# Patient Record
Sex: FEMALE | Race: BLACK OR AFRICAN AMERICAN | Employment: OTHER | URBAN - METROPOLITAN AREA
[De-identification: names, ages, dates, MRNs, and addresses within clinical notes are randomized per-mention and may not be internally consistent; named-entity substitution may affect disease eponyms.]

---

## 2017-04-17 RX ORDER — VERAPAMIL HYDROCHLORIDE 240 MG/1
TABLET, FILM COATED, EXTENDED RELEASE ORAL
Qty: 90 TAB | Refills: 2 | Status: SHIPPED | OUTPATIENT
Start: 2017-04-17 | End: 2017-11-29 | Stop reason: SDUPTHER

## 2017-04-17 RX ORDER — PRAVASTATIN SODIUM 40 MG/1
TABLET ORAL
Qty: 90 TAB | Refills: 2 | Status: SHIPPED | OUTPATIENT
Start: 2017-04-17 | End: 2017-11-29 | Stop reason: SDUPTHER

## 2017-06-08 ENCOUNTER — OFFICE VISIT (OUTPATIENT)
Dept: INTERNAL MEDICINE CLINIC | Age: 82
End: 2017-06-08

## 2017-06-08 VITALS
OXYGEN SATURATION: 97 % | BODY MASS INDEX: 32.47 KG/M2 | DIASTOLIC BLOOD PRESSURE: 80 MMHG | HEIGHT: 66 IN | HEART RATE: 85 BPM | WEIGHT: 202 LBS | SYSTOLIC BLOOD PRESSURE: 164 MMHG | TEMPERATURE: 98 F | RESPIRATION RATE: 16 BRPM

## 2017-06-08 DIAGNOSIS — E11.9 CONTROLLED TYPE 2 DIABETES MELLITUS WITHOUT COMPLICATION, WITHOUT LONG-TERM CURRENT USE OF INSULIN (HCC): Chronic | ICD-10-CM

## 2017-06-08 DIAGNOSIS — I10 ESSENTIAL HYPERTENSION, BENIGN: Chronic | ICD-10-CM

## 2017-06-08 DIAGNOSIS — R39.81 FUNCTIONAL URINARY INCONTINENCE: ICD-10-CM

## 2017-06-08 DIAGNOSIS — F03.90 DEMENTIA WITHOUT BEHAVIORAL DISTURBANCE, UNSPECIFIED DEMENTIA TYPE: Primary | ICD-10-CM

## 2017-06-08 DIAGNOSIS — E78.2 MIXED HYPERLIPIDEMIA: Chronic | ICD-10-CM

## 2017-06-08 LAB
BILIRUB UR QL STRIP: NEGATIVE
GLUCOSE UR-MCNC: NEGATIVE MG/DL
KETONES P FAST UR STRIP-MCNC: NEGATIVE MG/DL
PH UR STRIP: 6 [PH] (ref 4.6–8)
PROT UR QL STRIP: NEGATIVE MG/DL
SP GR UR STRIP: 1.02 (ref 1–1.03)
UA UROBILINOGEN AMB POC: NORMAL (ref 0.2–1)
URINALYSIS CLARITY POC: CLEAR
URINALYSIS COLOR POC: NORMAL
URINE BLOOD POC: NORMAL
URINE LEUKOCYTES POC: NEGATIVE
URINE NITRITES POC: NEGATIVE

## 2017-06-08 RX ORDER — OXYBUTYNIN CHLORIDE 5 MG/1
TABLET, EXTENDED RELEASE ORAL
Qty: 30 TAB | Refills: 5 | Status: SHIPPED | OUTPATIENT
Start: 2017-06-08 | End: 2017-09-08

## 2017-06-08 RX ORDER — MEMANTINE HYDROCHLORIDE 28 MG/1
28 CAPSULE, EXTENDED RELEASE ORAL DAILY
Qty: 30 CAP | Refills: 5 | Status: SHIPPED | OUTPATIENT
Start: 2017-06-08 | End: 2017-09-08 | Stop reason: SINTOL

## 2017-06-08 NOTE — PATIENT INSTRUCTIONS
Low Sodium Diet (2,000 Milligram): Care Instructions  Your Care Instructions  Too much sodium causes your body to hold on to extra water. This can raise your blood pressure and force your heart and kidneys to work harder. In very serious cases, this could cause you to be put in the hospital. It might even be life-threatening. By limiting sodium, you will feel better and lower your risk of serious problems. The most common source of sodium is salt. People get most of the salt in their diet from canned, prepared, and packaged foods. Fast food and restaurant meals also are very high in sodium. Your doctor will probably limit your sodium to less than 2,000 milligrams (mg) a day. This limit counts all the sodium in prepared and packaged foods and any salt you add to your food. Follow-up care is a key part of your treatment and safety. Be sure to make and go to all appointments, and call your doctor if you are having problems. It's also a good idea to know your test results and keep a list of the medicines you take. How can you care for yourself at home? Read food labels  · Read labels on cans and food packages. The labels tell you how much sodium is in each serving. Make sure that you look at the serving size. If you eat more than the serving size, you have eaten more sodium. · Food labels also tell you the Percent Daily Value for sodium. Choose products with low Percent Daily Values for sodium. · Be aware that sodium can come in forms other than salt, including monosodium glutamate (MSG), sodium citrate, and sodium bicarbonate (baking soda). MSG is often added to Asian food. When you eat out, you can sometimes ask for food without MSG or added salt. Buy low-sodium foods  · Buy foods that are labeled \"unsalted\" (no salt added), \"sodium-free\" (less than 5 mg of sodium per serving), or \"low-sodium\" (less than 140 mg of sodium per serving).  Foods labeled \"reduced-sodium\" and \"light sodium\" may still have too much sodium. Be sure to read the label to see how much sodium you are getting. · Buy fresh vegetables, or frozen vegetables without added sauces. Buy low-sodium versions of canned vegetables, soups, and other canned goods. Prepare low-sodium meals  · Cut back on the amount of salt you use in cooking. This will help you adjust to the taste. Do not add salt after cooking. One teaspoon of salt has about 2,300 mg of sodium. · Take the salt shaker off the table. · Flavor your food with garlic, lemon juice, onion, vinegar, herbs, and spices. Do not use soy sauce, lite soy sauce, steak sauce, onion salt, garlic salt, celery salt, mustard, or ketchup on your food. · Use low-sodium salad dressings, sauces, and ketchup. Or make your own salad dressings and sauces without adding salt. · Use less salt (or none) when recipes call for it. You can often use half the salt a recipe calls for without losing flavor. Other foods such as rice, pasta, and grains do not need added salt. · Rinse canned vegetables, and cook them in fresh water. This removes some--but not all--of the salt. · Avoid water that is naturally high in sodium or that has been treated with water softeners, which add sodium. Call your local water company to find out the sodium content of your water supply. If you buy bottled water, read the label and choose a sodium-free brand. Avoid high-sodium foods  · Avoid eating:  ¨ Smoked, cured, salted, and canned meat, fish, and poultry. ¨ Ham, escamilla, hot dogs, and luncheon meats. ¨ Regular, hard, and processed cheese and regular peanut butter. ¨ Crackers with salted tops, and other salted snack foods such as pretzels, chips, and salted popcorn. ¨ Frozen prepared meals, unless labeled low-sodium. ¨ Canned and dried soups, broths, and bouillon, unless labeled sodium-free or low-sodium. ¨ Canned vegetables, unless labeled sodium-free or low-sodium. ¨ Western Ingrid fries, pizza, tacos, and other fast foods.   Vernel Real, olives, ketchup, and other condiments, especially soy sauce, unless labeled sodium-free or low-sodium. Where can you learn more? Go to http://annelise-mirna.info/. Enter A239 in the search box to learn more about \"Low Sodium Diet (2,000 Milligram): Care Instructions. \"  Current as of: July 26, 2016  Content Version: 11.2  © 2160-1986 [employer]. Care instructions adapted under license by [employer] (which disclaims liability or warranty for this information). If you have questions about a medical condition or this instruction, always ask your healthcare professional. [place] [address] any warranty or liability for your use of this information. Leg and Ankle Edema: Care Instructions  Your Care Instructions  Swelling in the legs, ankles, and feet is called edema. It is common after you sit or stand for a while. Long plane flights or car rides often cause swelling in the legs and feet. You may also have swelling if you have to stand for long periods of time at your job. Problems with the veins in the legs (varicose veins) and changes in hormones can also cause swelling. Sometimes the swelling in the ankles and feet is caused by a more serious problem, such as heart failure, infection, blood clots, or liver or kidney disease. Follow-up care is a key part of your treatment and safety. Be sure to make and go to all appointments, and call your doctor if you are having problems. Its also a good idea to know your test results and keep a list of the medicines you take. How can you care for yourself at home? · If your doctor gave you medicine, take it as prescribed. Call your doctor if you think you are having a problem with your medicine. · Whenever you are resting, raise your legs up. Try to keep the swollen area higher than the level of your heart. · Take breaks from standing or sitting in one position.   ¨ Walk around to increase the blood flow in your lower legs. ¨ Move your feet and ankles often while you stand, or tighten and relax your leg muscles. · Wear support stockings. Put them on in the morning, before swelling gets worse. · Eat a balanced diet. Lose weight if you need to. · Limit the amount of salt (sodium) in your diet. Salt holds fluid in the body and may increase swelling. When should you call for help? Call 911 anytime you think you may need emergency care. For example, call if:  · You have symptoms of a blood clot in your lung (called a pulmonary embolism). These may include:  ¨ Sudden chest pain. ¨ Trouble breathing. ¨ Coughing up blood. Call your doctor now or seek immediate medical care if:  · You have signs of a blood clot, such as:  ¨ Pain in your calf, back of the knee, thigh, or groin. ¨ Redness and swelling in your leg or groin. · You have symptoms of infection, such as:  ¨ Increased pain, swelling, warmth, or redness. ¨ Red streaks or pus. ¨ A fever. Watch closely for changes in your health, and be sure to contact your doctor if:  · Your swelling is getting worse. · You have new or worsening pain in your legs. · You do not get better as expected. Where can you learn more? Go to http://annelise-mirna.info/. Enter I228 in the search box to learn more about \"Leg and Ankle Edema: Care Instructions. \"  Current as of: May 27, 2016  Content Version: 11.2  © 3819-2067 [employer]. Care instructions adapted under license by [employer] (which disclaims liability or warranty for this information). If you have questions about a medical condition or this instruction, always ask your healthcare professional. [place] [address] any warranty or liability for your use of this information.

## 2017-06-08 NOTE — MR AVS SNAPSHOT
Visit Information Date & Time Provider Department Dept. Phone Encounter #  
 6/8/2017  9:15 AM Trenton Gilman, [address] [id number] Follow-up Instructions Return in about 3 months (around 9/8/2017) for follow up on medications. Gloria Cassidy Upcoming Health Maintenance Date Due DTaP/Tdap/Td series (1 - Tdap) 1/16/1953 ZOSTER VACCINE AGE 60> 1/16/1992 Pneumococcal 65+ Low/Medium Risk (2 of 2 - PCV13) 7/8/2014 FOOT EXAM Q1 6/10/2016 EYE EXAM RETINAL OR DILATED Q1 6/10/2016 MICROALBUMIN Q1 11/11/2016 HEMOGLOBIN A1C Q6M 6/8/2017 OSTEOPOROSIS SCREENING (DEXA) 6/13/2020* INFLUENZA AGE 9 TO ADULT 8/1/2017 LIPID PANEL Q1 9/8/2017 MEDICARE YEARLY EXAM 12/9/2017 *Topic was postponed. The date shown is not the original due date. Allergies as of 6/8/2017  Review Complete On: 6/8/2017 By: Trenton Gilman MD  
  
 Severity Noted Reaction Type Reactions Ace Inhibitors  06/11/2010   Side Effect Cough Codeine  03/02/2010   Intolerance Nausea and Vomiting Current Immunizations  Reviewed on 11/19/2014 Name Date Influenza High Dose Vaccine PF 12/8/2016 Influenza Vaccine 11/19/2014, 11/18/2013 Influenza Vaccine (Quad) PF 11/11/2015 Influenza Vaccine Split 11/19/2012  2:48 PM, 9/27/2011, 10/27/2010 Influenza Vaccine Whole 10/5/2009 Pneumococcal Polysaccharide (PPSV-23) 7/8/2013 Not reviewed this visit You Were Diagnosed With   
  
 Codes Comments Dementia without behavioral disturbance, unspecified dementia type    -  Primary ICD-10-CM: F03.90 ICD-9-CM: 294.20 Functional urinary incontinence     ICD-10-CM: R39.81 ICD-9-CM: 788.91 Essential hypertension, benign     ICD-10-CM: I10 
ICD-9-CM: 401.1 Controlled type 2 diabetes mellitus without complication, without long-term current use of insulin ([facility]     ICD-10-CM: E11.9 ICD-9-CM: 250.00 Mixed hyperlipidemia     ICD-10-CM: E78.2 ICD-9-CM: 272.2 Vitals BP Pulse Temp Resp Height(growth percentile) Weight(growth percentile) 164/80 (BP 1 Location: Left arm, BP Patient Position: Sitting) 85 98 °F (36.7 °C) (Oral) 16 5' 6\" (1.676 m) 202 lb (91.6 kg) SpO2 BMI OB Status Smoking Status 97% 32.6 kg/m2 Hysterectomy Never Smoker Vitals History BMI and BSA Data Body Mass Index Body Surface Area  
 32.6 kg/m 2 2.07 m 2 Preferred Pharmacy Pharmacy Name Phone [id number] [phone] Your Updated Medication List  
  
   
This list is accurate as of: 6/8/17 10:42 AM.  Always use your most recent med list.  
  
  
  
  
 aspirin delayed-release 81 mg tablet Take 81 mg by mouth daily. Indications: MYOCARDIAL INFARCTION PREVENTION  
  
 * memantine 7-14-21-28 mg C24k Commonly known as:  NAMENDA XR Take 1 Tab by mouth daily. Take as per titration pack. * memantine ER 28 mg capsule Commonly known as:  NAMENDA XR Take 1 Cap by mouth daily. Starting month #2  
  
 metFORMIN 1,000 mg tablet Commonly known as:  GLUCOPHAGE  
take 1 tablet by mouth twice a day with meals  
  
 oxybutynin chloride XL 5 mg CR tablet Commonly known as:  DITROPAN XL Take one tablet by mouth once a day  
  
 pravastatin 40 mg tablet Commonly known as:  PRAVACHOL  
take 1 tablet by mouth NIGHTLY  
  
 TYLENOL 325 mg tablet Generic drug:  acetaminophen Take 325-650 mg by mouth every six (6) hours as needed for Pain. Indications: HEADACHE DISORDER  
  
 verapamil  mg CR tablet Commonly known as:  CALAN-SR  
take 1 tablet by mouth once daily * Notice: This list has 2 medication(s) that are the same as other medications prescribed for you. Read the directions carefully, and ask your doctor or other care provider to review them with you. Prescriptions Printed Refills memantine ER (NAMENDA XR) 28 mg capsule 5 Sig: Take 1 Cap by mouth daily. Starting month #2 Class: Print Route: Oral  
  
Prescriptions Sent to Pharmacy Refills  
 memantine (NAMENDA XR) 7-14-21-28 mg C24k 0 Sig: Take 1 Tab by mouth daily. Take as per titration pack. Class: Normal  
 Pharmacy: [id number] [address] [place] [address] Ph #: [phone] Route: Oral  
 oxybutynin chloride XL (DITROPAN XL) 5 mg CR tablet 5 Sig: Take one tablet by mouth once a day Class: Normal  
 Pharmacy: [id number] [address] [place] [address] Ph #: [phone] We Performed the Following AMB POC URINALYSIS DIP STICK AUTO W/O MICRO [56972 CPT(R)] Follow-up Instructions Return in about 3 months (around 9/8/2017) for follow up on medications. .  
  
  
Patient Instructions Low Sodium Diet (2,000 Milligram): Care Instructions Your Care Instructions Too much sodium causes your body to hold on to extra water. This can raise your blood pressure and force your heart and kidneys to work harder. In very serious cases, this could cause you to be put in the hospital. It might even be life-threatening. By limiting sodium, you will feel better and lower your risk of serious problems. The most common source of sodium is salt. People get most of the salt in their diet from canned, prepared, and packaged foods. Fast food and restaurant meals also are very high in sodium. Your doctor will probably limit your sodium to less than 2,000 milligrams (mg) a day. This limit counts all the sodium in prepared and packaged foods and any salt you add to your food. Follow-up care is a key part of your treatment and safety. Be sure to make and go to all appointments, and call your doctor if you are having problems. It's also a good idea to know your test results and keep a list of the medicines you take. How can you care for yourself at home? Read food labels · Read labels on cans and food packages. The labels tell you how much sodium is in each serving. Make sure that you look at the serving size. If you eat more than the serving size, you have eaten more sodium. · Food labels also tell you the Percent Daily Value for sodium. Choose products with low Percent Daily Values for sodium. · Be aware that sodium can come in forms other than salt, including monosodium glutamate (MSG), sodium citrate, and sodium bicarbonate (baking soda). MSG is often added to Asian food. When you eat out, you can sometimes ask for food without MSG or added salt. Buy low-sodium foods · Buy foods that are labeled \"unsalted\" (no salt added), \"sodium-free\" (less than 5 mg of sodium per serving), or \"low-sodium\" (less than 140 mg of sodium per serving). Foods labeled \"reduced-sodium\" and \"light sodium\" may still have too much sodium. Be sure to read the label to see how much sodium you are getting. · Buy fresh vegetables, or frozen vegetables without added sauces. Buy low-sodium versions of canned vegetables, soups, and other canned goods. Prepare low-sodium meals · Cut back on the amount of salt you use in cooking. This will help you adjust to the taste. Do not add salt after cooking. One teaspoon of salt has about 2,300 mg of sodium. · Take the salt shaker off the table. · Flavor your food with garlic, lemon juice, onion, vinegar, herbs, and spices. Do not use soy sauce, lite soy sauce, steak sauce, onion salt, garlic salt, celery salt, mustard, or ketchup on your food. · Use low-sodium salad dressings, sauces, and ketchup. Or make your own salad dressings and sauces without adding salt. · Use less salt (or none) when recipes call for it. You can often use half the salt a recipe calls for without losing flavor. Other foods such as rice, pasta, and grains do not need added salt. · Rinse canned vegetables, and cook them in fresh water. This removes somebut not allof the salt. · Avoid water that is naturally high in sodium or that has been treated with water softeners, which add sodium. Call your local water company to find out the sodium content of your water supply. If you buy bottled water, read the label and choose a sodium-free brand. Avoid high-sodium foods · Avoid eating: ¨ Smoked, cured, salted, and canned meat, fish, and poultry. ¨ Ham, escamilla, hot dogs, and luncheon meats. ¨ Regular, hard, and processed cheese and regular peanut butter. ¨ Crackers with salted tops, and other salted snack foods such as pretzels, chips, and salted popcorn. ¨ Frozen prepared meals, unless labeled low-sodium. ¨ Canned and dried soups, broths, and bouillon, unless labeled sodium-free or low-sodium. ¨ Canned vegetables, unless labeled sodium-free or low-sodium. ¨ Western Ingrid fries, pizza, tacos, and other fast foods. ¨ Pickles, olives, ketchup, and other condiments, especially soy sauce, unless labeled sodium-free or low-sodium. Where can you learn more? Go to http://annelise-mirna.info/. Enter G276 in the search box to learn more about \"Low Sodium Diet (2,000 Milligram): Care Instructions. \" Current as of: July 26, 2016 Content Version: 11.2 © 1113-9584 [employer]. Care instructions adapted under license by [employer] (which disclaims liability or warranty for this information). If you have questions about a medical condition or this instruction, always ask your healthcare professional. [place] [address] any warranty or liability for your use of this information. Leg and Ankle Edema: Care Instructions Your Care Instructions Swelling in the legs, ankles, and feet is called edema. It is common after you sit or stand for a while.  Long plane flights or car rides often cause swelling in the legs and feet. You may also have swelling if you have to stand for long periods of time at your job. Problems with the veins in the legs (varicose veins) and changes in hormones can also cause swelling. Sometimes the swelling in the ankles and feet is caused by a more serious problem, such as heart failure, infection, blood clots, or liver or kidney disease. Follow-up care is a key part of your treatment and safety. Be sure to make and go to all appointments, and call your doctor if you are having problems. Its also a good idea to know your test results and keep a list of the medicines you take. How can you care for yourself at home? · If your doctor gave you medicine, take it as prescribed. Call your doctor if you think you are having a problem with your medicine. · Whenever you are resting, raise your legs up. Try to keep the swollen area higher than the level of your heart. · Take breaks from standing or sitting in one position. ¨ Walk around to increase the blood flow in your lower legs. ¨ Move your feet and ankles often while you stand, or tighten and relax your leg muscles. · Wear support stockings. Put them on in the morning, before swelling gets worse. · Eat a balanced diet. Lose weight if you need to. · Limit the amount of salt (sodium) in your diet. Salt holds fluid in the body and may increase swelling. When should you call for help? Call 911 anytime you think you may need emergency care. For example, call if: 
· You have symptoms of a blood clot in your lung (called a pulmonary embolism). These may include: 
¨ Sudden chest pain. ¨ Trouble breathing. ¨ Coughing up blood. Call your doctor now or seek immediate medical care if: 
· You have signs of a blood clot, such as: 
¨ Pain in your calf, back of the knee, thigh, or groin. ¨ Redness and swelling in your leg or groin. · You have symptoms of infection, such as: 
¨ Increased pain, swelling, warmth, or redness. ¨ Red streaks or pus. ¨ A fever. Watch closely for changes in your health, and be sure to contact your doctor if: 
· Your swelling is getting worse. · You have new or worsening pain in your legs. · You do not get better as expected. Where can you learn more? Go to http://annelise-mirna.info/. Enter H199 in the search box to learn more about \"Leg and Ankle Edema: Care Instructions. \" Current as of: May 27, 2016 Content Version: 11.2 © 0684-9203 [employer]. Care instructions adapted under license by [employer] (which disclaims liability or warranty for this information). If you have questions about a medical condition or this instruction, always ask your healthcare professional. [address] any warranty or liability for your use of this information. Introducing [facility]! Elvia Wray introduces Clickslide patient portal. Now you can access parts of your medical record, email your doctor's office, and request medication refills online. 1. In your internet browser, go to [email or web address] 2. Click on the First Time User? Click Here link in the Sign In box. You will see the New Member Sign Up page. 3. Enter your Clickslide Access Code exactly as it appears below. You will not need to use this code after youve completed the sign-up process. If you do not sign up before the expiration date, you must request a new code. · Clickslide Access Code: [email or web address] Expires: 9/6/2017 10:42 AM 
 
4. Enter the last four digits of your Social Security Number (xxxx) and Date of Birth (mm/dd/yyyy) as indicated and click Submit. You will be taken to the next sign-up page. 5. Create a NGRAINt ID. This will be your Clickslide login ID and cannot be changed, so think of one that is secure and easy to remember. 6. Create a NGRAINt password. You can change your password at any time. 7. Enter your Password Reset Question and Answer. This can be used at a later time if you forget your password. 8. Enter your e-mail address. You will receive e-mail notification when new information is available in [address] 9. Click Sign Up. You can now view and download portions of your medical record. 10. Click the Download Summary menu link to download a portable copy of your medical information. If you have questions, please visit the Frequently Asked Questions section of the [employer] website. Remember, [employer] is NOT to be used for urgent needs. For medical emergencies, dial 911. Now available from your iPhone and Android! Please provide this summary of care documentation to your next provider. Your primary care clinician is listed as Kathy Joe. If you have any questions after today's visit, please call [phone].

## 2017-06-08 NOTE — PROGRESS NOTES
Chief Complaint   Patient presents with    Leg Swelling     left, \"every evening\"    Anxiety     evening \"feeling that I'm sort of anxious\"

## 2017-06-08 NOTE — PROGRESS NOTES
Honey Pena is a 80 y.o. female who presents with concerns of left leg swelling, onset last week. In with daughter. She reports that the left leg swelled last week, better today. She does not restrict salt in diet- escamilla, chips. Does not elevate legs. No leg pain. No symptoms in right leg. Daughter was concerned about appetite. Weight was 205#, today 203#. No nausea. No abdominal pain. Normal daily BM. Normal urination. Sleeping well at night. Napping during the day per daughter. Daughter reports that she is afraid when she is alone in the house, especially in the evening. She is not left alone in the house, but if she is in the other room, it can cause an issue. She has urinated on the floor at night. The bathroom is close. No mobility problems, no assist device used, no falls. The patient does not recall the episodes. During the day she may waits too long to urinate. Tried depends and poise pads, but will not wear them. Denies depression. Has been diagnosed with dementia. She refused to take a memory medication. Per daughter, she has very poor short term recall. Past Medical History:   Diagnosis Date    DM Renal Manif Type II 2/11/2010    GERD (gastroesophageal reflux disease) 2/11/2010    Glaucoma, open angle 10/29/2010    Headache     HTN (hypertension) 2/11/2010    Hypercholesterolemia     Mixed hyperlipidemia 2/11/2010    Type II or unspecified type diabetes mellitus with renal manifestations, not stated as uncontrolled 2/11/2010       Family History   Problem Relation Age of Onset    Cancer Mother      lung    Hypertension Mother     Cancer Brother      stomach    Hypertension Brother     Other Father      accidental death when pt was a little girl    Diabetes Brother        Social History     Social History    Marital status:      Spouse name: N/A    Number of children: N/A    Years of education: N/A     Occupational History    Not on file. Social History Main Topics    Smoking status: Never Smoker    Smokeless tobacco: Never Used    Alcohol use No    Drug use: No    Sexual activity: Not on file     Other Topics Concern    Not on file     Social History Narrative       Current Outpatient Prescriptions on File Prior to Visit   Medication Sig Dispense Refill    pravastatin (PRAVACHOL) 40 mg tablet take 1 tablet by mouth NIGHTLY 90 Tab 2    verapamil ER (CALAN-SR) 240 mg CR tablet take 1 tablet by mouth once daily 90 Tab 2    aspirin delayed-release 81 mg tablet Take 81 mg by mouth daily. Indications: MYOCARDIAL INFARCTION PREVENTION      acetaminophen (TYLENOL) 325 mg tablet Take 325-650 mg by mouth every six (6) hours as needed for Pain. Indications: HEADACHE DISORDER       No current facility-administered medications on file prior to visit. Review of Systems  Pertinent items are noted in HPI. Objective:     Visit Vitals    /80 (BP 1 Location: Left arm, BP Patient Position: Sitting)    Pulse 85    Temp 98 °F (36.7 °C) (Oral)    Resp 16    Ht 5' 6\" (1.676 m)    Wt 202 lb (91.6 kg)    SpO2 97%    BMI 32.6 kg/m2     Gen: well appearing female  HEENT:   PERRL,normal conjunctiva. External ear and canals normal, TMs no opacification or erythema,  OP no erythema, no exudates, MMM  Neck:  Supple. Thyroid normal size, nontender, without nodules. No masses or LAD  Resp:  No wheezing, no rhonchi, no rales. CV:  RRR, normal S1S2, no murmur. GI: soft, nontender, without masses. No hepatosplenomegaly. Extrem:  +2 pulses, +1 bilateral edema, warm distally  Neuro: alert, cooperative,  Cognitive deficits. Assessment/Plan:       ICD-10-CM ICD-9-CM    1. Dementia without behavioral disturbance, unspecified dementia type F03.90 294.20 memantine (NAMENDA XR) 7-14-21-28 mg C24k      memantine ER (NAMENDA XR) 28 mg capsule   2.  Functional urinary incontinence R39.81 788.91 oxybutynin chloride XL (DITROPAN XL) 5 mg CR tablet AMB POC URINALYSIS DIP STICK AUTO W/O MICRO   3. Essential hypertension, benign I10 401.1    4. Controlled type 2 diabetes mellitus without complication, without long-term current use of insulin (HCC) E11.9 250.00    5. Mixed hyperlipidemia E78.2 272.2        Follow-up Disposition:  Return in about 3 months (around 9/8/2017) for follow up on medications.   Woody Elliott MD

## 2017-06-11 RX ORDER — METFORMIN HYDROCHLORIDE 1000 MG/1
TABLET ORAL
Qty: 180 TAB | Refills: 1 | Status: SHIPPED | OUTPATIENT
Start: 2017-06-11 | End: 2017-11-29 | Stop reason: SDUPTHER

## 2017-06-27 ENCOUNTER — TELEPHONE (OUTPATIENT)
Dept: INTERNAL MEDICINE CLINIC | Age: 82
End: 2017-06-27

## 2017-06-27 NOTE — TELEPHONE ENCOUNTER
Pt's daughter, Leora Montgomery is asking for an appt with Dr. Zunilda Roth. She would prefer for her to see mom. Pt is having leg swelling and the dementia medication doesn't seem to be working.  Please call

## 2017-06-28 NOTE — TELEPHONE ENCOUNTER
Spoke with [place]. Patient is having swelling in legs bilaterally. Denies pain. Also states they have not seen any improvement since starting memantine on 6/8/17. She requested an appointment for patient. Scheduled on 6/29/17 at 1000.

## 2017-06-29 ENCOUNTER — HOSPITAL ENCOUNTER (OUTPATIENT)
Dept: LAB | Age: 82
Discharge: HOME | End: 2017-06-29
Payer: MEDICARE

## 2017-06-29 ENCOUNTER — OFFICE VISIT (OUTPATIENT)
Dept: INTERNAL MEDICINE CLINIC | Age: 82
End: 2017-06-29

## 2017-06-29 VITALS
DIASTOLIC BLOOD PRESSURE: 78 MMHG | HEIGHT: 66 IN | TEMPERATURE: 97.9 F | WEIGHT: 204 LBS | BODY MASS INDEX: 32.78 KG/M2 | OXYGEN SATURATION: 98 % | SYSTOLIC BLOOD PRESSURE: 188 MMHG | RESPIRATION RATE: 16 BRPM | HEART RATE: 85 BPM

## 2017-06-29 DIAGNOSIS — I10 ESSENTIAL HYPERTENSION, BENIGN: Chronic | ICD-10-CM

## 2017-06-29 DIAGNOSIS — R60.9 EDEMA, UNSPECIFIED TYPE: Primary | ICD-10-CM

## 2017-06-29 PROCEDURE — 36415 COLL VENOUS BLD VENIPUNCTURE: CPT

## 2017-06-29 PROCEDURE — 80048 BASIC METABOLIC PNL TOTAL CA: CPT

## 2017-06-29 PROCEDURE — 84443 ASSAY THYROID STIM HORMONE: CPT

## 2017-06-29 RX ORDER — TRIAMTERENE AND HYDROCHLOROTHIAZIDE 37.5; 25 MG/1; MG/1
1 CAPSULE ORAL DAILY
Qty: 30 CAP | Refills: 2 | Status: SHIPPED | OUTPATIENT
Start: 2017-06-29 | End: 2017-11-27 | Stop reason: SDUPTHER

## 2017-06-29 NOTE — PATIENT INSTRUCTIONS
ELEVATE LEGS DURING THE DAY WHEN SEATED. AVOID SODIUM IN DIET. TAKE THE FLUID PILL (TRIAMTERENE/HCTZ) DAILY FOR 5 DAYS, THEN REDUCE TO AS NEEDED ONLY 3 DAYS A WEEK.

## 2017-06-29 NOTE — MR AVS SNAPSHOT
Visit Information Date & Time Provider Department Dept. Phone Encounter #  
 6/29/2017 10:00 AM Jase Lowery, [address] [facility] [phone] Follow-up Instructions Return for [facility] . Your Appointments 9/8/2017  9:15 AM  
ROUTINE CARE with Jase Lowery MD  
[facility] [address]) Appt Note: 3 month f/u  
 [address] [id number]  
[address] [facility] [address] [place] [address] Upcoming Health Maintenance Date Due DTaP/Tdap/Td series (1 - Tdap) 1/16/1953 ZOSTER VACCINE AGE 60> 1/16/1992 Pneumococcal 65+ Low/Medium Risk (2 of 2 - PCV13) 7/8/2014 FOOT EXAM Q1 6/10/2016 EYE EXAM RETINAL OR DILATED Q1 6/10/2016 MICROALBUMIN Q1 11/11/2016 HEMOGLOBIN A1C Q6M 6/8/2017 OSTEOPOROSIS SCREENING (DEXA) 6/13/2020* INFLUENZA AGE 9 TO ADULT 8/1/2017 LIPID PANEL Q1 9/8/2017 MEDICARE YEARLY EXAM 12/9/2017 *Topic was postponed. The date shown is not the original due date. Allergies as of 6/29/2017  Review Complete On: 6/29/2017 By: Jase Lowery MD  
  
 Severity Noted Reaction Type Reactions Ace Inhibitors  06/11/2010   Side Effect Cough Codeine  03/02/2010   Intolerance Nausea and Vomiting Current Immunizations  Reviewed on 11/19/2014 Name Date Influenza High Dose Vaccine PF 12/8/2016 Influenza Vaccine 11/19/2014, 11/18/2013 Influenza Vaccine (Quad) PF 11/11/2015 Influenza Vaccine Split 11/19/2012  2:48 PM, 9/27/2011, 10/27/2010 Influenza Vaccine Whole 10/5/2009 Pneumococcal Polysaccharide (PPSV-23) 7/8/2013 Not reviewed this visit You Were Diagnosed With   
  
 Codes Comments Edema, unspecified type    -  Primary ICD-10-CM: R60.9 ICD-9-CM: 782.3 Essential hypertension, benign     ICD-10-CM: I10 
ICD-9-CM: 401.1 Vitals BP Pulse Temp Resp Height(growth percentile) Weight(growth percentile) 188/78 (BP 1 Location: Left arm, BP Patient Position: Sitting) 85 97.9 °F (36.6 °C) (Oral) 16 5' 6\" (1.676 m) 204 lb (92.5 kg) SpO2 BMI OB Status Smoking Status 98% 32.93 kg/m2 Hysterectomy Never Smoker BMI and BSA Data Body Mass Index Body Surface Area  
 32.93 kg/m 2 2.08 m 2 Preferred Pharmacy Pharmacy Name Phone [id number] [phone] Your Updated Medication List  
  
   
This list is accurate as of: 6/29/17 10:47 AM.  Always use your most recent med list.  
  
  
  
  
 aspirin delayed-release 81 mg tablet Take 81 mg by mouth daily. Indications: MYOCARDIAL INFARCTION PREVENTION  
  
 * memantine 7-14-21-28 mg C24k Commonly known as:  NAMENDA XR Take 1 Tab by mouth daily. Take as per titration pack. * memantine ER 28 mg capsule Commonly known as:  NAMENDA XR Take 1 Cap by mouth daily. Starting month #2  
  
 metFORMIN 1,000 mg tablet Commonly known as:  GLUCOPHAGE  
take 1 tablet by mouth twice a day with meals  
  
 oxybutynin chloride XL 5 mg CR tablet Commonly known as:  DITROPAN XL Take one tablet by mouth once a day  
  
 pravastatin 40 mg tablet Commonly known as:  PRAVACHOL  
take 1 tablet by mouth NIGHTLY  
  
 triamterene-hydroCHLOROthiazide 37.5-25 mg per capsule Commonly known as:  Allan Nyhan Take 1 Cap by mouth daily. As needed for swelling. TYLENOL 325 mg tablet Generic drug:  acetaminophen Take 325-650 mg by mouth every six (6) hours as needed for Pain. Indications: HEADACHE DISORDER  
  
 verapamil  mg CR tablet Commonly known as:  CALAN-SR  
take 1 tablet by mouth once daily * Notice: This list has 2 medication(s) that are the same as other medications prescribed for you.  Read the directions carefully, and ask your doctor or other care provider to review them with you. Prescriptions Sent to Pharmacy Refills  
 triamterene-hydroCHLOROthiazide (DYAZIDE) 37.5-25 mg per capsule 2 Sig: Take 1 Cap by mouth daily. As needed for swelling. Class: Normal  
 Pharmacy: [id number] #: [phone] Route: Oral  
  
We Performed the Following METABOLIC PANEL, BASIC [23697 CPT(R)] TSH 3RD GENERATION [58643 CPT(R)] Follow-up Instructions Return for [facility] . Patient Instructions ELEVATE LEGS DURING THE DAY WHEN SEATED. AVOID SODIUM IN DIET. TAKE THE FLUID PILL (TRIAMTERENE/HCTZ) DAILY FOR 5 DAYS, THEN REDUCE TO AS NEEDED ONLY 3 DAYS A WEEK. Introducing [facility]! Saul Blair introduces [employer] patient portal. Now you can access parts of your medical record, email your doctor's office, and request medication refills online. 1. In your internet browser, go to [email or web address] 2. Click on the First Time User? Click Here link in the Sign In box. You will see the New Member Sign Up page. 3. Enter your [employer] Access Code exactly as it appears below. You will not need to use this code after youve completed the sign-up process. If you do not sign up before the expiration date, you must request a new code. · [employer] Access Code: [email or web address] Expires: 9/6/2017 10:42 AM 
 
4. Enter the last four digits of your Social Security Number (xxxx) and Date of Birth (mm/dd/yyyy) as indicated and click Submit. You will be taken to the next sign-up page. 5. Create a [employer] ID. This will be your [employer] login ID and cannot be changed, so think of one that is secure and easy to remember. 6. Create a [employer] password. You can change your password at any time. 7. Enter your Password Reset Question and Answer.  This can be used at a later time if you forget your password. 8. Enter your e-mail address. You will receive e-mail notification when new information is available in [address] 9. Click Sign Up. You can now view and download portions of your medical record. 10. Click the Download Summary menu link to download a portable copy of your medical information. If you have questions, please visit the Frequently Asked Questions section of the [employer] website. Remember, [employer] is NOT to be used for urgent needs. For medical emergencies, dial 911. Now available from your iPhone and Android! Please provide this summary of care documentation to your next provider. Your primary care clinician is listed as Haris Ha. If you have any questions after today's visit, please call [phone].

## 2017-06-29 NOTE — PROGRESS NOTES
Rich Stokes is a 80 y.o. female who presents with complaints of left leg swelling for 3 days. Last seen on 6/8. In with family member. She reports that the left leg swelled last week, better today. She does not restrict salt in diet, we discussed reducing salt in diet. She is still eating chips. Does not elevate legs, she is not doing that. No leg pain. No symptoms in right leg. Creatinine 1.2 in September 2016. Not taking NSAIDS     Weight stable 203#. No nausea. No abdominal pain. Normal daily BM. Normal urination. Sleeping well at night. Napping during the day per daughter.       Started on Namenda at last visit on 6/3. Family member asking why they are not seeing improvement in memory yet. Past Medical History:   Diagnosis Date    DM Renal Manif Type II 2/11/2010    GERD (gastroesophageal reflux disease) 2/11/2010    Glaucoma, open angle 10/29/2010    Headache     HTN (hypertension) 2/11/2010    Hypercholesterolemia     Mixed hyperlipidemia 2/11/2010    Type II or unspecified type diabetes mellitus with renal manifestations, not stated as uncontrolled 2/11/2010       Family History   Problem Relation Age of Onset    Cancer Mother      lung    Hypertension Mother     Cancer Brother      stomach    Hypertension Brother     Other Father      accidental death when pt was a little girl    Diabetes Brother        Social History     Social History    Marital status:      Spouse name: N/A    Number of children: N/A    Years of education: N/A     Occupational History    Not on file.      Social History Main Topics    Smoking status: Never Smoker    Smokeless tobacco: Never Used    Alcohol use No    Drug use: No    Sexual activity: Not on file     Other Topics Concern    Not on file     Social History Narrative       Current Outpatient Prescriptions on File Prior to Visit   Medication Sig Dispense Refill    metFORMIN (GLUCOPHAGE) 1,000 mg tablet take 1 tablet by mouth twice a day with meals 180 Tab 1    memantine (NAMENDA XR) 7-14-21-28 mg C24k Take 1 Tab by mouth daily. Take as per titration pack. 1 Package 0    oxybutynin chloride XL (DITROPAN XL) 5 mg CR tablet Take one tablet by mouth once a day 30 Tab 5    memantine ER (NAMENDA XR) 28 mg capsule Take 1 Cap by mouth daily. Starting month #2 30 Cap 5    pravastatin (PRAVACHOL) 40 mg tablet take 1 tablet by mouth NIGHTLY 90 Tab 2    verapamil ER (CALAN-SR) 240 mg CR tablet take 1 tablet by mouth once daily 90 Tab 2    aspirin delayed-release 81 mg tablet Take 81 mg by mouth daily. Indications: MYOCARDIAL INFARCTION PREVENTION      acetaminophen (TYLENOL) 325 mg tablet Take 325-650 mg by mouth every six (6) hours as needed for Pain. Indications: HEADACHE DISORDER       No current facility-administered medications on file prior to visit. Review of Systems  Pertinent items are noted in HPI. Objective:     Visit Vitals    /78 (BP 1 Location: Left arm, BP Patient Position: Sitting)    Pulse 85    Temp 97.9 °F (36.6 °C) (Oral)    Resp 16    Ht 5' 6\" (1.676 m)    Wt 204 lb (92.5 kg)    SpO2 98%    BMI 32.93 kg/m2     Gen: well appearing female, comfortable supine  Resp:  No wheezing, no rhonchi, no rales. CV:  RRR, normal S1S2, no murmur. GI: soft, nontender, without masses. Extrem:  +2 pulses, +1 edema, warm distally      Assessment/Plan:     1. Edema, unspecified type- Recommend low salt diet and elevate legs. Use diuretic as directed. - TSH 3RD GENERATION  - METABOLIC PANEL, BASIC  - triamterene-hydroCHLOROthiazide (DYAZIDE) 37.5-25 mg per capsule; Take 1 Cap by mouth daily. As needed for swelling. Dispense: 30 Cap; Refill: 2    2. Essential hypertension, benign- Recommend following a lower sodium diet and regular cardiovascular exercise. Blood pressure goal is less than 130/85 on average. Advised compliance with blood pressure medication and regular follow up.     - triamterene-hydroCHLOROthiazide (DYAZIDE) 37.5-25 mg per capsule; Take 1 Cap by mouth daily. As needed for swelling. Dispense: 30 Cap; Refill: 2    3. Dementia- continue Namenda. Follow up in September    [facility]:  [facility] .     Sydni May MD

## 2017-06-29 NOTE — PROGRESS NOTES
Chief Complaint   Patient presents with    Leg Swelling     left leg x 3 days    Medication Evaluation     Namenda and Ditropan     Reviewed record In preparation for visit and have obtained necessary documentation    1. Have you been to the ER, urgent care clinic since your last visit? Hospitalized since your last visit? NO    2. Have you seen or consulted any other health care providers outside of the [facility] since your last visit? Include any pap smears or colon screening. NO    Patient does not have advance directive on file and has received paperwork.

## 2017-06-30 LAB
BUN SERPL-MCNC: 7 MG/DL (ref 8–27)
BUN/CREAT SERPL: 7 (ref 12–28)
CALCIUM SERPL-MCNC: 8.2 MG/DL (ref 8.7–10.3)
CHLORIDE SERPL-SCNC: 102 MMOL/L (ref 96–106)
CO2 SERPL-SCNC: 28 MMOL/L (ref 18–29)
CREAT SERPL-MCNC: 1.04 MG/DL (ref 0.57–1)
GLUCOSE SERPL-MCNC: 104 MG/DL (ref 65–99)
POTASSIUM SERPL-SCNC: 3.3 MMOL/L (ref 3.5–5.2)
SODIUM SERPL-SCNC: 145 MMOL/L (ref 134–144)
TSH SERPL DL<=0.005 MIU/L-ACNC: 4.05 UIU/ML (ref 0.45–4.5)

## 2017-06-30 NOTE — PROGRESS NOTES
Notify patient or family. Thyroid is at goal.  Kidney function is mildly impaired but stable. Continue with diuretic as we discussed and let me know if not improving. followup in 3 months.

## 2017-07-03 NOTE — PROGRESS NOTES
Verified two patient identifiers, name and . Yamil Maier provided with lab results.  No questions at this time

## 2017-09-08 ENCOUNTER — OFFICE VISIT (OUTPATIENT)
Dept: INTERNAL MEDICINE CLINIC | Age: 82
End: 2017-09-08

## 2017-09-08 ENCOUNTER — HOSPITAL ENCOUNTER (OUTPATIENT)
Dept: LAB | Age: 82
Discharge: HOME | End: 2017-09-08
Payer: MEDICARE

## 2017-09-08 VITALS
BODY MASS INDEX: 32.47 KG/M2 | HEART RATE: 87 BPM | OXYGEN SATURATION: 99 % | DIASTOLIC BLOOD PRESSURE: 76 MMHG | TEMPERATURE: 98 F | WEIGHT: 202 LBS | RESPIRATION RATE: 16 BRPM | HEIGHT: 66 IN | SYSTOLIC BLOOD PRESSURE: 162 MMHG

## 2017-09-08 DIAGNOSIS — H40.10X0 OPEN-ANGLE GLAUCOMA, UNSPECIFIED GLAUCOMA STAGE, UNSPECIFIED LATERALITY, UNSPECIFIED OPEN-ANGLE GLAUCOMA TYPE: ICD-10-CM

## 2017-09-08 DIAGNOSIS — E11.9 CONTROLLED TYPE 2 DIABETES MELLITUS WITHOUT COMPLICATION, WITHOUT LONG-TERM CURRENT USE OF INSULIN (HCC): Chronic | ICD-10-CM

## 2017-09-08 DIAGNOSIS — F03.90 DEMENTIA WITHOUT BEHAVIORAL DISTURBANCE, UNSPECIFIED DEMENTIA TYPE: ICD-10-CM

## 2017-09-08 DIAGNOSIS — E55.9 VITAMIN D DEFICIENCY: ICD-10-CM

## 2017-09-08 DIAGNOSIS — I10 ESSENTIAL HYPERTENSION, BENIGN: Primary | Chronic | ICD-10-CM

## 2017-09-08 DIAGNOSIS — E78.2 MIXED HYPERLIPIDEMIA: Chronic | ICD-10-CM

## 2017-09-08 PROCEDURE — 80061 LIPID PANEL: CPT

## 2017-09-08 PROCEDURE — 36415 COLL VENOUS BLD VENIPUNCTURE: CPT

## 2017-09-08 PROCEDURE — 83036 HEMOGLOBIN GLYCOSYLATED A1C: CPT

## 2017-09-08 PROCEDURE — 82306 VITAMIN D 25 HYDROXY: CPT

## 2017-09-08 PROCEDURE — 80053 COMPREHEN METABOLIC PANEL: CPT

## 2017-09-08 NOTE — MR AVS SNAPSHOT
Visit Information Date & Time Provider Department Dept. Phone Encounter #  
 9/8/2017  9:15 AM Dariel Hopkins, [address] [place] [address],[address] [phone] [id number] Follow-up Instructions Return in about 3 months (around 12/8/2017) for follow up on medications. Anaya Ram Upcoming Health Maintenance Date Due DTaP/Tdap/Td series (1 - Tdap) 1/16/1953 ZOSTER VACCINE AGE 60> 11/16/1991 Pneumococcal 65+ Low/Medium Risk (2 of 2 - PCV13) 7/8/2014 FOOT EXAM Q1 6/10/2016 EYE EXAM RETINAL OR DILATED Q1 6/10/2016 MICROALBUMIN Q1 11/11/2016 HEMOGLOBIN A1C Q6M 6/8/2017 INFLUENZA AGE 9 TO ADULT 8/1/2017 LIPID PANEL Q1 9/8/2017 OSTEOPOROSIS SCREENING (DEXA) 6/13/2020* MEDICARE YEARLY EXAM 12/9/2017 *Topic was postponed. The date shown is not the original due date. Allergies as of 9/8/2017  Review Complete On: 9/8/2017 By: Dariel Hopkins MD  
  
 Severity Noted Reaction Type Reactions Ace Inhibitors  06/11/2010   Side Effect Cough Codeine  03/02/2010   Intolerance Nausea and Vomiting Current Immunizations  Reviewed on 11/19/2014 Name Date Influenza High Dose Vaccine PF 12/8/2016 Influenza Vaccine 11/19/2014, 11/18/2013 Influenza Vaccine (Quad) PF 11/11/2015 Influenza Vaccine Split 11/19/2012  2:48 PM, 9/27/2011, 10/27/2010 Influenza Vaccine Whole 10/5/2009 Pneumococcal Polysaccharide (PPSV-23) 7/8/2013 Not reviewed this visit You Were Diagnosed With   
  
 Codes Comments Essential hypertension, benign    -  Primary ICD-10-CM: I10 
ICD-9-CM: 401.1 Mixed hyperlipidemia     ICD-10-CM: E78.2 ICD-9-CM: 272.2 Controlled type 2 diabetes mellitus without complication, without long-term current use of insulin ([facility]     ICD-10-CM: E11.9 ICD-9-CM: 250.00 Dementia without behavioral disturbance, unspecified dementia type     ICD-10-CM: F03.90 ICD-9-CM: 294.20 Vitamin D deficiency     ICD-10-CM: E55.9 ICD-9-CM: 268.9 Open-angle glaucoma, unspecified glaucoma stage, unspecified laterality, unspecified open-angle glaucoma type     ICD-10-CM: H40.10X0 ICD-9-CM: 365.10, 365.70 Vitals BP Pulse Temp Resp Height(growth percentile) Weight(growth percentile) 162/76 87 98 °F (36.7 °C) (Oral) 16 5' 6\" (1.676 m) 202 lb (91.6 kg) SpO2 BMI OB Status Smoking Status 99% 32.6 kg/m2 Hysterectomy Never Smoker Vitals History BMI and BSA Data Body Mass Index Body Surface Area  
 32.6 kg/m 2 2.07 m 2 Preferred Pharmacy Pharmacy Name Phone [id number] [phone] Your Updated Medication List  
  
   
This list is accurate as of: 9/8/17  9:32 AM.  Always use your most recent med list.  
  
  
  
  
 aspirin delayed-release 81 mg tablet Take 81 mg by mouth daily. Taking every other day  Indications: myocardial infarction prevention  
  
 metFORMIN 1,000 mg tablet Commonly known as:  GLUCOPHAGE  
take 1 tablet by mouth twice a day with meals  
  
 pravastatin 40 mg tablet Commonly known as:  PRAVACHOL  
take 1 tablet by mouth NIGHTLY  
  
 triamterene-hydroCHLOROthiazide 37.5-25 mg per capsule Commonly known as:  Cheril Mccreedy Take 1 Cap by mouth daily. As needed for swelling. TYLENOL 325 mg tablet Generic drug:  acetaminophen Take 325-650 mg by mouth every six (6) hours as needed for Pain. Indications: HEADACHE DISORDER  
  
 verapamil  mg CR tablet Commonly known as:  CALAN-SR  
take 1 tablet by mouth once daily We Performed the Following HEMOGLOBIN A1C W/O EAG [51452 CPT(R)] LIPID PANEL [14180 CPT(R)] METABOLIC PANEL, COMPREHENSIVE [39265 CPT(R)] VITAMIN D, 25 HYDROXY [id number] CPT(R)] Follow-up Instructions Return in about 3 months (around 12/8/2017) for follow up on medications. .  
  
  
Patient Instructions Take the Triamterene/HCTZ (dyazide) once a day regularly for swelling and blood pressure  and continue the verapamil for blood pressure. Blood pressure goal is less than 150 upper number all of the time. Recommend vitamin D 2,000 iu daily. Introducing [facility]! Abbi Pepper introduces [employer] patient portal. Now you can access parts of your medical record, email your doctor's office, and request medication refills online. 1. In your internet browser, go to [email or web address] 2. Click on the First Time User? Click Here link in the Sign In box. You will see the New Member Sign Up page. 3. Enter your [employer] Access Code exactly as it appears below. You will not need to use this code after youve completed the sign-up process. If you do not sign up before the expiration date, you must request a new code. · [employer] Access Code: [email or web address] Expires: 12/7/2017  9:32 AM 
 
4. Enter the last four digits of your Social Security Number (xxxx) and Date of Birth (mm/dd/yyyy) as indicated and click Submit. You will be taken to the next sign-up page. 5. Create a [employer] ID. This will be your [employer] login ID and cannot be changed, so think of one that is secure and easy to remember. 6. Create a [employer] password. You can change your password at any time. 7. Enter your Password Reset Question and Answer. This can be used at a later time if you forget your password. 8. Enter your e-mail address. You will receive e-mail notification when new information is available in [address] 9. Click Sign Up. You can now view and download portions of your medical record. 10. Click the Download Summary menu link to download a portable copy of your medical information. If you have questions, please visit the Frequently Asked Questions section of the [employer] website. Remember, [employer] is NOT to be used for urgent needs. For medical emergencies, dial 911. Now available from your iPhone and Android! Please provide this summary of care documentation to your next provider. Your primary care clinician is listed as Gareth Baez. If you have any questions after today's visit, please call [phone].

## 2017-09-08 NOTE — PROGRESS NOTES
Cee Melo is a 80 y.o. female who presents for follow up on medications. Last seen June 29. In with daughter. The family stopped the Namenda as they thought it changed her personality. Family does not want additional memory medication. She is not eating well per family. Not much of an appetite. Family is preparing meals. Using supplement drinks prn. Weight stable. Normal daily BM. Normal urination.  Sleeping well at night. Napping during the day per daughter.      She denies leg swelling. Is on dyazide, family thinks daily. She is not on ditropan, no change in incontinence, wears depends. Has glaucoma, up to date on eye exam.       BP elevated today. On verapamil and dyazide. Not checking BP at home. Denies dizziness, headaches or change in exercise tolerance, minimally active. Past Medical History:   Diagnosis Date    DM Renal Manif Type II 2/11/2010    GERD (gastroesophageal reflux disease) 2/11/2010    Glaucoma, open angle 10/29/2010    Headache     HTN (hypertension) 2/11/2010    Hypercholesterolemia     Mixed hyperlipidemia 2/11/2010    Type II or unspecified type diabetes mellitus with renal manifestations, not stated as uncontrolled 2/11/2010       Family History   Problem Relation Age of Onset    Cancer Mother      lung    Hypertension Mother     Cancer Brother      stomach    Hypertension Brother     Other Father      accidental death when pt was a little girl    Diabetes Brother        Social History     Social History    Marital status:      Spouse name: N/A    Number of children: N/A    Years of education: N/A     Occupational History    Not on file.      Social History Main Topics    Smoking status: Never Smoker    Smokeless tobacco: Never Used    Alcohol use No    Drug use: No    Sexual activity: No     Other Topics Concern    Not on file     Social History Narrative       Current Outpatient Prescriptions on File Prior to Visit Medication Sig Dispense Refill    triamterene-hydroCHLOROthiazide (DYAZIDE) 37.5-25 mg per capsule Take 1 Cap by mouth daily. As needed for swelling. (Patient taking differently: Take 1 Cap by mouth daily. Taking once a day) 30 Cap 2    metFORMIN (GLUCOPHAGE) 1,000 mg tablet take 1 tablet by mouth twice a day with meals 180 Tab 1    pravastatin (PRAVACHOL) 40 mg tablet take 1 tablet by mouth NIGHTLY 90 Tab 2    verapamil ER (CALAN-SR) 240 mg CR tablet take 1 tablet by mouth once daily 90 Tab 2    aspirin delayed-release 81 mg tablet Take 81 mg by mouth daily. Taking every other day  Indications: myocardial infarction prevention      acetaminophen (TYLENOL) 325 mg tablet Take 325-650 mg by mouth every six (6) hours as needed for Pain. Indications: HEADACHE DISORDER       No current facility-administered medications on file prior to visit. Review of Systems      Objective:     Visit Vitals    /76    Pulse 87    Temp 98 °F (36.7 °C) (Oral)    Resp 16    Ht 5' 6\" (1.676 m)    Wt 202 lb (91.6 kg)    SpO2 99%    BMI 32.6 kg/m2     Gen: well appearing female  HEENT:   PERRL,normal conjunctiva. External ear and canals normal, TMs no opacification or erythema,  OP no erythema, no exudates, MMM  Neck:  Supple. Thyroid normal size, nontender, without nodules. No masses or LAD  Resp:  No wheezing, no rhonchi, no rales. CV:  RRR, normal S1S2, no murmur. GI: soft, nontender, without masses. No hepatosplenomegaly. Extrem:  +2 pulses, no edema, warm distally      Assessment/Plan:     1. Essential hypertension, benign- Recommend following a lower sodium diet and regular cardiovascular exercise. Blood pressure goal is less than 130/85 on average. Advised compliance with blood pressure medication and regular follow up. - METABOLIC PANEL, COMPREHENSIVE    2. Mixed hyperlipidemia- Recommend AHA diet and regular cardiovascular exercise. Continue statin therapy. - LIPID PANEL    3.  Controlled type 2 diabetes mellitus without complication, without long-term current use of insulin ([facility] [address]- Recommend compliance with diabetic diet. Continue medications for diabetes. Monitor blood sugar readings as discussed. Keep up on regular diabetic eye exams.    - HEMOGLOBIN A1C W/O EAG    4. Dementia without behavioral disturbance, unspecified dementia type-family does not want a trial of aricept. Did not tolerate namenda      5. Vitamin D deficiency    - VITAMIN D, 25 HYDROXY    6. Open-angle glaucoma, unspecified glaucoma stage, unspecified laterality, unspecified open-angle glaucoma type- up to date on eye exam.       Follow-up Disposition:  Return in about 3 months (around 12/8/2017) for follow up on medications.  Angel Montemayor MD

## 2017-09-08 NOTE — PROGRESS NOTES
Chief Complaint   Patient presents with    Diabetes     Pt Fasting. 3 month follow up    Hypertension       Reviewed record In preparation for visit and have obtained necessary documentation    1. Have you been to the ER, urgent care clinic since your last visit? Hospitalized since your last visit? NO    2. Have you seen or consulted any other health care providers outside of the [facility] since your last visit? Include any pap smears or colon screening. NO    Patient does not have advance directive on file and has received paperwork.

## 2017-09-08 NOTE — PATIENT INSTRUCTIONS
Take the Triamterene/HCTZ (dyazide) once a day regularly for swelling and blood pressure  and continue the verapamil for blood pressure. Blood pressure goal is less than 150 upper number all of the time. Recommend vitamin D 2,000 iu daily.

## 2017-09-09 LAB
25(OH)D3+25(OH)D2 SERPL-MCNC: 21.4 NG/ML (ref 30–100)
ALBUMIN SERPL-MCNC: 3.7 G/DL (ref 3.5–4.7)
ALBUMIN/GLOB SERPL: 1 {RATIO} (ref 1.2–2.2)
ALP SERPL-CCNC: 87 IU/L (ref 39–117)
ALT SERPL-CCNC: 6 IU/L (ref 0–32)
AST SERPL-CCNC: 13 IU/L (ref 0–40)
BILIRUB SERPL-MCNC: 0.2 MG/DL (ref 0–1.2)
BUN SERPL-MCNC: 7 MG/DL (ref 8–27)
BUN/CREAT SERPL: 7 (ref 12–28)
CALCIUM SERPL-MCNC: 8.4 MG/DL (ref 8.7–10.3)
CHLORIDE SERPL-SCNC: 100 MMOL/L (ref 96–106)
CHOLEST SERPL-MCNC: 152 MG/DL (ref 100–199)
CO2 SERPL-SCNC: 26 MMOL/L (ref 18–29)
CREAT SERPL-MCNC: 1.04 MG/DL (ref 0.57–1)
GLOBULIN SER CALC-MCNC: 3.6 G/DL (ref 1.5–4.5)
GLUCOSE SERPL-MCNC: 100 MG/DL (ref 65–99)
HBA1C MFR BLD: 5.8 % (ref 4.8–5.6)
HDLC SERPL-MCNC: 68 MG/DL
LDLC SERPL CALC-MCNC: 67 MG/DL (ref 0–99)
POTASSIUM SERPL-SCNC: 4 MMOL/L (ref 3.5–5.2)
PROT SERPL-MCNC: 7.3 G/DL (ref 6–8.5)
SODIUM SERPL-SCNC: 142 MMOL/L (ref 134–144)
TRIGL SERPL-MCNC: 87 MG/DL (ref 0–149)
VLDLC SERPL CALC-MCNC: 17 MG/DL (ref 5–40)

## 2017-09-26 NOTE — PROGRESS NOTES
Notify patient's daughter labs show diabetes control improving. Vitamin D improving, take 2,000 iu vitamin D3 once a day. Cholesterol controlled. Kidney function is stable. Follow diet, no change in medications. Follow up in 3 months.

## 2017-09-26 NOTE — PROGRESS NOTES
Verified two patient identifiers, name and .  Handy Castillo (daughter) provided with Cy Tomy  lab results per  request. No questions at this time

## 2017-11-27 DIAGNOSIS — I10 ESSENTIAL HYPERTENSION, BENIGN: Chronic | ICD-10-CM

## 2017-11-27 DIAGNOSIS — R60.9 EDEMA, UNSPECIFIED TYPE: ICD-10-CM

## 2017-11-27 RX ORDER — TRIAMTERENE AND HYDROCHLOROTHIAZIDE 37.5; 25 MG/1; MG/1
CAPSULE ORAL
Qty: 30 CAP | Refills: 2 | Status: SHIPPED | OUTPATIENT
Start: 2017-11-27 | End: 2017-11-29 | Stop reason: SDUPTHER

## 2017-11-29 DIAGNOSIS — I10 ESSENTIAL HYPERTENSION, BENIGN: Chronic | ICD-10-CM

## 2017-11-29 DIAGNOSIS — R60.9 EDEMA, UNSPECIFIED TYPE: ICD-10-CM

## 2017-11-29 RX ORDER — VERAPAMIL HYDROCHLORIDE 240 MG/1
TABLET, FILM COATED, EXTENDED RELEASE ORAL
Qty: 90 TAB | Refills: 2 | Status: SHIPPED | OUTPATIENT
Start: 2017-11-29 | End: 2018-10-11 | Stop reason: SDUPTHER

## 2017-11-29 RX ORDER — PRAVASTATIN SODIUM 40 MG/1
TABLET ORAL
Qty: 90 TAB | Refills: 2 | Status: SHIPPED | OUTPATIENT
Start: 2017-11-29 | End: 2018-10-27 | Stop reason: SDUPTHER

## 2017-11-29 RX ORDER — METFORMIN HYDROCHLORIDE 1000 MG/1
TABLET ORAL
Qty: 180 TAB | Refills: 1 | Status: SHIPPED | OUTPATIENT
Start: 2017-11-29 | End: 2018-04-30 | Stop reason: SDUPTHER

## 2017-11-29 RX ORDER — TRIAMTERENE AND HYDROCHLOROTHIAZIDE 37.5; 25 MG/1; MG/1
CAPSULE ORAL
Qty: 30 CAP | Refills: 2 | Status: SHIPPED | OUTPATIENT
Start: 2017-11-29 | End: 2018-02-09

## 2017-11-29 NOTE — TELEPHONE ENCOUNTER
Requested Prescriptions     Pending Prescriptions Disp Refills    pravastatin (PRAVACHOL) 40 mg tablet 90 Tab 2    verapamil ER (CALAN-SR) 240 mg CR tablet 90 Tab 2    metFORMIN (GLUCOPHAGE) 1,000 mg tablet 180 Tab 1    triamterene-hydroCHLOROthiazide (DYAZIDE) 37.5-25 mg per capsule 30 Cap 2         Last Office Visit: 09/08/2017    Upcoming Appointment: 12/08/2017

## 2017-12-08 ENCOUNTER — OFFICE VISIT (OUTPATIENT)
Dept: INTERNAL MEDICINE CLINIC | Age: 82
End: 2017-12-08

## 2017-12-08 ENCOUNTER — HOSPITAL ENCOUNTER (OUTPATIENT)
Dept: LAB | Age: 82
Discharge: HOME | End: 2017-12-08
Payer: MEDICARE

## 2017-12-08 VITALS
SYSTOLIC BLOOD PRESSURE: 141 MMHG | OXYGEN SATURATION: 99 % | BODY MASS INDEX: 30.05 KG/M2 | HEIGHT: 66 IN | TEMPERATURE: 98 F | RESPIRATION RATE: 16 BRPM | WEIGHT: 187 LBS | DIASTOLIC BLOOD PRESSURE: 76 MMHG | HEART RATE: 86 BPM

## 2017-12-08 DIAGNOSIS — E78.2 MIXED HYPERLIPIDEMIA: Chronic | ICD-10-CM

## 2017-12-08 DIAGNOSIS — Z23 ENCOUNTER FOR IMMUNIZATION: ICD-10-CM

## 2017-12-08 DIAGNOSIS — I10 ESSENTIAL HYPERTENSION, BENIGN: Chronic | ICD-10-CM

## 2017-12-08 DIAGNOSIS — R63.4 WEIGHT LOSS: Primary | ICD-10-CM

## 2017-12-08 DIAGNOSIS — E11.9 CONTROLLED TYPE 2 DIABETES MELLITUS WITHOUT COMPLICATION, WITHOUT LONG-TERM CURRENT USE OF INSULIN (HCC): Chronic | ICD-10-CM

## 2017-12-08 DIAGNOSIS — F03.90 DEMENTIA WITHOUT BEHAVIORAL DISTURBANCE, UNSPECIFIED DEMENTIA TYPE: ICD-10-CM

## 2017-12-08 DIAGNOSIS — R63.4 LOSS OF WEIGHT: Primary | ICD-10-CM

## 2017-12-08 PROCEDURE — 84443 ASSAY THYROID STIM HORMONE: CPT

## 2017-12-08 PROCEDURE — 83036 HEMOGLOBIN GLYCOSYLATED A1C: CPT

## 2017-12-08 PROCEDURE — 36415 COLL VENOUS BLD VENIPUNCTURE: CPT

## 2017-12-08 PROCEDURE — 80053 COMPREHEN METABOLIC PANEL: CPT

## 2017-12-08 PROCEDURE — 85027 COMPLETE CBC AUTOMATED: CPT

## 2017-12-08 RX ORDER — LATANOPROST 50 UG/ML
SOLUTION/ DROPS OPHTHALMIC
COMMUNITY
Start: 2017-12-07

## 2017-12-08 RX ORDER — OXYBUTYNIN CHLORIDE 5 MG/1
TABLET, EXTENDED RELEASE ORAL
Refills: 0 | COMMUNITY
Start: 2017-09-30 | End: 2017-12-08

## 2017-12-08 RX ORDER — DONEPEZIL HYDROCHLORIDE 5 MG/1
5 TABLET, FILM COATED ORAL
Qty: 30 TAB | Refills: 5 | Status: SHIPPED | OUTPATIENT
Start: 2017-12-08 | End: 2018-02-09

## 2017-12-08 NOTE — PROGRESS NOTES
Brigitte Valentine is a 80 y.o. female who presents for follow up. In with daughter. Reviewed medications. Has been off aspirin, ran out. Treated for DM. Following diabetic diet. On metformin daily. Up to date on eye exam. Per daughter poor appetite. Reviewed diet:  Breakfast- oatmeal, Lunch- skips. Drinks coffee and V8 splash, dinner- fish sticks, applesauce. may have ensure at times. No abdominal pain. No nausea. No diarrhea. Weight loss, 204# in September. Today, 187#. Never a smoker. Taking HTN medications, BP controlled. No dizziness. No headaches. Taking diuretic daily. Has memory loss, per daughter worse over 2 months. Prior namenda, not tolerated, too sedated. Taking statin daily, no side effects. On low fat diet. Past Medical History:   Diagnosis Date    DM Renal Manif Type II 2/11/2010    GERD (gastroesophageal reflux disease) 2/11/2010    Glaucoma, open angle 10/29/2010    Headache     HTN (hypertension) 2/11/2010    Hypercholesterolemia     Mixed hyperlipidemia 2/11/2010    Type II or unspecified type diabetes mellitus with renal manifestations, not stated as uncontrolled(250.40) ([facility] [address] 2/11/2010       Family History   Problem Relation Age of Onset    Cancer Mother      lung    Hypertension Mother     Cancer Brother      stomach    Hypertension Brother     Other Father      accidental death when pt was a little girl    Diabetes Brother        Social History     Social History    Marital status:      Spouse name: N/A    Number of children: N/A    Years of education: N/A     Occupational History    Not on file.      Social History Main Topics    Smoking status: Never Smoker    Smokeless tobacco: Never Used    Alcohol use No    Drug use: No    Sexual activity: No     Other Topics Concern    Not on file     Social History Narrative       Current Outpatient Prescriptions on File Prior to Visit   Medication Sig Dispense Refill    pravastatin (PRAVACHOL) 40 mg tablet take 1 tablet by mouth NIGHTLY 90 Tab 2    verapamil ER (CALAN-SR) 240 mg CR tablet take 1 tablet by mouth once daily 90 Tab 2    metFORMIN (GLUCOPHAGE) 1,000 mg tablet take 1 tablet by mouth twice a day with meals 180 Tab 1    triamterene-hydroCHLOROthiazide (DYAZIDE) 37.5-25 mg per capsule take 1 capsule by mouth once daily if needed for SWELLING 30 Cap 2    acetaminophen (TYLENOL) 325 mg tablet Take 325-650 mg by mouth every six (6) hours as needed for Pain. Indications: HEADACHE DISORDER      aspirin delayed-release 81 mg tablet Take 81 mg by mouth daily. Taking every other day  Indications: myocardial infarction prevention       No current facility-administered medications on file prior to visit. Review of Systems  Pertinent items are noted in HPI. Objective:     Visit Vitals    /76 (BP 1 Location: Left arm, BP Patient Position: Sitting)    Pulse 86    Temp 98 °F (36.7 °C) (Oral)    Resp 16    Ht 5' 6\" (1.676 m)    Wt 187 lb (84.8 kg)    SpO2 99%    BMI 30.18 kg/m2     Gen: well appearing female  HEENT:   PERRL,normal conjunctiva. External ear and canals normal, TMs no opacification or erythema,  OP no erythema, no exudates, MMM  Neck: No masses or LAD  Resp:  No wheezing, no rhonchi, no rales. CV:  RRR, normal W5K9, 3/6 holosystolic murmur. GI: soft, nontender, without masses. No hepatosplenomegaly. Extrem:  +2 pulses, no edema, warm distally      Assessment/Plan:     1. Encounter for immunization    - Influenza virus vaccine ([address]) 72 years and older ([address])  - Administration fee () for Medicare insured patients    2. Weight loss-monitor food intake, have a supplement daily. Increase protein in diet. - XR CHEST PA LAT; Future  - TSH 3RD GENERATION    3. Controlled type 2 diabetes mellitus without complication, without long-term current use of insulin ([facility] [address]- Recommend compliance with diabetic diet.  Continue medications for diabetes. Monitor blood sugar readings as discussed. Keep up on regular diabetic eye exams. Resume aspirin 81mg once a day. - CBC W/O DIFF  - METABOLIC PANEL, COMPREHENSIVE  - HEMOGLOBIN A1C W/O EAG    4. Mixed hyperlipidemia- Recommend AHA diet. Continue statin therapy. 5. Essential hypertension, benign- Recommend following a lower sodium diet and stay active. Blood pressure goal is less than 130/85 on average. Advised compliance with blood pressure medication and regular follow up. - CBC W/O DIFF  - METABOLIC PANEL, COMPREHENSIVE    6. Dementia without behavioral disturbance, unspecified dementia type    - donepezil (ARICEPT) 5 mg tablet; Take 1 Tab by mouth nightly. Dispense: 30 Tab; Refill: 5    Follow-up Disposition:  Return in about 2 months (around 2/8/2018) for follow up pending labs and 2 months.  Niels Byers MD

## 2017-12-08 NOTE — PROGRESS NOTES
Chief Complaint   Patient presents with    Medication Evaluation     pt nonfasting. 3 month follow up    Hypertension     3 month follow up    Immunization/Injection     flu shot     Visit Vitals    /76 (BP 1 Location: Left arm, BP Patient Position: Sitting)    Pulse 86    Temp 98 °F (36.7 °C) (Oral)    Resp 16    Ht 5' 6\" (1.676 m)    Wt 187 lb (84.8 kg)    SpO2 99%    BMI 30.18 kg/m2     Reviewed record In preparation for visit and have obtained necessary documentation    1. Have you been to the ER, urgent care clinic since your last visit? Hospitalized since your last visit? NO    2. Have you seen or consulted any other health care providers outside of the [address] [place] [address] since your last visit? Include any pap smears or colon screening. NO    Patient does not have advance directive on file.

## 2017-12-08 NOTE — MR AVS SNAPSHOT
Visit Information Date & Time Provider Department Dept. Phone Encounter #  
 12/8/2017  9:00 AM Lana Ellison, [address] [place] [address],[address] [phone] [id number] Follow-up Instructions Return in about 2 months (around 2/8/2018) for follow up pending labs and 2 months. Abenabushra Bansal Upcoming Health Maintenance Date Due DTaP/Tdap/Td series (1 - Tdap) 1/16/1953 ZOSTER VACCINE AGE 60> 11/16/1991 Pneumococcal 65+ Low/Medium Risk (2 of 2 - PCV13) 7/8/2014 FOOT EXAM Q1 6/10/2016 EYE EXAM RETINAL OR DILATED Q1 6/10/2016 MICROALBUMIN Q1 11/11/2016 Influenza Age 5 to Adult 8/1/2017 OSTEOPOROSIS SCREENING (DEXA) 6/13/2020* MEDICARE YEARLY EXAM 12/9/2017 HEMOGLOBIN A1C Q6M 3/8/2018 LIPID PANEL Q1 9/8/2018 *Topic was postponed. The date shown is not the original due date. Allergies as of 12/8/2017  Review Complete On: 12/8/2017 By: Dorothy Gonzalez LPN Severity Noted Reaction Type Reactions Ace Inhibitors  06/11/2010   Side Effect Cough Codeine  03/02/2010   Intolerance Nausea and Vomiting Current Immunizations  Reviewed on 11/19/2014 Name Date Influenza High Dose Vaccine PF  Incomplete, 12/8/2016 Influenza Vaccine 11/19/2014, 11/18/2013 Influenza Vaccine (Quad) PF 11/11/2015 Influenza Vaccine Split 11/19/2012  2:48 PM, 9/27/2011, 10/27/2010 Influenza Vaccine Whole 10/5/2009 Pneumococcal Polysaccharide (PPSV-23) 7/8/2013 Not reviewed this visit You Were Diagnosed With   
  
 Codes Comments Weight loss    -  Primary ICD-10-CM: R63.4 ICD-9-CM: 783.21 Encounter for immunization     ICD-10-CM: B40 ICD-9-CM: V03.89 Controlled type 2 diabetes mellitus without complication, without long-term current use of insulin [facility]     ICD-10-CM: E11.9 ICD-9-CM: 250.00 Mixed hyperlipidemia     ICD-10-CM: E78.2 ICD-9-CM: 272.2 Essential hypertension, benign     ICD-10-CM: I10 
ICD-9-CM: 401.1 Dementia without behavioral disturbance, unspecified dementia type     ICD-10-CM: F03.90 ICD-9-CM: 294.20 Vitals BP Pulse Temp Resp Height(growth percentile) Weight(growth percentile) 141/76 (BP 1 Location: Left arm, BP Patient Position: Sitting) 86 98 °F (36.7 °C) (Oral) 16 5' 6\" (1.676 m) 187 lb (84.8 kg) SpO2 BMI OB Status Smoking Status 99% 30.18 kg/m2 Hysterectomy Never Smoker BMI and BSA Data Body Mass Index Body Surface Area  
 30.18 kg/m 2 1.99 m 2 Preferred Pharmacy Pharmacy Name Phone [address], [address] [phone] Your Updated Medication List  
  
   
This list is accurate as of: 12/8/17  9:42 AM.  Always use your most recent med list.  
  
  
  
  
 aspirin delayed-release 81 mg tablet Take 81 mg by mouth daily. Taking every other day  Indications: myocardial infarction prevention  
  
 donepezil 5 mg tablet Commonly known as:  ARICEPT Take 1 Tab by mouth nightly. latanoprost 0.005 % ophthalmic solution Commonly known as:  XALATAN  
  
 metFORMIN 1,000 mg tablet Commonly known as:  GLUCOPHAGE  
take 1 tablet by mouth twice a day with meals  
  
 pravastatin 40 mg tablet Commonly known as:  PRAVACHOL  
take 1 tablet by mouth NIGHTLY  
  
 triamterene-hydroCHLOROthiazide 37.5-25 mg per capsule Commonly known as:  DYAZIDE  
take 1 capsule by mouth once daily if needed for SWELLING  
  
 TYLENOL 325 mg tablet Generic drug:  acetaminophen Take 325-650 mg by mouth every six (6) hours as needed for Pain. Indications: HEADACHE DISORDER  
  
 verapamil  mg CR tablet Commonly known as:  CALAN-SR  
take 1 tablet by mouth once daily Prescriptions Sent to Pharmacy Refills  
 donepezil (ARICEPT) 5 mg tablet 5 Sig: Take 1 Tab by mouth nightly. Class: Normal  
 [facility]: [facility] Ph #: [phone] Route: Oral  
  
We Performed the Following ADMIN INFLUENZA VIRUS VAC [ [facility]] CBC W/O DIFF [57969 CPT(R)] HEMOGLOBIN A1C W/O EAG [24990 CPT(R)] INFLUENZA VIRUS VACCINE, HIGH DOSE SEASONAL, PRESERVATIVE FREE [16950 CPT(R)] METABOLIC PANEL, COMPREHENSIVE [37826 CPT(R)] TSH 3RD GENERATION [43256 CPT(R)] Follow-up Instructions Return in about 2 months (around 2/8/2018) for follow up pending labs and 2 months. .  
  
To-Do List   
 12/08/2017 Imaging:  XR CHEST PA LAT Patient Instructions RESUME THE ASPIRIN 81MG DAILY WITH FOOD. START THE VITAMIN D 2,000 iu DAILY. INCREASE PROTEIN IN DIET TO SLOW WEIGHT LOSS. GRAZING DIET, 5 SMALL MEALS. ADD A SUPPLEMENT DRINK, SPLIT ONE CAN OF ENSURE MORNING AND NIGHT. OR CARNATION INSTANT BREAKFAST. ARICEPT 5MG ONCE A DAY FOR MEMORY. STOP IF CAUSES NAUSEA. Introducing [facility]! [employer] introduces [employer] patient portal. Now you can access parts of your medical record, email your doctor's office, and request medication refills online. 1. In your internet browser, go to [email or web address] 2. Click on the First Time User? Click Here link in the Sign In box. You will see the New Member Sign Up page. 3. Enter your Scyron Access Code exactly as it appears below. You will not need to use this code after youve completed the sign-up process. If you do not sign up before the expiration date, you must request a new code. · Scyron Access Code: [email or web address] Expires: 3/8/2018  9:42 AM 
 
4. Enter the last four digits of your Social Security Number (xxxx) and Date of Birth (mm/dd/yyyy) as indicated and click Submit. You will be taken to the next sign-up page. 5. Create a Collarityt ID. This will be your Scyron login ID and cannot be changed, so think of one that is secure and easy to remember. 6. Create a Collar[employer]t password. You can change your password at any time. 7. Enter your Password Reset Question and Answer. This can be used at a later time if you forget your password. 8. Enter your e-mail address. You will receive e-mail notification when new information is available in [address] 9. Click Sign Up. You can now view and download portions of your medical record. 10. Click the Download Summary menu link to download a portable copy of your medical information. If you have questions, please visit the Frequently Asked Questions section of the [employer] website. Remember, [employer]ist is NOT to be used for urgent needs. For medical emergencies, dial 911. Now available from your iPhone and Android! Please provide this summary of care documentation to your next provider. Your primary care clinician is listed as Felicita Morris. If you have any questions after today's visit, please call [phone].

## 2017-12-08 NOTE — PATIENT INSTRUCTIONS
RESUME THE ASPIRIN 81MG DAILY WITH FOOD. START THE VITAMIN D 2,000 iu DAILY. INCREASE PROTEIN IN DIET TO SLOW WEIGHT LOSS. GRAZING DIET, 5 SMALL MEALS. ADD A SUPPLEMENT DRINK, SPLIT ONE CAN OF ENSURE MORNING AND NIGHT. OR CARNATION INSTANT BREAKFAST. ARICEPT 5MG ONCE A DAY FOR MEMORY. STOP IF CAUSES NAUSEA.

## 2017-12-09 LAB
ALBUMIN SERPL-MCNC: 4 G/DL (ref 3.5–4.7)
ALBUMIN/GLOB SERPL: 1.1 {RATIO} (ref 1.2–2.2)
ALP SERPL-CCNC: 92 IU/L (ref 39–117)
ALT SERPL-CCNC: 11 IU/L (ref 0–32)
AST SERPL-CCNC: 11 IU/L (ref 0–40)
BILIRUB SERPL-MCNC: 0.2 MG/DL (ref 0–1.2)
BUN SERPL-MCNC: 16 MG/DL (ref 8–27)
BUN/CREAT SERPL: 10 (ref 12–28)
CALCIUM SERPL-MCNC: 9.3 MG/DL (ref 8.7–10.3)
CHLORIDE SERPL-SCNC: 101 MMOL/L (ref 96–106)
CO2 SERPL-SCNC: 25 MMOL/L (ref 18–29)
CREAT SERPL-MCNC: 1.53 MG/DL (ref 0.57–1)
ERYTHROCYTE [DISTWIDTH] IN BLOOD BY AUTOMATED COUNT: 17.9 % (ref 12.3–15.4)
GFR SERPLBLD CREATININE-BSD FMLA CKD-EPI: 31 ML/MIN/1.73
GFR SERPLBLD CREATININE-BSD FMLA CKD-EPI: 35 ML/MIN/1.73
GLOBULIN SER CALC-MCNC: 3.5 G/DL (ref 1.5–4.5)
GLUCOSE SERPL-MCNC: 115 MG/DL (ref 65–99)
HBA1C MFR BLD: 6 % (ref 4.8–5.6)
HCT VFR BLD AUTO: 33 % (ref 34–46.6)
HGB BLD-MCNC: 10.3 G/DL (ref 11.1–15.9)
MCH RBC QN AUTO: 27 PG (ref 26.6–33)
MCHC RBC AUTO-ENTMCNC: 31.2 G/DL (ref 31.5–35.7)
MCV RBC AUTO: 86 FL (ref 79–97)
PLATELET # BLD AUTO: 302 X10E3/UL (ref 150–379)
POTASSIUM SERPL-SCNC: 4.5 MMOL/L (ref 3.5–5.2)
PROT SERPL-MCNC: 7.5 G/DL (ref 6–8.5)
RBC # BLD AUTO: 3.82 X10E6/UL (ref 3.77–5.28)
SODIUM SERPL-SCNC: 141 MMOL/L (ref 134–144)
TSH SERPL DL<=0.005 MIU/L-ACNC: 4.83 UIU/ML (ref 0.45–4.5)
WBC # BLD AUTO: 6.6 X10E3/UL (ref 3.4–10.8)

## 2017-12-10 ENCOUNTER — TELEPHONE (OUTPATIENT)
Dept: INTERNAL MEDICINE CLINIC | Age: 82
End: 2017-12-10

## 2017-12-10 NOTE — TELEPHONE ENCOUNTER
Notify daughter kidney function has worsened. Stop the triamterene/HCTZ, increase water intake. Diabetes controlled. Thyroid mildly low. No treatment needed yet. Follow up in 3 months with labs at that time.

## 2017-12-11 NOTE — TELEPHONE ENCOUNTER
Reviewed results with daughter, Mitchel Serrato (HIPPA confirmed). She verbalized understanding of new recommendations. Lopez Bachelor states pt is still not eating much & would like to know if Dr. Valentin Carlson has any futher recs other than what were discussed at [address]. Will route to PCP to advise & call back.

## 2017-12-12 NOTE — TELEPHONE ENCOUNTER
Returned call to patient's daughter, Skyla Thomas on [facility]. Advised of Dr. Peter Mcnair additional recommendations. Ms. Keaton Case verbalized understanding and states no further questions at this time.

## 2017-12-12 NOTE — TELEPHONE ENCOUNTER
Remind patient's daughter of recommendations at last appointment. INCREASE PROTEIN IN DIET TO SLOW WEIGHT LOSS. GRAZING DIET, 5 SMALL MEALS. ADD A SUPPLEMENT DRINK, SPLIT ONE CAN OF ENSURE MORNING AND NIGHT. OR CARNATION INSTANT BREAKFAST. The patient is overweight, so eating small amount and some weight loss is not a problem.  Follow up as discussed

## 2017-12-23 PROBLEM — E11.21 TYPE 2 DIABETES MELLITUS WITH NEPHROPATHY (HCC): Status: ACTIVE | Noted: 2017-12-23

## 2018-01-18 ENCOUNTER — TELEPHONE (OUTPATIENT)
Dept: INTERNAL MEDICINE CLINIC | Age: 83
End: 2018-01-18

## 2018-01-18 NOTE — TELEPHONE ENCOUNTER
----- Message from Zac Garcia sent at 1/18/2018 11:33 AM EST -----  Regarding: Dr. Catia Shaver: [phone]  Ms. Knox, pt. Daughter, calling in reference pt. Medicare Wellness Visit. Pt. Is currently scheduled for an appt in Feb. Ms. Partha Saul would like to know whether the pt.will need to be scheduled for another appt.     Message copied/pasted from [facility]

## 2018-01-19 NOTE — TELEPHONE ENCOUNTER
Called patient's daughter Palma Fontanez. Two patient identifiers verified. Advised we will do Medicare wellness at [address] [facility] on 2/9/18.

## 2018-02-09 ENCOUNTER — HOSPITAL ENCOUNTER (OUTPATIENT)
Dept: LAB | Age: 83
Discharge: HOME | End: 2018-02-09
Payer: MEDICARE

## 2018-02-09 ENCOUNTER — OFFICE VISIT (OUTPATIENT)
Dept: INTERNAL MEDICINE CLINIC | Age: 83
End: 2018-02-09

## 2018-02-09 VITALS
RESPIRATION RATE: 16 BRPM | TEMPERATURE: 98.3 F | BODY MASS INDEX: 30.18 KG/M2 | DIASTOLIC BLOOD PRESSURE: 82 MMHG | SYSTOLIC BLOOD PRESSURE: 145 MMHG | HEIGHT: 66 IN | OXYGEN SATURATION: 99 % | HEART RATE: 88 BPM | WEIGHT: 187.8 LBS

## 2018-02-09 DIAGNOSIS — N28.9 RENAL INSUFFICIENCY: ICD-10-CM

## 2018-02-09 DIAGNOSIS — F02.80 ALZHEIMER'S DEMENTIA WITHOUT BEHAVIORAL DISTURBANCE, UNSPECIFIED TIMING OF DEMENTIA ONSET: Chronic | ICD-10-CM

## 2018-02-09 DIAGNOSIS — I10 ESSENTIAL HYPERTENSION, BENIGN: Chronic | ICD-10-CM

## 2018-02-09 DIAGNOSIS — E78.2 MIXED HYPERLIPIDEMIA: Chronic | ICD-10-CM

## 2018-02-09 DIAGNOSIS — Z00.00 MEDICARE ANNUAL WELLNESS VISIT, SUBSEQUENT: ICD-10-CM

## 2018-02-09 DIAGNOSIS — R79.89 ELEVATED TSH: Primary | ICD-10-CM

## 2018-02-09 DIAGNOSIS — E11.9 CONTROLLED TYPE 2 DIABETES MELLITUS WITHOUT COMPLICATION, WITHOUT LONG-TERM CURRENT USE OF INSULIN (HCC): Chronic | ICD-10-CM

## 2018-02-09 DIAGNOSIS — G30.9 ALZHEIMER'S DEMENTIA WITHOUT BEHAVIORAL DISTURBANCE, UNSPECIFIED TIMING OF DEMENTIA ONSET: Chronic | ICD-10-CM

## 2018-02-09 PROCEDURE — 36415 COLL VENOUS BLD VENIPUNCTURE: CPT

## 2018-02-09 PROCEDURE — 80048 BASIC METABOLIC PNL TOTAL CA: CPT

## 2018-02-09 PROCEDURE — 84443 ASSAY THYROID STIM HORMONE: CPT

## 2018-02-09 NOTE — MR AVS SNAPSHOT
[address] [facility] 
[phone] Patient: Ritika Raymond MRN:  [id number] Visit Information Date & Time Provider Department Dept. Phone Encounter #  
 2/9/2018 10:15 AM Steve Choe, [address] [phone] [id number] Follow-up Instructions Return for follow up pending labs and 3 months. Zuleika Dies Upcoming Health Maintenance Date Due DTaP/Tdap/Td series (1 - Tdap) 1[birth year]16/1953 ZOSTER VACCINE AGE 60> 11/16/1991 Pneumococcal 65+ Low/Medium Risk (2 of 2 - PCV13) 7/8/2014 FOOT EXAM Q1 6/10/2016 MICROALBUMIN Q1 11/11/2016 OSTEOPOROSIS SCREENING (DEXA) 6/13/2020* HEMOGLOBIN A1C Q6M 6/8/2018 LIPID PANEL Q1 9/8/2018 EYE EXAM RETINAL OR DILATED Q1 11/21/2018 MEDICARE YEARLY EXAM 2/10/2019 *Topic was postponed. The date shown is not the original due date. Allergies as of 2/9/2018  Review Complete On: 2/9/2018 By: Steve Choe MD  
  
 Severity Noted Reaction Type Reactions Ace Inhibitors  06/11/2010   Side Effect Cough Codeine  03/02/2010   Intolerance Nausea and Vomiting Current Immunizations  Reviewed on 12/8/2017 Name Date Influenza High Dose Vaccine PF 12/8/2017, 12/8/2016 Influenza Vaccine 11/19/2014, 11/18/2013 Influenza Vaccine (Quad) PF 11/11/2015 Influenza Vaccine Split 11/19/2012  2:48 PM, 9/27/2011, 10/27/2010 Influenza Vaccine Whole 10/5/2009 Pneumococcal Polysaccharide (PPSV-23) 7/8/2013 Not reviewed this visit You Were Diagnosed With   
  
 Codes Comments Elevated TSH    -  Primary ICD-10-CM: R94.6 ICD-9-CM: 794.5 Controlled type 2 diabetes mellitus without complication, without long-term current use of insulin ([facility]     ICD-10-CM: E11.9 ICD-9-CM: 250.00 Mixed hyperlipidemia     ICD-10-CM: E78.2 ICD-9-CM: 272.2 Alzheimer's dementia without behavioral disturbance, unspecified timing of dementia onset     ICD-10-CM: G30.9, F02.80 ICD-9-CM: 331.0, 294.10 Essential hypertension, benign     ICD-10-CM: I10 
ICD-9-CM: 401.1 Renal insufficiency     ICD-10-CM: N28.9 ICD-9-CM: 593.9 Medicare annual wellness visit, subsequent     ICD-10-CM: Z00.00 ICD-9-CM: V70.0 Vitals BP Pulse Temp Resp Height(growth percentile) Weight(growth percentile) 145/82 (BP 1 Location: Left arm, BP Patient Position: Sitting) 88 98.3 °F (36.8 °C) (Oral) 16 5' 6\" (1.676 m) 187 lb 12.8 oz (85.2 kg) SpO2 BMI OB Status Smoking Status 99% 30.31 kg/m2 Hysterectomy Never Smoker BMI and BSA Data Body Mass Index Body Surface Area  
 30.31 kg/m 2 1.99 m 2 Preferred Pharmacy Pharmacy Name Phone [address], [address] [phone] Your Updated Medication List  
  
   
This list is accurate as of: 2/9/18 11:36 AM.  Always use your most recent med list.  
  
  
  
  
 aspirin delayed-release 81 mg tablet Take 81 mg by mouth daily. Taking every other day  Indications: myocardial infarction prevention  
  
 latanoprost 0.005 % ophthalmic solution Commonly known as:  XALATAN  
  
 metFORMIN 1,000 mg tablet Commonly known as:  GLUCOPHAGE  
take 1 tablet by mouth twice a day with meals  
  
 pravastatin 40 mg tablet Commonly known as:  PRAVACHOL  
take 1 tablet by mouth NIGHTLY  
  
 TYLENOL 325 mg tablet Generic drug:  acetaminophen Take 325-650 mg by mouth every six (6) hours as needed for Pain. Indications: HEADACHE DISORDER  
  
 verapamil  mg CR tablet Commonly known as:  CALAN-SR  
take 1 tablet by mouth once daily We Performed the Following METABOLIC PANEL, BASIC [67059 CPT(R)] TSH 3RD GENERATION [47423 CPT(R)] Follow-up Instructions Return for follow up pending labs and 3 months. Gosia Wells Patient Instructions Medicare Wellness Visit, Female The best way to live healthy is to have a healthy lifestyle by eating a well-balanced diet, exercising regularly, limiting alcohol and stopping smoking. Regular physical exams and screening tests are another way to keep healthy. Preventive exams provided by your health care provider can find health problems before they become diseases or illnesses. Preventive services including immunizations, screening tests, monitoring and exams can help you take care of your own health. All people over age 72 should have a pneumovax  and and a prevnar shot to prevent pneumonia. These are once in a lifetime unless you and your provider decide differently. All people over 65 should have a yearly flu shot and a tetanus vaccine every 10 years. A bone mass density to screen for osteoporosis or thinning of the bones should be done every 2 years after 65. Screening for diabetes mellitus with a blood sugar test should be done every year. Glaucoma is a disease of the eye due to increased ocular pressure that can lead to blindness and it should be done every year by an eye professional. 
 
Cardiovascular screening tests that check for elevated lipids (fatty part of blood) which can lead to heart disease and strokes should be done every 5 years. Colorectal screening that evaluates for blood or polyps in your colon should be done yearly as a stool test or every five years as a flexible sigmoidoscope or every 10 years as a colonoscopy up to age 76. Breast cancer screening with a mammogram is recommended biennially  for women age 54-69. Screening for cervical cancer with a pap smear and pelvic exam is recommended for women after age 72 years every 2 years up to age 79 or when the provider and patient decide to stop. If there is a history of cervical abnormalities or other increased risk for cancer then the test is recommended yearly. Hepatitis C screening is also recommended for anyone born between [birth year] through [address]. A shingles vaccine is also recommended once in a lifetime after age 61. Your Medicare Wellness Exam is recommended annually. Here is a list of your current Health Maintenance items with a due date: 
Health Maintenance Due Topic Date Due  
 DTaP/Tdap/Td  (1 - Tdap) 01/16/1953  Shingles Vaccine  11/16/1991  Pneumococcal Vaccine (2 of 2 - PCV13) 07/08/2014 Kip Alexandra Diabetic Foot Care  06/10/2016  Albumin Urine Test  11/11/2016 Kip Alexandra Annual Well Visit  12/09/2017 Introducing [facility]! Stephanie Jane introduces [facility] [employer] patient portal. Now you can access parts of your medical record, email your doctor's office, and request medication refills online. 1. In your internet browser, go to [email or web address] 2. Click on the First Time User? Click Here link in the Sign In box. You will see the New Member Sign Up page. 3. Enter your [facility] Access Code exactly as it appears below. You will not need to use this code after youve completed the sign-up process. If you do not sign up before the expiration date, you must request a new code. · [facility] [employer] Access Code: [email or web address] Expires: 3/8/2018  9:42 AM 
 
4. Enter the last four digits of your Social Security Number (xxxx) and Date of Birth (mm/dd/yyyy) as indicated and click Submit. You will be taken to the next sign-up page. 5. Create a PharmMD[employer] ID. This will be your [facility] login ID and cannot be changed, so think of one that is secure and easy to remember. 6. Create a [facility] password. You can change your password at any time. 7. Enter your Password Reset Question and Answer. This can be used at a later time if you forget your password. 8. Enter your e-mail address. You will receive e-mail notification when new information is available in [address] 9. Click Sign Up. You can now view and download portions of your medical record. 10. Click the Download Summary menu link to download a portable copy of your medical information. If you have questions, please visit the Frequently Asked Questions section of the [employer] website. Remember, [employer] is NOT to be used for urgent needs. For medical emergencies, dial 911. Now available from your iPhone and Android! Please provide this summary of care documentation to your next provider. Your primary care clinician is listed as Arleth Bentley. If you have any questions after today's visit, please call [phone].

## 2018-02-09 NOTE — PROGRESS NOTES
Jessica Haskins is a 80 y.o. female who presents for followup. In with daughter. Seen in November. No weight loss since December. Per daughter, she is eating better. Eating small amounts at a time. Per daughter she does well with oatmeal and applesauce. She is not taking ensure. No abdominal pain. No nausea. No diarrhea. Treated for DM. Following diabetic diet. On metformin daily. Up to date on eye exam.      Taking HTN medications, BP controlled. No dizziness. No headaches. Elevated creatinine 1.54 in December, maxide was held. BP is okay. Denies. swelling. Does not drink water. No NSAIDS.      Has memory loss, per daughter worse over 2 months. Prior namenda, not tolerated, too sedated. She was given Aricept, family stopped it since they did not see any difference. No side effects.       Taking statin daily, no side effects. On low fat diet. To get eye exam next week, glaucoma and cataracts. Past Medical History:   Diagnosis Date    DM Renal Manif Type II 2/11/2010    GERD (gastroesophageal reflux disease) 2/11/2010    Glaucoma, open angle 10/29/2010    Headache     HTN (hypertension) 2/11/2010    Hypercholesterolemia     Mixed hyperlipidemia 2/11/2010    Type II or unspecified type diabetes mellitus with renal manifestations, not stated as uncontrolled(250.40) ([facility] [address] 2/11/2010       Family History   Problem Relation Age of Onset    Cancer Mother      lung    Hypertension Mother     Cancer Brother      stomach    Hypertension Brother     Other Father      accidental death when pt was a little girl    Diabetes Brother        Social History     Social History    Marital status:      Spouse name: N/A    Number of children: N/A    Years of education: N/A     Occupational History    Not on file.      Social History Main Topics    Smoking status: Never Smoker    Smokeless tobacco: Never Used    Alcohol use No    Drug use: No    Sexual activity: No     Other Topics Concern    Not on file     Social History Narrative       Current Outpatient Prescriptions on File Prior to Visit   Medication Sig Dispense Refill    latanoprost (XALATAN) 0.005 % ophthalmic solution       pravastatin (PRAVACHOL) 40 mg tablet take 1 tablet by mouth NIGHTLY 90 Tab 2    verapamil ER (CALAN-SR) 240 mg CR tablet take 1 tablet by mouth once daily 90 Tab 2    metFORMIN (GLUCOPHAGE) 1,000 mg tablet take 1 tablet by mouth twice a day with meals 180 Tab 1    triamterene-hydroCHLOROthiazide (DYAZIDE) 37.5-25 mg per capsule take 1 capsule by mouth once daily if needed for SWELLING 30 Cap 2    aspirin delayed-release 81 mg tablet Take 81 mg by mouth daily. Taking every other day  Indications: myocardial infarction prevention      acetaminophen (TYLENOL) 325 mg tablet Take 325-650 mg by mouth every six (6) hours as needed for Pain. Indications: HEADACHE DISORDER      donepezil (ARICEPT) 5 mg tablet Take 1 Tab by mouth nightly. 30 Tab 5     No current facility-administered medications on file prior to visit. Review of Systems  Pertinent items are noted in HPI. Objective:     Visit Vitals    /82 (BP 1 Location: Left arm, BP Patient Position: Sitting)    Pulse 88    Temp 98.3 °F (36.8 °C) (Oral)    Resp 16    Ht 5' 6\" (1.676 m)    Wt 187 lb 12.8 oz (85.2 kg)    SpO2 99%    BMI 30.31 kg/m2     Gen: well appearing elderly female  HEENT:   PERRL,normal conjunctiva. External ear and canals normal, TMs no opacification or erythema,  OP no erythema, no exudates, MMM  Neck:  Supple. Thyroid normal size, nontender, without nodules. No masses or LAD  Resp:  No wheezing, no rhonchi, no rales. CV:  RRR, normal S7I3, 3/6 systolic murmur. GI: soft, nontender, without masses. .   Extrem:  +2 pulses, no edema, warm distally  Neuro: cognitive deficits      Assessment/Plan:     1. Elevated TSH    - TSH 3RD GENERATION    2.  Controlled type 2 diabetes mellitus without complication, without long-term current use of insulin ([facility]- Recommend compliance with diabetic diet. Continue medications for diabetes. Keep up on regular diabetic eye exams. 3. Mixed hyperlipidemia- Recommend AHA diet. Continue statin therapy. 4. Alzheimer's dementia without behavioral disturbance, unspecified timing of dementia onset- family decided to stop medication. 5. Essential hypertension, benign- Recommend following a lower sodium diet and stay active. Blood pressure goal is less than 130/85 on average. Advised compliance with blood pressure medication and regular follow up. 6. Renal insufficiency    - METABOLIC PANEL, BASIC    7. Medicare annual wellness visit, subsequent- REVIEWED MEDICARE WELLNESS    Follow-up Disposition: follow up pending labs and in 3 months.       Constantin Fung MD

## 2018-02-09 NOTE — PROGRESS NOTES
.      This is a Subsequent Medicare Annual Wellness Exam (AWV) (Performed 12 months after IPPE or effective date of Medicare Part B enrollment)    I have reviewed the patient's medical history in detail and updated the computerized patient record. History     Past Medical History:   Diagnosis Date    DM Renal Manif Type II 2/11/2010    GERD (gastroesophageal reflux disease) 2/11/2010    Glaucoma, open angle 10/29/2010    Headache     HTN (hypertension) 2/11/2010    Hypercholesterolemia     Mixed hyperlipidemia 2/11/2010    Type II or unspecified type diabetes mellitus with renal manifestations, not stated as uncontrolled(250.40) ([facility] [address] [facility] 2/11/2010      Past Surgical History:   Procedure Laterality Date    HX COLONOSCOPY  10/5/2005    tics and int. hem; Dr. Josie Fang; 10 year repeat    HX LARISSA AND BSO      AL EGD TRANSORAL BIOPSY SINGLE/MULTIPLE  7/1/2013          Current Outpatient Prescriptions   Medication Sig Dispense Refill    latanoprost (XALATAN) 0.005 % ophthalmic solution       pravastatin (PRAVACHOL) 40 mg tablet take 1 tablet by mouth NIGHTLY 90 Tab 2    verapamil ER (CALAN-SR) 240 mg CR tablet take 1 tablet by mouth once daily 90 Tab 2    metFORMIN (GLUCOPHAGE) 1,000 mg tablet take 1 tablet by mouth twice a day with meals 180 Tab 1    triamterene-hydroCHLOROthiazide (DYAZIDE) 37.5-25 mg per capsule take 1 capsule by mouth once daily if needed for SWELLING 30 Cap 2    aspirin delayed-release 81 mg tablet Take 81 mg by mouth daily. Taking every other day  Indications: myocardial infarction prevention      acetaminophen (TYLENOL) 325 mg tablet Take 325-650 mg by mouth every six (6) hours as needed for Pain. Indications: HEADACHE DISORDER      donepezil (ARICEPT) 5 mg tablet Take 1 Tab by mouth nightly.  30 Tab 5     Allergies   Allergen Reactions    Ace Inhibitors Cough    Codeine Nausea and Vomiting     Family History   Problem Relation Age of Onset    Cancer Mother      lung   [facility] Hypertension Mother     Cancer Brother      stomach    Hypertension Brother     Other Father      accidental death when pt was a little girl    Diabetes Brother      Social History   Substance Use Topics    Smoking status: Never Smoker    Smokeless tobacco: Never Used    Alcohol use No     Patient Active Problem List   Diagnosis Code    Diabetes mellitus type 2, controlled, without complications ([facility] [address] [facility] A76.2    Mixed hyperlipidemia E78.2    GERD (gastroesophageal reflux disease) K21.9    Allergic rhinitis J30.9    Glaucoma, open angle H40.10X0    Alzheimer's dementia G30.9    Edema of both legs R60.0    Unintentional weight loss F83.7    Systolic murmur H74.7    Essential hypertension, benign I10    ACE inhibitor intolerance Z78.9    Dementia without behavioral disturbance F03.90    Vitamin D deficiency E55.9    Type 2 diabetes mellitus with nephropathy (HCC) E11.21    Renal insufficiency N28.9       Depression Risk Factor Screening:     PHQ over the last two weeks 2/9/2018   Little interest or pleasure in doing things Not at all   Feeling down, depressed or hopeless Not at all   Total Score PHQ 2 0     Alcohol Risk Factor Screening: You do not drink alcohol or very rarely. Functional Ability and Level of Safety:   Hearing Loss  Hearing is good. Activities of Daily Living  The home contains: no safety equipment. using basin, not showering. Patient does total self care     Fall Risk  Fall Risk Assessment, last 12 mths 2/9/2018   Able to walk? Yes   Fall in past 12 months?  No       Abuse Screen  Patient is not abused    Cognitive Screening   Evaluation of Cognitive Function:  Has your family/caregiver stated any concerns about your memory: yes  dementia    Patient Care Team   Patient Care Team:  Giuseppe Coon MD as PCP - General (Internal Medicine)  Jm Arias RN as Ambulatory Care Navigator  Siddharth Elmore MD (Ophthalmology)  Trenton Ramey DPM (Podiatry)  Chloé Isabel Magali Mathur MD (Gastroenterology)  Felicia Tellez MD (Urology)    Assessment/Plan   Education and counseling provided:  Are appropriate based on today's review and evaluation    Diagnoses and all orders for this visit:    1. Elevated TSH  -     TSH 3RD GENERATION    2. Controlled type 2 diabetes mellitus without complication, without long-term current use of insulin ([facility]    3. Mixed hyperlipidemia    4. Alzheimer's dementia without behavioral disturbance, unspecified timing of dementia onset    5. Essential hypertension, benign    6. Renal insufficiency  -     METABOLIC PANEL, BASIC    7.  Medicare annual wellness visit, subsequent        Health Maintenance Due   Topic Date Due    DTaP/Tdap/Td series (1 - Tdap) 01/16/1953    ZOSTER VACCINE AGE 60>  11/16/1991    Pneumococcal 65+ Low/Medium Risk (2 of 2 - PCV13) 07/08/2014    FOOT EXAM Q1  06/10/2016    MICROALBUMIN Q1  11/11/2016    MEDICARE YEARLY EXAM  12/09/2017

## 2018-02-09 NOTE — LETTER
2/21/2018 8:41 AM 
 
Ms. Alirio Newman [address] [place] [address] [phone] Dear Alirio Newman: 
 
Please find your most recent results below. Resulted Orders METABOLIC PANEL, BASIC Result Value Ref Range Glucose 86 65 - 99 mg/dL BUN 9 8 - 27 mg/dL Creatinine 1.25 (H) 0.57 - 1.00 mg/dL GFR est non-AA 39 (L) >59 mL/min/1.73 GFR est AA 45 (L) >59 mL/min/1.73  
 BUN/Creatinine ratio 7 (L) 12 - 28 Sodium 144 134 - 144 mmol/L Potassium 4.3 3.5 - 5.2 mmol/L Chloride 104 96 - 106 mmol/L  
 CO2 22 18 - 29 mmol/L Calcium 8.8 8.7 - 10.3 mg/dL Narrative Performed at:  [address]  [id number] : Elvia Vaca MD, Phone:  [phone] TSH 3RD GENERATION Result Value Ref Range TSH 3.580 0.450 - 4.500 uIU/mL Narrative Performed at:  [address]  [id number] : Elvia Vaca MD, Phone:  [phone] RECOMMENDATIONS: 
Kidney function mildly impaired, but stable. Thyroid at goal. Follow up in 6 months Please call me if you have any questions: [phone] Sincerely, 
 
 
Tamie Gibbons MD

## 2018-02-09 NOTE — PATIENT INSTRUCTIONS

## 2018-02-09 NOTE — PROGRESS NOTES
Chief Complaint   Patient presents with    Medication Evaluation     Pt fasting     Visit Vitals    /82 (BP 1 Location: Left arm, BP Patient Position: Sitting)    Pulse 88    Temp 98.3 °F (36.8 °C) (Oral)    Resp 16    Ht 5' 6\" (1.676 m)    Wt 187 lb 12.8 oz (85.2 kg)    SpO2 99%    BMI 30.31 kg/m2     Reviewed record In preparation for visit and have obtained necessary documentation    1. Have you been to the ER, urgent care clinic since your last visit? Hospitalized since your last visit? NO    2. Have you seen or consulted any other health care providers outside of the [address] [place] [address] since your last visit? Include any pap smears or colon screening. NO    Patient does not have advance directive on file and has received paperwork.

## 2018-02-10 LAB
BUN SERPL-MCNC: 9 MG/DL (ref 8–27)
BUN/CREAT SERPL: 7 (ref 12–28)
CALCIUM SERPL-MCNC: 8.8 MG/DL (ref 8.7–10.3)
CHLORIDE SERPL-SCNC: 104 MMOL/L (ref 96–106)
CO2 SERPL-SCNC: 22 MMOL/L (ref 18–29)
CREAT SERPL-MCNC: 1.25 MG/DL (ref 0.57–1)
GFR SERPLBLD CREATININE-BSD FMLA CKD-EPI: 39 ML/MIN/1.73
GFR SERPLBLD CREATININE-BSD FMLA CKD-EPI: 45 ML/MIN/1.73
GLUCOSE SERPL-MCNC: 86 MG/DL (ref 65–99)
POTASSIUM SERPL-SCNC: 4.3 MMOL/L (ref 3.5–5.2)
SODIUM SERPL-SCNC: 144 MMOL/L (ref 134–144)
TSH SERPL DL<=0.005 MIU/L-ACNC: 3.58 UIU/ML (ref 0.45–4.5)

## 2018-02-19 NOTE — PROGRESS NOTES
Notify patient's family. Kidney function mildly impaired, but stable. Thyroid at goal. Follow up in 6 months.

## 2018-02-28 RX ORDER — METFORMIN HYDROCHLORIDE EXTENDED-RELEASE TABLETS 1000 MG/1
1 TABLET, FILM COATED, EXTENDED RELEASE ORAL 2 TIMES DAILY
Qty: 180 TAB | Refills: 2 | Status: SHIPPED | OUTPATIENT
Start: 2018-02-28 | End: 2018-05-10 | Stop reason: ALTCHOICE

## 2018-02-28 NOTE — TELEPHONE ENCOUNTER
Patient daughter reports to the pharmacy that the patient is experiencing GI side effects. The pharmacist recommended Fortamet instead of Glucophage. The copay for the patient is the same per [facility].      Last Refill: 11/29/2017    Last Office Visit: 02/09/2018    Upcoming Appointment: 05/10/2018

## 2018-04-30 RX ORDER — METFORMIN HYDROCHLORIDE 1000 MG/1
TABLET ORAL
Qty: 180 TAB | Refills: 1 | Status: SHIPPED | OUTPATIENT
Start: 2018-04-30 | End: 2018-05-10 | Stop reason: ALTCHOICE

## 2018-04-30 NOTE — TELEPHONE ENCOUNTER
PCP: Paul Reid MD    Last appt: 2/9/2018  Future Appointments  Date Time Provider Julián Lord   5/10/2018 11:30 AM Paul Reid MD [facility]       Requested Prescriptions     Pending Prescriptions Disp Refills    metFORMIN (GLUCOPHAGE) 1,000 mg tablet 180 Tab 1     Sig: take 1 tablet by mouth twice a day with meals

## 2018-04-30 NOTE — TELEPHONE ENCOUNTER
Pt is completely out of medication and daughter is asking if this can be filled yet today? They do apologize and wants you to know she has no medication for today.    90 day supply please

## 2018-05-10 ENCOUNTER — OFFICE VISIT (OUTPATIENT)
Dept: INTERNAL MEDICINE CLINIC | Age: 83
End: 2018-05-10

## 2018-05-10 VITALS
WEIGHT: 186.4 LBS | TEMPERATURE: 98.8 F | HEIGHT: 66 IN | RESPIRATION RATE: 16 BRPM | BODY MASS INDEX: 29.96 KG/M2 | SYSTOLIC BLOOD PRESSURE: 134 MMHG | OXYGEN SATURATION: 98 % | DIASTOLIC BLOOD PRESSURE: 75 MMHG | HEART RATE: 79 BPM

## 2018-05-10 DIAGNOSIS — F02.80 ALZHEIMER'S DEMENTIA WITHOUT BEHAVIORAL DISTURBANCE, UNSPECIFIED TIMING OF DEMENTIA ONSET: Chronic | ICD-10-CM

## 2018-05-10 DIAGNOSIS — H40.10X0 OPEN-ANGLE GLAUCOMA, UNSPECIFIED GLAUCOMA STAGE, UNSPECIFIED LATERALITY, UNSPECIFIED OPEN-ANGLE GLAUCOMA TYPE: ICD-10-CM

## 2018-05-10 DIAGNOSIS — E78.2 MIXED HYPERLIPIDEMIA: Chronic | ICD-10-CM

## 2018-05-10 DIAGNOSIS — E11.9 CONTROLLED TYPE 2 DIABETES MELLITUS WITHOUT COMPLICATION, WITHOUT LONG-TERM CURRENT USE OF INSULIN (HCC): Primary | Chronic | ICD-10-CM

## 2018-05-10 DIAGNOSIS — I10 ESSENTIAL HYPERTENSION, BENIGN: Chronic | ICD-10-CM

## 2018-05-10 DIAGNOSIS — G30.9 ALZHEIMER'S DEMENTIA WITHOUT BEHAVIORAL DISTURBANCE, UNSPECIFIED TIMING OF DEMENTIA ONSET: Chronic | ICD-10-CM

## 2018-05-10 RX ORDER — METFORMIN HYDROCHLORIDE EXTENDED-RELEASE TABLETS 1000 MG/1
1 TABLET, FILM COATED, EXTENDED RELEASE ORAL
Qty: 90 TAB | Refills: 3 | Status: SHIPPED | OUTPATIENT
Start: 2018-05-10 | End: 2019-02-14 | Stop reason: SDUPTHER

## 2018-05-10 RX ORDER — TRIAMTERENE AND HYDROCHLOROTHIAZIDE 37.5; 25 MG/1; MG/1
CAPSULE ORAL
COMMUNITY
End: 2018-12-28

## 2018-05-10 NOTE — PROGRESS NOTES
Ramana Chau is a 80 y.o. female who presents for follow up on diabetes. February 2018. In with daughter. Mild renal insufficiency. Stable in February. Family encouraging water intake. No NSAIDS. No weight loss since December. Eating small amounts at a time. Per daughter, the family has to encourage eating. No nausea. Normal BM.       Treated for DM. Following diabetic diet.  On metformin daily.  Up to date on eye exam. did better on fortamet per daughter. Has been o BID. HbA1C 6.0% in February.        Taking HTN medications, BP controlled.  No dizziness. No headaches.       Has memory loss. Prior namenda, not tolerated, too sedated.  No change on aricept and family stopped it.        Taking statin daily, no side effects.  On low fat diet.      Up to date on eye exam.  Has glaucoma and cataracts. Past Medical History:   Diagnosis Date    DM Renal Manif Type II 2/11/2010    GERD (gastroesophageal reflux disease) 2/11/2010    Glaucoma, open angle 10/29/2010    Headache     HTN (hypertension) 2/11/2010    Hypercholesterolemia     Mixed hyperlipidemia 2/11/2010    Type II or unspecified type diabetes mellitus with renal manifestations, not stated as uncontrolled(250.40) ([facility] [address] 2/11/2010       Family History   Problem Relation Age of Onset    Cancer Mother      lung    Hypertension Mother     Cancer Brother      stomach    Hypertension Brother     Other Father      accidental death when pt was a little girl    Diabetes Brother        Social History     Social History    Marital status:      Spouse name: N/A    Number of children: N/A    Years of education: N/A     Occupational History    Not on file.      Social History Main Topics    Smoking status: Never Smoker    Smokeless tobacco: Never Used    Alcohol use No    Drug use: No    Sexual activity: No     Other Topics Concern    Not on file     Social History Narrative       Current Outpatient Prescriptions on File Prior to Visit   Medication Sig Dispense Refill    latanoprost (XALATAN) 0.005 % ophthalmic solution       pravastatin (PRAVACHOL) 40 mg tablet take 1 tablet by mouth NIGHTLY 90 Tab 2    verapamil ER (CALAN-SR) 240 mg CR tablet take 1 tablet by mouth once daily 90 Tab 2    aspirin delayed-release 81 mg tablet Take 81 mg by mouth daily. Taking every other day  Indications: myocardial infarction prevention      acetaminophen (TYLENOL) 325 mg tablet Take 325-650 mg by mouth every six (6) hours as needed for Pain. Indications: HEADACHE DISORDER       No current facility-administered medications on file prior to visit. Review of Systems  Pertinent items are noted in HPI. Objective:     Visit Vitals    /75 (BP 1 Location: Left arm, BP Patient Position: Sitting)    Pulse 79    Temp 98.8 °F (37.1 °C) (Oral)    Resp 16    Ht 5' 6\" (1.676 m)    Wt 186 lb 6.4 oz (84.6 kg)    SpO2 98%    BMI 30.09 kg/m2     Gen: well appearing female  HEENT:   PERRL,normal conjunctiva. External ear and canals normal, TMs no opacification or erythema,  OP no erythema, no exudates, MMM  Neck:  Supple. Thyroid normal size, nontender, without nodules. No masses or LAD  Resp:  No wheezing, no rhonchi, no rales. CV:  RRR, normal S1S2, no murmur. GI: soft, nontender, without masses. No hepatosplenomegaly. Extrem:  +2 pulses, no edema, warm distally      Assessment/Plan:       ICD-10-CM ICD-9-CM    1. Controlled type 2 diabetes mellitus without complication, without long-term current use of insulin (HCC) E11.9 250.00 metFORMIN ER (FORTAMET) 1,000 mg tr24       DIABETES FOOT EXAM   2. Mixed hyperlipidemia E78.2 272.2    3. Essential hypertension, benign I10 401.1    4. Alzheimer's dementia without behavioral disturbance, unspecified timing of dementia onset G30.9 331.0     F02.80 294.10    5.  Open-angle glaucoma, unspecified glaucoma stage, unspecified laterality, unspecified open-angle glaucoma type H40.10X0 365.10 365.70    REDUCE THE FORTAMET ER TO ONCE A DAY WITH FOOD. Follow-up Disposition:  Return in about 3 months (around 8/10/2018) for FOLLOW UP WITH FASTING LABS ON RETURN.   Angel Montemayor MD

## 2018-05-10 NOTE — MR AVS SNAPSHOT
[address] [facility] 
[phone] Patient: Corinne Gregorio MRN:  [id number] Visit Information Date & Time Provider Department Dept. Phone Encounter #  
 5/10/2018 11:30 AM Royal Alvarez, [address] [facility] [phone] [id number] Follow-up Instructions Return in about 3 months (around 8/10/2018) for FOLLOW UP WITH FASTING LABS ON RETURN. Gurdeep Caban Upcoming Health Maintenance Date Due DTaP/Tdap/Td series (1 - Tdap) 1/16/1953 ZOSTER VACCINE AGE 60> 11/16/1991 Pneumococcal 65+ Low/Medium Risk (2 of 2 - PCV13) 7/8/2014 FOOT EXAM Q1 6/10/2016 MICROALBUMIN Q1 11/11/2016 HEMOGLOBIN A1C Q6M 6/8/2018 Bone Densitometry (Dexa) Screening 6/13/2020* Influenza Age 5 to Adult 8/1/2018 LIPID PANEL Q1 9/8/2018 EYE EXAM RETINAL OR DILATED Q1 11/21/2018 MEDICARE YEARLY EXAM 2/10/2019 *Topic was postponed. The date shown is not the original due date. Allergies as of 5/10/2018  Review Complete On: 5/10/2018 By: Royal Antonio MD  
  
 Severity Noted Reaction Type Reactions Ace Inhibitors  06/11/2010   Side Effect Cough Codeine  03/02/2010   Intolerance Nausea and Vomiting Current Immunizations  Reviewed on 12/8/2017 Name Date Influenza High Dose Vaccine PF 12/8/2017, 12/8/2016 Influenza Vaccine 11/19/2014, 11/18/2013 Influenza Vaccine (Quad) PF 11/11/2015 Influenza Vaccine Split 11/19/2012  2:48 PM, 9/27/2011, 10/27/2010 Influenza Vaccine Whole 10/5/2009 Pneumococcal Polysaccharide (PPSV-23) 7/8/2013 Not reviewed this visit You Were Diagnosed With   
  
 Codes Comments Controlled type 2 diabetes mellitus without complication, without long-term current use of insulin ([facility]    -  Primary ICD-10-CM: E11.9 ICD-9-CM: 250.00 Mixed hyperlipidemia     ICD-10-CM: E78.2 ICD-9-CM: 272.2 Essential hypertension, benign     ICD-10-CM: I10 
ICD-9-CM: 401.1 Alzheimer's dementia without behavioral disturbance, unspecified timing of dementia onset     ICD-10-CM: G30.9, F02.80 ICD-9-CM: 331.0, 294.10 Open-angle glaucoma, unspecified glaucoma stage, unspecified laterality, unspecified open-angle glaucoma type     ICD-10-CM: H40.10X0 ICD-9-CM: 365.10, 365.70 Vitals BP Pulse Temp Resp Height(growth percentile) Weight(growth percentile) 134/75 (BP 1 Location: Left arm, BP Patient Position: Sitting) 79 98.8 °F (37.1 °C) (Oral) 16 5' 6\" (1.676 m) 186 lb 6.4 oz (84.6 kg) SpO2 BMI OB Status Smoking Status 98% 30.09 kg/m2 Hysterectomy Never Smoker Vitals History BMI and BSA Data Body Mass Index Body Surface Area 30.09 kg/m 2 1.98 m 2 Preferred Pharmacy Pharmacy Name Phone [address], [address] [phone] Your Updated Medication List  
  
   
This list is accurate as of 5/10/18 12:13 PM.  Always use your most recent med list.  
  
  
  
  
 aspirin delayed-release 81 mg tablet Take 81 mg by mouth daily. Taking every other day  Indications: myocardial infarction prevention  
  
 latanoprost 0.005 % ophthalmic solution Commonly known as:  XALATAN  
  
 metFORMIN ER 1,000 mg Tr24 Commonly known as:  [employer]epsi[employer] Take 1 Tab by mouth once over twenty-four (24) hours. pravastatin 40 mg tablet Commonly known as:  PRAVACHOL  
take 1 tablet by mouth NIGHTLY  
  
 triamterene-hydroCHLOROthiazide 37.5-25 mg per capsule Commonly known as:  DYAZIDE  
triamterene 37.5 mg-hydrochlorothiazide 25 mg capsule TYLENOL 325 mg tablet Generic drug:  acetaminophen Take 325-650 mg by mouth every six (6) hours as needed for Pain. Indications: HEADACHE DISORDER  
  
 verapamil  mg CR tablet Commonly known as:  CALAN-SR  
take 1 tablet by mouth once daily Prescriptions Sent to Pharmacy Refills  
 metFORMIN ER (FORTAMET) 1,000 mg tr24 3 Sig: Take 1 Tab by mouth once over twenty-four (24) hours. Class: Normal  
 Pharmacy: [facility]  #: [phone] Route: Oral  
  
Follow-up Instructions Return in about 3 months (around 8/10/2018) for FOLLOW UP WITH FASTING LABS ON RETURN. .  
  
  
Patient Instructions REDUCE THE FORTAMET ER TO ONCE A DAY WITH FOOD. Introducing [facility]! Rajani Leos introduces [employer] patient portal. Now you can access parts of your medical record, email your doctor's office, and request medication refills online. 1. In your internet browser, go to [email or web address] 2. Click on the First Time User? Click Here link in the Sign In box. You will see the New Member Sign Up page. 3. Enter your [employer] Access Code exactly as it appears below. You will not need to use this code after youve completed the sign-up process. If you do not sign up before the expiration date, you must request a new code. · [employer] Access Code: [email or web address] Expires: 8/8/2018 12:13 PM 
 
4. Enter the last four digits of your Social Security Number (xxxx) and Date of Birth (mm/dd/yyyy) as indicated and click Submit. You will be taken to the next sign-up page. 5. Create a Bookacoach ID. This will be your Book[employer]ach login ID and cannot be changed, so think of one that is secure and easy to remember. 6. Create a Book[employer]ach password. You can change your password at any time. 7. Enter your Password Reset Question and Answer. This can be used at a later time if you forget your password. 8. Enter your e-mail address. You will receive e-mail notification when new information is available in [address] 9. Click Sign Up. You can now view and download portions of your medical record.  
10. Click the Download Summary menu link to download a portable copy of your medical information. If you have questions, please visit the Frequently Asked Questions section of the [employer] website. Remember, [employer] is NOT to be used for urgent needs. For medical emergencies, dial 911. Now available from your iPhone and Android! Please provide this summary of care documentation to your next provider. Your primary care clinician is listed as Keny Corcoran. If you have any questions after today's visit, please call [phone].

## 2018-05-10 NOTE — PROGRESS NOTES
Reviewed record in preparation for visit and have obtained necessary documentation. Identified pt with two pt identifiers(name and ). Chief Complaint   Patient presents with    Diabetes     3 month follow up    Medication Evaluation     pt fasting       Health Maintenance Due   Topic Date Due    DTaP/Tdap/Td  (1 - Tdap) 1953    Shingles Vaccine  1991    Pneumococcal Vaccine (2 of 2 - PCV13) 2014    Diabetic Foot Care  06/10/2016    Albumin Urine Test  2016    Hemoglobin A1C    2018       Ms. Rashid Lopez has a reminder for a \"due or due soon\" health maintenance. I have asked that she discuss this further with her primary care provider for follow-up on this health maintenance. Coordination of Care Questionnaire:  :     1) Have you been to an emergency room, urgent care clinic since your last visit? no   Hospitalized since your last visit? no             2) Have you seen or consulted any other health care providers outside of [address] [place] [address] since your last visit? no  (Include any pap smears or colon screenings in this section.)    3) In the event something were to happen to you and you were unable to speak on your behalf, do you have an Advance Directive/ Living Will in place stating your wishes? YES    Do you have an Advance Directive on file? no    4) Are you interested in receiving information on Advance Directives? NO    Patient is accompanied by spouse I have received verbal consent from Janine Mclaughlin to discuss any/all medical information while they are present in the room.

## 2018-06-28 ENCOUNTER — TELEPHONE (OUTPATIENT)
Dept: INTERNAL MEDICINE CLINIC | Age: 83
End: 2018-06-28

## 2018-06-28 NOTE — TELEPHONE ENCOUNTER
Darliss Schaumann, MD Gerlean Genet, CHAR        Caller: Unspecified (Today, 11:59 AM)                     appointment needed.       Left message for Esperanza Chau to return my call

## 2018-06-28 NOTE — TELEPHONE ENCOUNTER
Spoke to patients daughter Meseret Gee ([facility])  Two pt identifiers confirmed. Meseret Gee states that patient has been having some lower extremity edema and has been having some shortness of breath with exertion x 3 weeks  Zapata state that the swelling gets better when patient is off of her legs. Meseret Gee states that there is no redness and the patients legs do not appear to be weeping. Verified with Meseret Gee that patient is still taking her triamterene-hctz. Ashtyn Ruano that I will make Dr. Melva Puentes aware of what is going on and I will call her back with her recommendations. Lisette verbalized understanding of information discussed w/ no further questions at this time.

## 2018-06-28 NOTE — TELEPHONE ENCOUNTER
Pt's daughter Prosper Goldberg is returning a call     Best contact for Prosper Goldberg is [phone]       Message received & copied from [facility]

## 2018-06-28 NOTE — TELEPHONE ENCOUNTER
Patient's daughter, Jhoana Gonzalez states she needs a call back to get an appt asap for patient with leg swelling & periods of Shortness of Breath. Please nichole to discuss.  Thank you

## 2018-06-29 ENCOUNTER — OFFICE VISIT (OUTPATIENT)
Dept: INTERNAL MEDICINE CLINIC | Age: 83
End: 2018-06-29

## 2018-06-29 VITALS
HEIGHT: 66 IN | DIASTOLIC BLOOD PRESSURE: 74 MMHG | RESPIRATION RATE: 16 BRPM | BODY MASS INDEX: 31.57 KG/M2 | TEMPERATURE: 98 F | OXYGEN SATURATION: 98 % | HEART RATE: 76 BPM | SYSTOLIC BLOOD PRESSURE: 150 MMHG | WEIGHT: 196.4 LBS

## 2018-06-29 DIAGNOSIS — R60.9 EDEMA, UNSPECIFIED TYPE: Primary | ICD-10-CM

## 2018-06-29 DIAGNOSIS — I10 ESSENTIAL HYPERTENSION, BENIGN: Chronic | ICD-10-CM

## 2018-06-29 NOTE — MR AVS SNAPSHOT
[address] 
[phone] Patient: Radha Muñoz MRN:  [id number] Visit Information Date & Time Provider Department Dept. Phone Encounter #  
 6/29/2018  3:15 PM Reji Catalan, [address] [place] [address],[address] [phone] [id number] Follow-up Instructions Return if symptoms worsen or fail to improve. Your Appointments 8/14/2018  9:45 AM  
ROUTINE CARE with Reji Catalan MD  
[facility] [address] [facility]) Appt Note: 3 month follow up fasting labs [facility] [address] [id number]  
[address] [facility] [address] Upcoming Health Maintenance Date Due ZOSTER VACCINE AGE 60> 11/16/1991 Pneumococcal 65+ Low/Medium Risk (2 of 2 - PCV13) 7/8/2014 MICROALBUMIN Q1 11/11/2016 HEMOGLOBIN A1C Q6M 6/8/2018 DTaP/Tdap/Td series (1 - Tdap) 5/10/2019* Bone Densitometry (Dexa) Screening 6/13/2020* Influenza Age 5 to Adult 8/1/2018 LIPID PANEL Q1 9/8/2018 EYE EXAM RETINAL OR DILATED Q1 11/21/2018 MEDICARE YEARLY EXAM 2/10/2019 FOOT EXAM Q1 5/10/2019 *Topic was postponed. The date shown is not the original due date. Allergies as of 6/29/2018  Review Complete On: 6/29/2018 By: Reji Catalan MD  
  
 Severity Noted Reaction Type Reactions Ace Inhibitors  06/11/2010   Side Effect Cough Codeine  03/02/2010   Intolerance Nausea and Vomiting Current Immunizations  Reviewed on 5/10/2018 Name Date Influenza High Dose Vaccine PF 12/8/2017, 12/8/2016 Influenza Vaccine 11/19/2014, 11/18/2013 Influenza Vaccine (Quad) PF 11/11/2015 Influenza Vaccine Split 11/19/2012  2:48 PM, 9/27/2011, 10/27/2010 Influenza Vaccine Whole 10/5/2009 Pneumococcal Polysaccharide (PPSV-23) 7/8/2013 Not reviewed this visit You Were Diagnosed With   
  
 Codes Comments Edema, unspecified type    -  Primary ICD-10-CM: R60.9 ICD-9-CM: 782.3 Essential hypertension, benign     ICD-10-CM: I10 
ICD-9-CM: 401.1 Vitals BP Pulse Temp Resp Height(growth percentile) Weight(growth percentile) 150/74 (BP 1 Location: Left arm, BP Patient Position: Sitting) 76 98 °F (36.7 °C) (Oral) 16 5' 6\" (1.676 m) 196 lb 6.4 oz (89.1 kg) SpO2 BMI OB Status Smoking Status 98% 31.7 kg/m2 Hysterectomy Never Smoker BMI and BSA Data Body Mass Index Body Surface Area 31.7 kg/m 2 2.04 m 2 Preferred Pharmacy Pharmacy Name Phone [address], [address] [phone] Your Updated Medication List  
  
   
This list is accurate as of 6/29/18  4:09 PM.  Always use your most recent med list.  
  
  
  
  
 aspirin delayed-release 81 mg tablet Take 81 mg by mouth daily. Taking every other day  Indications: myocardial infarction prevention  
  
 latanoprost 0.005 % ophthalmic solution Commonly known as:  XALATAN  
  
 metFORMIN ER 1,000 mg Tr24 Commonly known as:  [employer]epsi[employer] Take 1 Tab by mouth once over twenty-four (24) hours. pravastatin 40 mg tablet Commonly known as:  PRAVACHOL  
take 1 tablet by mouth NIGHTLY  
  
 triamterene-hydroCHLOROthiazide 37.5-25 mg per capsule Commonly known as:  DYAZIDE  
triamterene 37.5 mg-hydrochlorothiazide 25 mg capsule TYLENOL 325 mg tablet Generic drug:  acetaminophen Take 325-650 mg by mouth every six (6) hours as needed for Pain. Indications: HEADACHE DISORDER  
  
 verapamil  mg CR tablet Commonly known as:  CALAN-SR  
take 1 tablet by mouth once daily Follow-up Instructions Return if symptoms worsen or fail to improve. Patient Instructions RESUME TRIAMTERENE/HCTZ 37.5/25MG ONE CAPSULE EVERY OTHER DAY. LOW SALT DIET. CHECK ANKLES FOR SWELLING. WEIGHT IN OFFICE 196#, # IN MAY. Low Sodium Diet (2,000 Milligram): Care Instructions Your Care Instructions Too much sodium causes your body to hold on to extra water. This can raise your blood pressure and force your heart and kidneys to work harder. In very serious cases, this could cause you to be put in the hospital. It might even be life-threatening. By limiting sodium, you will feel better and lower your risk of serious problems. The most common source of sodium is salt. People get most of the salt in their diet from canned, prepared, and packaged foods. Fast food and restaurant meals also are very high in sodium. Your doctor will probably limit your sodium to less than 2,000 milligrams (mg) a day. This limit counts all the sodium in prepared and packaged foods and any salt you add to your food. Follow-up care is a key part of your treatment and safety. Be sure to make and go to all appointments, and call your doctor if you are having problems. It's also a good idea to know your test results and keep a list of the medicines you take. How can you care for yourself at home? Read food labels · Read labels on cans and food packages. The labels tell you how much sodium is in each serving. Make sure that you look at the serving size. If you eat more than the serving size, you have eaten more sodium. · Food labels also tell you the Percent Daily Value for sodium. Choose products with low Percent Daily Values for sodium. · Be aware that sodium can come in forms other than salt, including monosodium glutamate (MSG), sodium citrate, and sodium bicarbonate (baking soda). MSG is often added to Asian food. When you eat out, you can sometimes ask for food without MSG or added salt. Buy low-sodium foods · Buy foods that are labeled \"unsalted\" (no salt added), \"sodium-free\" (less than 5 mg of sodium per serving), or \"low-sodium\" (less than 140 mg of sodium per serving). Foods labeled \"reduced-sodium\" and \"light sodium\" may still have too much sodium. Be sure to read the label to see how much sodium you are getting. · Buy fresh vegetables, or frozen vegetables without added sauces. Buy low-sodium versions of canned vegetables, soups, and other canned goods. Prepare low-sodium meals · Cut back on the amount of salt you use in cooking. This will help you adjust to the taste. Do not add salt after cooking. One teaspoon of salt has about 2,300 mg of sodium. · Take the salt shaker off the table. · Flavor your food with garlic, lemon juice, onion, vinegar, herbs, and spices. Do not use soy sauce, lite soy sauce, steak sauce, onion salt, garlic salt, celery salt, mustard, or ketchup on your food. · Use low-sodium salad dressings, sauces, and ketchup. Or make your own salad dressings and sauces without adding salt. · Use less salt (or none) when recipes call for it. You can often use half the salt a recipe calls for without losing flavor. Other foods such as rice, pasta, and grains do not need added salt. · Rinse canned vegetables, and cook them in fresh water. This removes some-but not all-of the salt. · Avoid water that is naturally high in sodium or that has been treated with water softeners, which add sodium. Call your local water company to find out the sodium content of your water supply. If you buy bottled water, read the label and choose a sodium-free brand. Avoid high-sodium foods · Avoid eating: ¨ Smoked, cured, salted, and canned meat, fish, and poultry. ¨ Ham, escamilla, hot dogs, and luncheon meats. ¨ Regular, hard, and processed cheese and regular peanut butter. ¨ Crackers with salted tops, and other salted snack foods such as pretzels, chips, and salted popcorn. ¨ Frozen prepared meals, unless labeled low-sodium. ¨ Canned and dried soups, broths, and bouillon, unless labeled sodium-free or low-sodium. ¨ Canned vegetables, unless labeled sodium-free or low-sodium. ¨ Western Ingrid fries, pizza, tacos, and other fast foods. ¨ Pickles, olives, ketchup, and other condiments, especially soy sauce, unless labeled sodium-free or low-sodium. Where can you learn more? Go to http://annelise-mirna.info/. Enter A128 in the search box to learn more about \"Low Sodium Diet (2,000 Milligram): Care Instructions. \" Current as of: May 12, 2017 Content Version: 11.4 © 7064-0259 [employer]. Care instructions adapted under license by [employer] (which disclaims liability or warranty for this information). If you have questions about a medical condition or this instruction, always ask your healthcare professional. [address] any warranty or liability for your use of this information. Introducing [facility]! [employer] introduces [employer] patient portal. Now you can access parts of your medical record, email your doctor's office, and request medication refills online. 1. In your internet browser, go to [email or web address] 2. Click on the First Time User? Click Here link in the Sign In box. You will see the New Member Sign Up page. 3. Enter your [employer] Access Code exactly as it appears below. You will not need to use this code after youve completed the sign-up process. If you do not sign up before the expiration date, you must request a new code. · [employer] Access Code: [email or web address] Expires: 8/8/2018 12:13 PM 
 
4. Enter the last four digits of your Social Security Number (xxxx) and Date of Birth (mm/dd/yyyy) as indicated and click Submit. You will be taken to the next sign-up page. 5. Create a Pint Pleaset ID. This will be your [employer]Solved login ID and cannot be changed, so think of one that is secure and easy to remember. 6. Create a Pint Pleaset password. You can change your password at any time. 7. Enter your Password Reset Question and Answer. This can be used at a later time if you forget your password. 8. Enter your e-mail address. You will receive e-mail notification when new information is available in [address] 9. Click Sign Up. You can now view and download portions of your medical record. 10. Click the Download Summary menu link to download a portable copy of your medical information. If you have questions, please visit the Frequently Asked Questions section of the BuzzD[employer] website. Remember, [employer] is NOT to be used for urgent needs. For medical emergencies, dial 911. Now available from your iPhone and Android! Please provide this summary of care documentation to your next provider. Your primary care clinician is listed as Waleska Diamond. If you have any questions after today's visit, please call [phone].

## 2018-06-29 NOTE — TELEPHONE ENCOUNTER
Spoke with patients daughter Krupa Mike ([facility])  Two pt identifiers confirmed. Arkoma notified that Dr. Nehal Cota would like to see the patient in the office to discuss her lower extremity edema and shortness of breath. Arkoma offered an appointment today 06/29/18 with Dr. Nehal Cota at 3:15pm  Krupa Rashid accepted. Lisette advised to call the office if anything changes or if they are unable to keep this appointment. Arkoma verbalized understanding of information discussed w/ no further questions at this time.

## 2018-06-29 NOTE — PATIENT INSTRUCTIONS
RESUME TRIAMTERENE/HCTZ 37.5/25MG ONE CAPSULE EVERY OTHER DAY. LOW SALT DIET. CHECK ANKLES FOR SWELLING.      WEIGHT IN OFFICE 196#, # IN MAY. Low Sodium Diet (2,000 Milligram): Care Instructions  Your Care Instructions    Too much sodium causes your body to hold on to extra water. This can raise your blood pressure and force your heart and kidneys to work harder. In very serious cases, this could cause you to be put in the hospital. It might even be life-threatening. By limiting sodium, you will feel better and lower your risk of serious problems. The most common source of sodium is salt. People get most of the salt in their diet from canned, prepared, and packaged foods. Fast food and restaurant meals also are very high in sodium. Your doctor will probably limit your sodium to less than 2,000 milligrams (mg) a day. This limit counts all the sodium in prepared and packaged foods and any salt you add to your food. Follow-up care is a key part of your treatment and safety. Be sure to make and go to all appointments, and call your doctor if you are having problems. It's also a good idea to know your test results and keep a list of the medicines you take. How can you care for yourself at home? Read food labels  · Read labels on cans and food packages. The labels tell you how much sodium is in each serving. Make sure that you look at the serving size. If you eat more than the serving size, you have eaten more sodium. · Food labels also tell you the Percent Daily Value for sodium. Choose products with low Percent Daily Values for sodium. · Be aware that sodium can come in forms other than salt, including monosodium glutamate (MSG), sodium citrate, and sodium bicarbonate (baking soda). MSG is often added to Asian food. When you eat out, you can sometimes ask for food without MSG or added salt.   Buy low-sodium foods  · Buy foods that are labeled \"unsalted\" (no salt added), \"sodium-free\" (less than 5 mg of sodium per serving), or \"low-sodium\" (less than 140 mg of sodium per serving). Foods labeled \"reduced-sodium\" and \"light sodium\" may still have too much sodium. Be sure to read the label to see how much sodium you are getting. · Buy fresh vegetables, or frozen vegetables without added sauces. Buy low-sodium versions of canned vegetables, soups, and other canned goods. Prepare low-sodium meals  · Cut back on the amount of salt you use in cooking. This will help you adjust to the taste. Do not add salt after cooking. One teaspoon of salt has about 2,300 mg of sodium. · Take the salt shaker off the table. · Flavor your food with garlic, lemon juice, onion, vinegar, herbs, and spices. Do not use soy sauce, lite soy sauce, steak sauce, onion salt, garlic salt, celery salt, mustard, or ketchup on your food. · Use low-sodium salad dressings, sauces, and ketchup. Or make your own salad dressings and sauces without adding salt. · Use less salt (or none) when recipes call for it. You can often use half the salt a recipe calls for without losing flavor. Other foods such as rice, pasta, and grains do not need added salt. · Rinse canned vegetables, and cook them in fresh water. This removes some-but not all-of the salt. · Avoid water that is naturally high in sodium or that has been treated with water softeners, which add sodium. Call your local water company to find out the sodium content of your water supply. If you buy bottled water, read the label and choose a sodium-free brand. Avoid high-sodium foods  · Avoid eating:  ¨ Smoked, cured, salted, and canned meat, fish, and poultry. ¨ Ham, escamilla, hot dogs, and luncheon meats. ¨ Regular, hard, and processed cheese and regular peanut butter. ¨ Crackers with salted tops, and other salted snack foods such as pretzels, chips, and salted popcorn. ¨ Frozen prepared meals, unless labeled low-sodium.   ¨ Canned and dried soups, broths, and bouillon, unless labeled sodium-free or low-sodium. ¨ Canned vegetables, unless labeled sodium-free or low-sodium. ¨ Western Ingrid fries, pizza, tacos, and other fast foods. ¨ Pickles, olives, ketchup, and other condiments, especially soy sauce, unless labeled sodium-free or low-sodium. Where can you learn more? Go to http://annelise-mirna.info/. Enter P962 in the search box to learn more about \"Low Sodium Diet (2,000 Milligram): Care Instructions. \"  Current as of: May 12, 2017  Content Version: 11.4  © 6458-5894 [employer]. Care instructions adapted under license by [employer] (which disclaims liability or warranty for this information). If you have questions about a medical condition or this instruction, always ask your healthcare professional. [address] any warranty or liability for your use of this information.

## 2018-06-29 NOTE — PROGRESS NOTES
Giovanna Houser is a 80 y.o. female who presents with daughter. Reports dry cough for 2 weeks. Per daughter, she has some drainage. Throat clearing. No URI sx. Some wheezing. SOB with exertion. Not worsening per patient. Per daughter, she is more SOB. Walking from car to house, she is out of breath and has to rest.  Denies orthopnea. No PND. Notes swelling in both legs, more than usual for past 4 weeks. Low salt diet. Has been off dyazide due to worsening renal function. Past Medical History:   Diagnosis Date    DM Renal Manif Type II 2/11/2010    GERD (gastroesophageal reflux disease) 2/11/2010    Glaucoma, open angle 10/29/2010    Headache     HTN (hypertension) 2/11/2010    Hypercholesterolemia     Mixed hyperlipidemia 2/11/2010    Type II or unspecified type diabetes mellitus with renal manifestations, not stated as uncontrolled(250.40) ([facility] [address] 2/11/2010       Family History   Problem Relation Age of Onset    Cancer Mother      lung    Hypertension Mother     Cancer Brother      stomach    Hypertension Brother     Other Father      accidental death when pt was a little girl    Diabetes Brother        Social History     Social History    Marital status:      Spouse name: N/A    Number of children: N/A    Years of education: N/A     Occupational History    Not on file. Social History Main Topics    Smoking status: Never Smoker    Smokeless tobacco: Never Used    Alcohol use No    Drug use: No    Sexual activity: No     Other Topics Concern    Not on file     Social History Narrative       Current Outpatient Prescriptions on File Prior to Visit   Medication Sig Dispense Refill    triamterene-hydroCHLOROthiazide (DYAZIDE) 37.5-25 mg per capsule triamterene 37.5 mg-hydrochlorothiazide 25 mg capsule      metFORMIN ER (FORTAMET) 1,000 mg tr24 Take 1 Tab by mouth once over twenty-four (24) hours.  90 Tab 3    latanoprost (XALATAN) 0.005 % ophthalmic solution  pravastatin (PRAVACHOL) 40 mg tablet take 1 tablet by mouth NIGHTLY 90 Tab 2    verapamil ER (CALAN-SR) 240 mg CR tablet take 1 tablet by mouth once daily 90 Tab 2    aspirin delayed-release 81 mg tablet Take 81 mg by mouth daily. Taking every other day  Indications: myocardial infarction prevention      acetaminophen (TYLENOL) 325 mg tablet Take 325-650 mg by mouth every six (6) hours as needed for Pain. Indications: HEADACHE DISORDER       No current facility-administered medications on file prior to visit. Review of Systems  Pertinent items are noted in HPI. Objective:     Visit Vitals    /74 (BP 1 Location: Left arm, BP Patient Position: Sitting)    Pulse 76    Temp 98 °F (36.7 °C) (Oral)    Resp 16    Ht 5' 6\" (1.676 m)    Wt 196 lb 6.4 oz (89.1 kg)    SpO2 98%    BMI 31.7 kg/m2     Gen: well appearing female  HEENT:   PERRL OP no erythema, no exudates, MMM  Neck:  No masses or LAD  Resp:  No wheezing, no rhonchi, no rales. CV:  RRR, normal S1S2, no murmur. GI: soft, nontender, without masses. Extrem:  +2 pulses, +1 pretibial bilateral edema, warm distally      Assessment/Plan:     1. Edema, unspecified type- Recommend low salt diet and elevate legs. Resume dyazide every other day. 2. Essential hypertension, benign- Recommend following a lower sodium diet and stay active. Blood pressure goal is less than 130/85 on average. Advised compliance with blood pressure medication and regular follow up. Follow-up Disposition: as scheduled.      Racquel Gee MD

## 2018-08-14 ENCOUNTER — OFFICE VISIT (OUTPATIENT)
Dept: INTERNAL MEDICINE CLINIC | Age: 83
End: 2018-08-14

## 2018-08-14 VITALS
OXYGEN SATURATION: 99 % | RESPIRATION RATE: 16 BRPM | WEIGHT: 186 LBS | BODY MASS INDEX: 29.89 KG/M2 | DIASTOLIC BLOOD PRESSURE: 67 MMHG | HEART RATE: 73 BPM | SYSTOLIC BLOOD PRESSURE: 136 MMHG | HEIGHT: 66 IN | TEMPERATURE: 97.9 F

## 2018-08-14 DIAGNOSIS — E78.2 MIXED HYPERLIPIDEMIA: Chronic | ICD-10-CM

## 2018-08-14 DIAGNOSIS — I10 ESSENTIAL HYPERTENSION, BENIGN: Chronic | ICD-10-CM

## 2018-08-14 DIAGNOSIS — E11.9 CONTROLLED TYPE 2 DIABETES MELLITUS WITHOUT COMPLICATION, WITHOUT LONG-TERM CURRENT USE OF INSULIN (HCC): Primary | Chronic | ICD-10-CM

## 2018-08-14 DIAGNOSIS — N28.9 RENAL INSUFFICIENCY: ICD-10-CM

## 2018-08-14 DIAGNOSIS — D64.9 ANEMIA, UNSPECIFIED TYPE: ICD-10-CM

## 2018-08-14 NOTE — PROGRESS NOTES
Reviewed record in preparation for visit and have obtained necessary documentation. Identified pt with two pt identifiers(name and ). Chief Complaint   Patient presents with    Hypertension     3 month follow up    Diabetes     3 month follow up    Medication Evaluation     Pt non fasting       Health Maintenance Due   Topic Date Due    Shingles Vaccine  1991    Pneumococcal Vaccine (2 of 2 - PCV13) 2014    Albumin Urine Test  2016    Hemoglobin A1C    2018    Flu Vaccine  2018    Cholesterol Test   2018       Ms. Anselmo Lu has a reminder for a \"due or due soon\" health maintenance. I have asked that she discuss this further with her primary care provider for follow-up on this health maintenance. Coordination of Care Questionnaire:  :     1) Have you been to an emergency room, urgent care clinic since your last visit? no   Hospitalized since your last visit? no             2) Have you seen or consulted any other health care providers outside of [address] [place] [address] since your last visit? no  (Include any pap smears or colon screenings in this section.)    3) In the event something were to happen to you and you were unable to speak on your behalf, do you have an Advance Directive/ Living Will in place stating your wishes? NO    Do you have an Advance Directive on file? no    4) Are you interested in receiving information on Advance Directives? NO    Patient is accompanied by daughter I have received verbal consent from Radha Muñoz to discuss any/all medical information while they are present in the room.

## 2018-08-14 NOTE — MR AVS SNAPSHOT
[facility] [address] [place] [facility] 
[phone] Patient: Radha Muñoz MRN:  [id number] Visit Information Date & Time Provider Department Dept. Phone Encounter #  
 8/14/2018  9:45 AM Reji Catalan, [address] [place] [address],[address] [phone] [id number] Follow-up Instructions Return in about 6 months (around 2/14/2019) for follow up and fasting labs. Sandrine Gearing Upcoming Health Maintenance Date Due ZOSTER VACCINE AGE 60> 11/16/1991 Pneumococcal 65+ Low/Medium Risk (2 of 2 - PCV13) 7/8/2014 Influenza Age 5 to Adult 8/1/2018 LIPID PANEL Q1 9/8/2018 DTaP/Tdap/Td series (1 - Tdap) 5/10/2019* Bone Densitometry (Dexa) Screening 6/13/2020* EYE EXAM RETINAL OR DILATED Q1 11/21/2018 MEDICARE YEARLY EXAM 2/10/2019 HEMOGLOBIN A1C Q6M 2/14/2019 FOOT EXAM Q1 5/10/2019 MICROALBUMIN Q1 8/14/2019 *Topic was postponed. The date shown is not the original due date. Allergies as of 8/14/2018  Review Complete On: 8/14/2018 By: Anamaria Roach LPN Severity Noted Reaction Type Reactions Ace Inhibitors  06/11/2010   Side Effect Cough Codeine  03/02/2010   Intolerance Nausea and Vomiting Current Immunizations  Reviewed on 5/10/2018 Name Date Influenza High Dose Vaccine PF 12/8/2017, 12/8/2016 Influenza Vaccine 11/19/2014, 11/18/2013 Influenza Vaccine (Quad) PF 11/11/2015 Influenza Vaccine Split 11/19/2012  2:48 PM, 9/27/2011, 10/27/2010 Influenza Vaccine Whole 10/5/2009 Pneumococcal Polysaccharide (PPSV-23) 7/8/2013 Not reviewed this visit You Were Diagnosed With   
  
 Codes Comments Controlled type 2 diabetes mellitus without complication, without long-term current use of insulin [facility]    -  Primary ICD-10-CM: E11.9 ICD-9-CM: 250.00 Essential hypertension, benign     ICD-10-CM: I10 
ICD-9-CM: 401.1 Mixed hyperlipidemia     ICD-10-CM: E78.2 ICD-9-CM: 272.2 Renal insufficiency     ICD-10-CM: N28.9 ICD-9-CM: 593.9 Anemia, unspecified type     ICD-10-CM: D64.9 ICD-9-CM: 698. 9 Vitals BP Pulse Temp Resp Height(growth percentile) Weight(growth percentile) 136/67 (BP 1 Location: Left arm, BP Patient Position: Sitting) 73 97.9 °F (36.6 °C) (Oral) 16 5' 6\" (1.676 m) 186 lb (84.4 kg) SpO2 BMI OB Status Smoking Status 99% 30.02 kg/m2 Hysterectomy Never Smoker BMI and BSA Data Body Mass Index Body Surface Area 30.02 kg/m 2 1.98 m 2 Preferred Pharmacy Pharmacy Name Phone [address], [address] [phone] Your Updated Medication List  
  
   
This list is accurate as of 8/14/18 10:47 AM.  Always use your most recent med list.  
  
  
  
  
 aspirin delayed-release 81 mg tablet Take 81 mg by mouth daily. Taking every other day  Indications: myocardial infarction prevention  
  
 latanoprost 0.005 % ophthalmic solution Commonly known as:  XALATAN  
  
 metFORMIN ER 1,000 mg Tr24 Commonly known as:  [employer] Take 1 Tab by mouth once over twenty-four (24) hours. pravastatin 40 mg tablet Commonly known as:  PRAVACHOL  
take 1 tablet by mouth NIGHTLY  
  
 triamterene-hydroCHLOROthiazide 37.5-25 mg per capsule Commonly known as:  DYAZIDE  
triamterene 37.5 mg-hydrochlorothiazide 25 mg capsule TYLENOL 325 mg tablet Generic drug:  acetaminophen Take 325-650 mg by mouth every six (6) hours as needed for Pain. Indications: HEADACHE DISORDER  
  
 verapamil  mg CR tablet Commonly known as:  CALAN-SR  
take 1 tablet by mouth once daily We Performed the Following CBC W/O DIFF [70667 CPT(R)] HEMOGLOBIN A1C W/O EAG [90524 CPT(R)] LIPID PANEL [20045 CPT(R)] METABOLIC PANEL, COMPREHENSIVE [59755 CPT(R)] MICROALBUMIN, UR, RAND W/ MICROALB/CREAT RATIO [id number] CPT(R)] Follow-up Instructions Return in about 6 months (around 2/14/2019) for follow up and fasting labs. .  
  
  
Introducing [facility] SERVICES! [employer] introduces [employer] patient portal. Now you can access parts of your medical record, email your doctor's office, and request medication refills online. 1. In your internet browser, go to [email or web address] 2. Click on the First Time User? Click Here link in the Sign In box. You will see the New Member Sign Up page. 3. Enter your [employer] Access Code exactly as it appears below. You will not need to use this code after youve completed the sign-up process. If you do not sign up before the expiration date, you must request a new code. · [employer] Access Code: [email or web address] Expires: 11/12/2018 10:47 AM 
 
4. Enter the last four digits of your Social Security Number (xxxx) and Date of Birth (mm/dd/yyyy) as indicated and click Submit. You will be taken to the next sign-up page. 5. Create a [employer] ID. This will be your [employer] login ID and cannot be changed, so think of one that is secure and easy to remember. 6. Create a [employer] password. You can change your password at any time. 7. Enter your Password Reset Question and Answer. This can be used at a later time if you forget your password. 8. Enter your e-mail address. You will receive e-mail notification when new information is available in [address] 9. Click Sign Up. You can now view and download portions of your medical record. 10. Click the Download Summary menu link to download a portable copy of your medical information. If you have questions, please visit the Frequently Asked Questions section of the [employer] website. Remember, [employer] is NOT to be used for urgent needs. For medical emergencies, dial 911. Now available from your iPhone and Android! Please provide this summary of care documentation to your next provider. Your primary care clinician is listed as Gurpreet Álvarez. If you have any questions after today's visit, please call [phone].

## 2018-08-14 NOTE — PROGRESS NOTES
Sandi Yao is a 80 y.o. female who presents for follow up in with daughter. Mild renal insufficiency. Stable in February. Family encouraging water intake. No NSAIDS.      Weight 196# in May, decreased 10#, is eating with encouragement. No nausea. Normal BM.  normal urination. No abdominal pain.       Treated for DM. Following diabetic diet.  On metformin daily.  Up to date on eye exam.  HbA1C 6.0% in February.        Taking HTN medications, BP controlled.  No dizziness. No headaches.       Has memory loss, not on treatment, gradually worsening. Prior namenda, not tolerated, too sedated.  No change on aricept and family stopped it. Per daughter, she is fearful and anxious. Never alone. Sleeping okay.        Taking statin daily, no side effects.  On low fat diet.       Up to date on eye exam.  Has glaucoma and cataracts.         Past Medical History:   Diagnosis Date    DM Renal Manif Type II 2/11/2010    GERD (gastroesophageal reflux disease) 2/11/2010    Glaucoma, open angle 10/29/2010    Headache     HTN (hypertension) 2/11/2010    Hypercholesterolemia     Mixed hyperlipidemia 2/11/2010    Type II or unspecified type diabetes mellitus with renal manifestations, not stated as uncontrolled(250.40) ([facility] [address] 2/11/2010       Family History   Problem Relation Age of Onset    Cancer Mother      lung    Hypertension Mother     Cancer Brother      stomach    Hypertension Brother     Other Father      accidental death when pt was a little girl    Diabetes Brother        Social History     Social History    Marital status:      Spouse name: N/A    Number of children: N/A    Years of education: N/A     Occupational History    Not on file.      Social History Main Topics    Smoking status: Never Smoker    Smokeless tobacco: Never Used    Alcohol use No    Drug use: No    Sexual activity: No     Other Topics Concern    Not on file     Social History Narrative       Current Outpatient Prescriptions on File Prior to Visit   Medication Sig Dispense Refill    triamterene-hydroCHLOROthiazide (DYAZIDE) 37.5-25 mg per capsule triamterene 37.5 mg-hydrochlorothiazide 25 mg capsule      metFORMIN ER (FORTAMET) 1,000 mg tr24 Take 1 Tab by mouth once over twenty-four (24) hours. 90 Tab 3    latanoprost (XALATAN) 0.005 % ophthalmic solution       pravastatin (PRAVACHOL) 40 mg tablet take 1 tablet by mouth NIGHTLY 90 Tab 2    verapamil ER (CALAN-SR) 240 mg CR tablet take 1 tablet by mouth once daily 90 Tab 2    aspirin delayed-release 81 mg tablet Take 81 mg by mouth daily. Taking every other day  Indications: myocardial infarction prevention      acetaminophen (TYLENOL) 325 mg tablet Take 325-650 mg by mouth every six (6) hours as needed for Pain. Indications: HEADACHE DISORDER       No current facility-administered medications on file prior to visit. Review of Systems  Pertinent items are noted in HPI. Objective:     Visit Vitals    /67 (BP 1 Location: Left arm, BP Patient Position: Sitting)    Pulse 73    Temp 97.9 °F (36.6 °C) (Oral)    Resp 16    Ht 5' 6\" (1.676 m)    Wt 186 lb (84.4 kg)    SpO2 99%    BMI 30.02 kg/m2     Gen: well appearing female  HEENT:   PERRL,normal conjunctiva. External ear and canals normal, TMs no opacification or erythema,  OP no erythema, no exudates, MMM  Neck:   No masses or LAD  Resp:  No wheezing, no rhonchi, no rales. CV:  RRR, normal S1S2, no murmur. GI: soft, nontender, without masses. Extrem:  +2 pulses, no edema, warm distally      Assessment/Plan:       ICD-10-CM ICD-9-CM    1. Controlled type 2 diabetes mellitus without complication, without long-term current use of insulin (HCC) E11.9 250.00 HEMOGLOBIN A1C W/O EAG      MICROALBUMIN, UR, RAND W/ MICROALB/CREAT RATIO   2. Essential hypertension, benign Q24 740.7 METABOLIC PANEL, COMPREHENSIVE      CBC W/O DIFF   3.  Mixed hyperlipidemia M75.9 727.3 METABOLIC PANEL, COMPREHENSIVE LIPID PANEL   4. Renal insufficiency P10.1 425.5 METABOLIC PANEL, COMPREHENSIVE   5. Anemia, unspecified type D64.9 285.9 CBC W/O DIFF       Follow-up Disposition:  Return in about 6 months (around 2/14/2019) for follow up and fasting labs.   Rosa Davis MD

## 2018-08-15 LAB
ALBUMIN SERPL-MCNC: 4.1 G/DL (ref 3.5–4.7)
ALBUMIN/CREAT UR: 5.8 MG/G CREAT (ref 0–30)
ALBUMIN/GLOB SERPL: 1.1 {RATIO} (ref 1.2–2.2)
ALP SERPL-CCNC: 77 IU/L (ref 39–117)
ALT SERPL-CCNC: 8 IU/L (ref 0–32)
AST SERPL-CCNC: 13 IU/L (ref 0–40)
BILIRUB SERPL-MCNC: 0.2 MG/DL (ref 0–1.2)
BUN SERPL-MCNC: 31 MG/DL (ref 8–27)
BUN/CREAT SERPL: 17 (ref 12–28)
CALCIUM SERPL-MCNC: 9.6 MG/DL (ref 8.7–10.3)
CHLORIDE SERPL-SCNC: 101 MMOL/L (ref 96–106)
CHOLEST SERPL-MCNC: 156 MG/DL (ref 100–199)
CO2 SERPL-SCNC: 23 MMOL/L (ref 20–29)
CREAT SERPL-MCNC: 1.78 MG/DL (ref 0.57–1)
CREAT UR-MCNC: 188.7 MG/DL
ERYTHROCYTE [DISTWIDTH] IN BLOOD BY AUTOMATED COUNT: 15.3 % (ref 12.3–15.4)
GLOBULIN SER CALC-MCNC: 3.9 G/DL (ref 1.5–4.5)
GLUCOSE SERPL-MCNC: 102 MG/DL (ref 65–99)
HBA1C MFR BLD: 6.3 % (ref 4.8–5.6)
HCT VFR BLD AUTO: 32.9 % (ref 34–46.6)
HDLC SERPL-MCNC: 70 MG/DL
HGB BLD-MCNC: 10.4 G/DL (ref 11.1–15.9)
LDLC SERPL CALC-MCNC: 70 MG/DL (ref 0–99)
MCH RBC QN AUTO: 25.5 PG (ref 26.6–33)
MCHC RBC AUTO-ENTMCNC: 31.6 G/DL (ref 31.5–35.7)
MCV RBC AUTO: 81 FL (ref 79–97)
MICROALBUMIN UR-MCNC: 10.9 UG/ML
PLATELET # BLD AUTO: 313 X10E3/UL (ref 150–379)
POTASSIUM SERPL-SCNC: 4.2 MMOL/L (ref 3.5–5.2)
PROT SERPL-MCNC: 8 G/DL (ref 6–8.5)
RBC # BLD AUTO: 4.08 X10E6/UL (ref 3.77–5.28)
SODIUM SERPL-SCNC: 143 MMOL/L (ref 134–144)
TRIGL SERPL-MCNC: 80 MG/DL (ref 0–149)
VLDLC SERPL CALC-MCNC: 16 MG/DL (ref 5–40)
WBC # BLD AUTO: 5.9 X10E3/UL (ref 3.4–10.8)

## 2018-08-21 ENCOUNTER — TELEPHONE (OUTPATIENT)
Dept: INTERNAL MEDICINE CLINIC | Age: 83
End: 2018-08-21

## 2018-08-21 NOTE — TELEPHONE ENCOUNTER
Advise patient's family. Kidney function is has decline. Stop the triamterene/hctz. Watch salt in diet. Encourage water drinking. Monitor BP. Mild anemia is stable. Diabetes and cholesterol well controlled. Schedule follow up in 3 months.

## 2018-08-22 NOTE — TELEPHONE ENCOUNTER
Spoke with patients daughter Sparkle Lynn ([facility])  Two pt identifiers confirmed. Summers notified that patients recent labs showed Kidney function is has decline. Summers advised that patient nees to stop the triamterene/hctz. Summers advised that patient needs to watch salt in diet. Summers advise that patient is encouraged to drink plenty of water during the day. Summers advised that they need to monitor BP. Summers advised that the rest of the patients labs showed that mild anemia is stable. Diabetes and cholesterol well controlled. Summers advised that patient needs to schedule follow up in 3 months. Summers verbalized understanding of information discussed w/ no further questions at this time. Patients 3 month followup has been scheduled.

## 2018-10-12 RX ORDER — VERAPAMIL HYDROCHLORIDE 240 MG/1
TABLET, FILM COATED, EXTENDED RELEASE ORAL
Qty: 90 TAB | Refills: 2 | Status: SHIPPED | OUTPATIENT
Start: 2018-10-12 | End: 2018-11-29

## 2018-10-29 RX ORDER — PRAVASTATIN SODIUM 40 MG/1
TABLET ORAL
Qty: 90 TAB | Refills: 2 | Status: SHIPPED | OUTPATIENT
Start: 2018-10-29 | End: 2019-02-14 | Stop reason: SDUPTHER

## 2018-11-26 ENCOUNTER — TELEPHONE (OUTPATIENT)
Dept: INTERNAL MEDICINE CLINIC | Age: 83
End: 2018-11-26

## 2018-11-26 NOTE — TELEPHONE ENCOUNTER
HIPAA verified with patient's daughter Tae Lorenzo requested to be seen by Dr. Sonja Orantes for dizziness.  Appointment was rescheduled with Dr. Sonja Orantes on 11/29 at 4 pm.

## 2018-11-29 ENCOUNTER — OFFICE VISIT (OUTPATIENT)
Dept: INTERNAL MEDICINE CLINIC | Age: 83
End: 2018-11-29

## 2018-11-29 VITALS
SYSTOLIC BLOOD PRESSURE: 172 MMHG | WEIGHT: 194.6 LBS | HEART RATE: 73 BPM | DIASTOLIC BLOOD PRESSURE: 68 MMHG | BODY MASS INDEX: 31.27 KG/M2 | HEIGHT: 66 IN | OXYGEN SATURATION: 99 % | RESPIRATION RATE: 16 BRPM | TEMPERATURE: 97.8 F

## 2018-11-29 DIAGNOSIS — I10 ESSENTIAL HYPERTENSION: ICD-10-CM

## 2018-11-29 DIAGNOSIS — N28.9 RENAL INSUFFICIENCY: Primary | ICD-10-CM

## 2018-11-29 DIAGNOSIS — Z23 ENCOUNTER FOR IMMUNIZATION: ICD-10-CM

## 2018-11-29 RX ORDER — VERAPAMIL HYDROCHLORIDE 120 MG/1
120 CAPSULE, EXTENDED RELEASE ORAL DAILY
Qty: 7 CAP | Refills: 0 | Status: SHIPPED | OUTPATIENT
Start: 2018-11-29 | End: 2018-12-28

## 2018-11-29 RX ORDER — AMLODIPINE BESYLATE 5 MG/1
5 TABLET ORAL DAILY
Qty: 30 TAB | Refills: 3 | Status: SHIPPED | OUTPATIENT
Start: 2018-11-29 | End: 2018-12-28 | Stop reason: SDUPTHER

## 2018-11-29 NOTE — PATIENT INSTRUCTIONS
Stop the verapamil 240mg dose. Take verapamil 120mg once a day for one week. Then stop. Take the fluid pill (triamterene/hctz) every other day for now. That is the one we held in August.   
 
Start amlodipine 5mg once a day. Goal blood pressure is less than 160 at all times. We will call with the blood test results.

## 2018-11-29 NOTE — PROGRESS NOTES
Holly Schwartz is a 80 y.o. female who is in with daughter. Daughter reports that she has been lightheaded on arising. Will be seated, gets up, and feels dizzy, \"like Im going to fall\". If she stands for a while, she will feel better. No falls. BP high today. Not checking BP at home. BP was 136/67 in August. Weight gain 8# since August.  Off dyazide and watching salt. no swelling. Past Medical History:  
Diagnosis Date  DM Renal Manif Type II 2/11/2010  GERD (gastroesophageal reflux disease) 2/11/2010  Glaucoma, open angle 10/29/2010  
 Headache   
 HTN (hypertension) 2/11/2010  Hypercholesterolemia  Mixed hyperlipidemia 2/11/2010  Type II or unspecified type diabetes mellitus with renal manifestations, not stated as uncontrolled(250.40) ([facility] [address] 2/11/2010 Family History Problem Relation Age of Onset  Cancer Mother   
     lung  Hypertension Mother  Cancer Brother   
     stomach  Hypertension Brother  Other Father   
     accidental death when pt was a little girl  Diabetes Brother Social History Socioeconomic History  Marital status:  Spouse name: Not on file  Number of children: Not on file  Years of education: Not on file  Highest education level: Not on file Social Needs  Financial resource strain: Not on file  Food insecurity - worry: Not on file  Food insecurity - inability: Not on file  Transportation needs - medical: Not on file  Transportation needs - non-medical: Not on file Occupational History  Not on file Tobacco Use  Smoking status: Never Smoker  Smokeless tobacco: Never Used Substance and Sexual Activity  Alcohol use: No  
 Drug use: No  
 Sexual activity: No  
  Partners: Male Birth control/protection: None Other Topics Concern  Not on file Social History Narrative  Not on file Current Outpatient Medications on File Prior to Visit Medication Sig Dispense Refill  pravastatin (PRAVACHOL) 40 mg tablet TAKE 1 TABLET BY MOUTH NIGHTLY 90 Tab 2  
 metFORMIN ER (FORTAMET) 1,000 mg tr24 Take 1 Tab by mouth once over twenty-four (24) hours. 90 Tab 3  
 latanoprost (XALATAN) 0.005 % ophthalmic solution  acetaminophen (TYLENOL) 325 mg tablet Take 325-650 mg by mouth every six (6) hours as needed for Pain. Indications: HEADACHE DISORDER    
 triamterene-hydroCHLOROthiazide (DYAZIDE) 37.5-25 mg per capsule triamterene 37.5 mg-hydrochlorothiazide 25 mg capsule  aspirin delayed-release 81 mg tablet Take 81 mg by mouth daily. Taking every other day  Indications: myocardial infarction prevention No current facility-administered medications on file prior to visit. Review of Systems Pertinent items are noted in HPI. Objective:  
 
Visit Vitals /68 Pulse 73 Temp 97.8 °F (36.6 °C) (Oral) Resp 16 Ht 5' 6\" (1.676 m) Wt 194 lb 9.6 oz (88.3 kg) SpO2 99% BMI 31.41 kg/m² Gen: well appearing female HEENT:   PERRL,normal conjunctiva. External ear and canals normal, TMs no opacification or erythema,  OP no erythema, no exudates, MMM Neck:  Supple. Thyroid normal size, nontender, without nodules. No masses or LAD Resp:  No wheezing, no rhonchi, no rales. CV:  RRR, normal S1S2, no murmur. GI: soft, nontender, without masses. No hepatosplenomegaly. Extrem:  +2 pulses, no edema, warm distally Assessment/Plan: ICD-10-CM ICD-9-CM 1. Renal insufficiency A54.5 063.1 METABOLIC PANEL, BASIC 2. Encounter for immunization Z23 V03.89 INFLUENZA VACCINE INACTIVATED (IIV), SUBUNIT, ADJUVANTED, IM  
   ADMIN INFLUENZA VIRUS VAC 3. Essential hypertension X44 338.7 METABOLIC PANEL, BASIC  
   verapamil ER (VERELAN) 120 mg ER capsule  
   amLODIPine (NORVASC) 5 mg tablet Stop the verapamil 240mg dose.   Take verapamil 120mg once a day for one week. Then stop. Take the fluid pill (triamterene/hctz) every other day for now. That is the one we held in August.   
 
Start amlodipine 5mg once a day. Goal blood pressure is less than 160 at all times. We will call with the blood test results. Follow-up Disposition: Not on File John Salinas MD

## 2018-12-13 ENCOUNTER — HOSPITAL ENCOUNTER (OUTPATIENT)
Dept: LAB | Age: 83
Discharge: HOME | End: 2018-12-13
Payer: MEDICARE

## 2018-12-13 PROCEDURE — 36415 COLL VENOUS BLD VENIPUNCTURE: CPT

## 2018-12-13 PROCEDURE — 80048 BASIC METABOLIC PNL TOTAL CA: CPT

## 2018-12-14 LAB
BUN SERPL-MCNC: 19 MG/DL (ref 8–27)
BUN/CREAT SERPL: 16 (ref 12–28)
CALCIUM SERPL-MCNC: 9.2 MG/DL (ref 8.7–10.3)
CHLORIDE SERPL-SCNC: 108 MMOL/L (ref 96–106)
CO2 SERPL-SCNC: 23 MMOL/L (ref 20–29)
CREAT SERPL-MCNC: 1.19 MG/DL (ref 0.57–1)
GLUCOSE SERPL-MCNC: 110 MG/DL (ref 65–99)
POTASSIUM SERPL-SCNC: 4.5 MMOL/L (ref 3.5–5.2)
SODIUM SERPL-SCNC: 144 MMOL/L (ref 134–144)

## 2018-12-14 NOTE — PROGRESS NOTES
Notify patient's daughter kidney test is near normal off the diuretic. How is BP doing since we changed medication?

## 2018-12-28 ENCOUNTER — OFFICE VISIT (OUTPATIENT)
Dept: INTERNAL MEDICINE CLINIC | Age: 83
End: 2018-12-28

## 2018-12-28 VITALS
WEIGHT: 195.8 LBS | RESPIRATION RATE: 16 BRPM | OXYGEN SATURATION: 98 % | SYSTOLIC BLOOD PRESSURE: 144 MMHG | HEIGHT: 66 IN | TEMPERATURE: 98.2 F | BODY MASS INDEX: 31.47 KG/M2 | DIASTOLIC BLOOD PRESSURE: 73 MMHG | HEART RATE: 78 BPM

## 2018-12-28 DIAGNOSIS — I10 ESSENTIAL HYPERTENSION, BENIGN: Primary | Chronic | ICD-10-CM

## 2018-12-28 DIAGNOSIS — E11.9 CONTROLLED TYPE 2 DIABETES MELLITUS WITHOUT COMPLICATION, WITHOUT LONG-TERM CURRENT USE OF INSULIN (HCC): Chronic | ICD-10-CM

## 2018-12-28 LAB — HBA1C MFR BLD HPLC: 6.5 %

## 2018-12-28 RX ORDER — AMLODIPINE BESYLATE 5 MG/1
5 TABLET ORAL DAILY
Qty: 90 TAB | Refills: 3 | Status: SHIPPED | OUTPATIENT
Start: 2018-12-28 | End: 2019-02-14 | Stop reason: SDUPTHER

## 2018-12-28 NOTE — PROGRESS NOTES
Reviewed record in preparation for visit and have obtained necessary documentation. Identified pt with two pt identifiers(name and ). Chief Complaint Patient presents with  Hypertension 4 week follow up  Medication Evaluation Pt non fasting Health Maintenance Due Topic Date Due  Shingles Vaccine (1 of 2) 1982  Pneumococcal Vaccine (2 of 2 - PCV13) 2014 Ms. Mark Morales has a reminder for a \"due or due soon\" health maintenance. I have asked that she discuss this further with her primary care provider for follow-up on this health maintenance. Coordination of Care Questionnaire: 
:  
 
1) Have you been to an emergency room, urgent care clinic since your last visit? no  
Hospitalized since your last visit? no          
 
2) Have you seen or consulted any other health care providers outside of [address] [place] [address] since your last visit? no  (Include any pap smears or colon screenings in this section.) 3) In the event something were to happen to you and you were unable to speak on your behalf, do you have an Advance Directive/ Living Will in place stating your wishes? NO Do you have an Advance Directive on file? no 
 
4) Are you interested in receiving information on Advance Directives? NO Patient is accompanied by daughter I have received verbal consent from López Monday to discuss any/all medical information while they are present in the room.

## 2018-12-28 NOTE — PATIENT INSTRUCTIONS
Check blood pressure at least 3 times a week, goal is always under 160. Call or [employer] message with readings after 2-3 weeks. Continue the amlodipine and continue off the triamterene/hctz (diuretic). Body Mass Index: Care Instructions Your Care Instructions Body mass index (BMI) can help you see if your weight is raising your risk for health problems. It uses a formula to compare how much you weigh with how tall you are. · A BMI lower than 18.5 is considered underweight. · A BMI between 18.5 and 24.9 is considered healthy. · A BMI between 25 and 29.9 is considered overweight. A BMI of 30 or higher is considered obese. If your BMI is in the normal range, it means that you have a lower risk for weight-related health problems. If your BMI is in the overweight or obese range, you may be at increased risk for weight-related health problems, such as high blood pressure, heart disease, stroke, arthritis or joint pain, and diabetes. If your BMI is in the underweight range, you may be at increased risk for health problems such as fatigue, lower protection (immunity) against illness, muscle loss, bone loss, hair loss, and hormone problems. BMI is just one measure of your risk for weight-related health problems. You may be at higher risk for health problems if you are not active, you eat an unhealthy diet, or you drink too much alcohol or use tobacco products. Follow-up care is a key part of your treatment and safety. Be sure to make and go to all appointments, and call your doctor if you are having problems. It's also a good idea to know your test results and keep a list of the medicines you take. How can you care for yourself at home? · Practice healthy eating habits. This includes eating plenty of fruits, vegetables, whole grains, lean protein, and low-fat dairy. · If your doctor recommends it, get more exercise. Walking is a good choice. Bit by bit, increase the amount you walk every day.  Try for at least 30 minutes on most days of the week. · Do not smoke. Smoking can increase your risk for health problems. If you need help quitting, talk to your doctor about stop-smoking programs and medicines. These can increase your chances of quitting for good. · Limit alcohol to 2 drinks a day for men and 1 drink a day for women. Too much alcohol can cause health problems. If you have a BMI higher than 25 · Your doctor may do other tests to check your risk for weight-related health problems. This may include measuring the distance around your waist. A waist measurement of more than 40 inches in men or 35 inches in women can increase the risk of weight-related health problems. · Talk with your doctor about steps you can take to stay healthy or improve your health. You may need to make lifestyle changes to lose weight and stay healthy, such as changing your diet and getting regular exercise. If you have a BMI lower than 18.5 · Your doctor may do other tests to check your risk for health problems. · Talk with your doctor about steps you can take to stay healthy or improve your health. You may need to make lifestyle changes to gain or maintain weight and stay healthy, such as getting more healthy foods in your diet and doing exercises to build muscle. Where can you learn more? Go to http://annelise-mirna.info/. Enter S176 in the search box to learn more about \"Body Mass Index: Care Instructions. \" Current as of: October 13, 2016 Content Version: 11.4 © 2872-9403 [employer]. Care instructions adapted under license by [employer] (which disclaims liability or warranty for this information). If you have questions about a medical condition or this instruction, always ask your healthcare professional. [address] any warranty or liability for your use of this information.

## 2018-12-28 NOTE — PROGRESS NOTES
Doni Harding is a 80 y.o. female who presents with daughter. Lives with other daughter. At visit on 11/29, stopped verapamil 240mg and started amlodipine 5mg once a day. Given dyazide every other day. Currently, not on diuretic. No swelling. Watches sodium intake. BP better. Checking BP at home, it was 160 at home. No complaints. Weight stable, eating ok. Normal bowel and bladder habits. Sleep is ok. No pain complaints. HbA1C 6.3%, follows diabetic diet. On metformin. On statin, LDL at goal in August.   
 
 
Past Medical History:  
Diagnosis Date  DM Renal Manif Type II 2/11/2010  GERD (gastroesophageal reflux disease) 2/11/2010  Glaucoma, open angle 10/29/2010  
 Headache   
 HTN (hypertension) 2/11/2010  Hypercholesterolemia  Mixed hyperlipidemia 2/11/2010  Type II or unspecified type diabetes mellitus with renal manifestations, not stated as uncontrolled(250.40) ([facility] [address] 2/11/2010 Family History Problem Relation Age of Onset  Cancer Mother   
     lung  Hypertension Mother  Cancer Brother   
     stomach  Hypertension Brother  Other Father   
     accidental death when pt was a little girl  Diabetes Brother Social History Socioeconomic History  Marital status:  Spouse name: Not on file  Number of children: Not on file  Years of education: Not on file  Highest education level: Not on file Social Needs  Financial resource strain: Not on file  Food insecurity - worry: Not on file  Food insecurity - inability: Not on file  Transportation needs - medical: Not on file  Transportation needs - non-medical: Not on file Occupational History  Not on file Tobacco Use  Smoking status: Never Smoker  Smokeless tobacco: Never Used Substance and Sexual Activity  Alcohol use: No  
 Drug use: No  
 Sexual activity: No  
  Partners: Male Birth control/protection: None Other Topics Concern  Not on file Social History Narrative  Not on file Current Outpatient Medications on File Prior to Visit Medication Sig Dispense Refill  pravastatin (PRAVACHOL) 40 mg tablet TAKE 1 TABLET BY MOUTH NIGHTLY 90 Tab 2  
 metFORMIN ER (FORTAMET) 1,000 mg tr24 Take 1 Tab by mouth once over twenty-four (24) hours. 90 Tab 3  
 latanoprost (XALATAN) 0.005 % ophthalmic solution  acetaminophen (TYLENOL) 325 mg tablet Take 325-650 mg by mouth every six (6) hours as needed for Pain. Indications: HEADACHE DISORDER    
 aspirin delayed-release 81 mg tablet Take 81 mg by mouth daily. Taking every other day  Indications: myocardial infarction prevention No current facility-administered medications on file prior to visit. Review of Systems Pertinent items are noted in HPI. Objective:  
 
Visit Vitals /73 (BP 1 Location: Left arm, BP Patient Position: Sitting) Pulse 78 Temp 98.2 °F (36.8 °C) (Oral) Resp 16 Ht 5' 6\" (1.676 m) Wt 195 lb 12.8 oz (88.8 kg) SpO2 98% BMI 31.60 kg/m² Gen: well appearing female HEENT:   PERRL,normal conjunctiva. External ear and canals normal, TMs no opacification or erythema,  OP no erythema, no exudates, MMM Neck:  Supple. Thyroid normal size, nontender, without nodules. No masses or LAD Resp:  No wheezing, no rhonchi, no rales. CV:  RRR, normal S1S2, no murmur. GI: soft, nontender, without masses. No hepatosplenomegaly. Extrem:  +2 pulses, no edema, warm distally Assessment/Plan: ICD-10-CM ICD-9-CM 1. Essential hypertension, benign I10 401.1 amLODIPine (NORVASC) 5 mg tablet 2. Controlled type 2 diabetes mellitus without complication, without long-term current use of insulin (HCC) E11.9 250.00 AMB POC HEMOGLOBIN A1C Recommend compliance with diabetic diet. Continue medications for diabetes. Monitor blood sugar readings as discussed. Keep up on regular diabetic eye exams. Recommend following a lower sodium diet and stay active. Blood pressure goal is less than 130/85 on average. Advised compliance with blood pressure medication and regular follow up. Continue the amlodipine and continue off the triamterene/hctz Follow-up Disposition: 
Return in about 6 months (around 6/28/2019) for follow up on medications/fasting labs. Roddy Manriquez MD 
 
Discussed the patient's BMI with her. The BMI follow up plan is as follows:  
 
dietary management education, guidance, and counseling 
encourage exercise 
monitor weight 
prescribed dietary intake An After Visit Summary was printed and given to the patient.

## 2019-02-14 ENCOUNTER — OFFICE VISIT (OUTPATIENT)
Dept: INTERNAL MEDICINE CLINIC | Age: 84
End: 2019-02-14

## 2019-02-14 VITALS
WEIGHT: 197.4 LBS | OXYGEN SATURATION: 98 % | BODY MASS INDEX: 31.72 KG/M2 | DIASTOLIC BLOOD PRESSURE: 74 MMHG | HEART RATE: 86 BPM | TEMPERATURE: 97.7 F | HEIGHT: 66 IN | SYSTOLIC BLOOD PRESSURE: 152 MMHG | RESPIRATION RATE: 16 BRPM

## 2019-02-14 DIAGNOSIS — E78.00 PURE HYPERCHOLESTEROLEMIA: ICD-10-CM

## 2019-02-14 DIAGNOSIS — E11.9 CONTROLLED TYPE 2 DIABETES MELLITUS WITHOUT COMPLICATION, WITHOUT LONG-TERM CURRENT USE OF INSULIN (HCC): Chronic | ICD-10-CM

## 2019-02-14 DIAGNOSIS — I10 ESSENTIAL HYPERTENSION, BENIGN: Primary | Chronic | ICD-10-CM

## 2019-02-14 RX ORDER — AMLODIPINE BESYLATE 5 MG/1
5 TABLET ORAL DAILY
Qty: 90 TAB | Refills: 3 | Status: SHIPPED | OUTPATIENT
Start: 2019-02-14 | End: 2019-11-19 | Stop reason: DRUGHIGH

## 2019-02-14 RX ORDER — METFORMIN HYDROCHLORIDE EXTENDED-RELEASE TABLETS 1000 MG/1
1 TABLET, FILM COATED, EXTENDED RELEASE ORAL
Qty: 90 TAB | Refills: 3 | Status: SHIPPED | OUTPATIENT
Start: 2019-02-14 | End: 2019-03-13 | Stop reason: SDUPTHER

## 2019-02-14 RX ORDER — PRAVASTATIN SODIUM 40 MG/1
40 TABLET ORAL DAILY
Qty: 90 TAB | Refills: 3 | Status: SHIPPED | OUTPATIENT
Start: 2019-02-14 | End: 2019-04-30 | Stop reason: SDUPTHER

## 2019-02-14 NOTE — PATIENT INSTRUCTIONS
INCREASE TO 3-4 GLASSES OF WATER A DAY. LOW SALT DIET, TRY TO GET MORE ACTIVE, WORK ON WEIGHT LOSS BY REDUCING PORTIONS AND STARCHES.  TRY THE FROZEN VEGGIES.

## 2019-02-14 NOTE — PROGRESS NOTES
Jewel Padgett is a 80 y.o. female who presents with daughter. Lives with other daughter. 129/58- 181/83 at home. On amlodipine 5mg alone, off verapamil. Will get anxious in evening. Low salt diet. No significant swelling. No dizziness. No falls. Off dyazide is better. Creatinine 1. 19. Drinking water 2 glasses a day. On statin, no  Myalgias. LDL 70 in August 2018. On metformin, no GI side effects. Follows diabetic diet. Weight stable. Hba1C 6.5% in December 2018. Sees eye doctor every 6 months, glaucoma. Past Medical History:  
Diagnosis Date  DM Renal Manif Type II 2/11/2010  GERD (gastroesophageal reflux disease) 2/11/2010  Glaucoma, open angle 10/29/2010  
 Headache   
 HTN (hypertension) 2/11/2010  Hypercholesterolemia  Mixed hyperlipidemia 2/11/2010  Type II or unspecified type diabetes mellitus with renal manifestations, not stated as uncontrolled(250.40) ([facility] [address] 2/11/2010 Family History Problem Relation Age of Onset  Cancer Mother   
     lung  Hypertension Mother  Cancer Brother   
     stomach  Hypertension Brother  Other Father   
     accidental death when pt was a little girl  Diabetes Brother Social History Socioeconomic History  Marital status:  Spouse name: Not on file  Number of children: Not on file  Years of education: Not on file  Highest education level: Not on file Social Needs  Financial resource strain: Not on file  Food insecurity - worry: Not on file  Food insecurity - inability: Not on file  Transportation needs - medical: Not on file  Transportation needs - non-medical: Not on file Occupational History  Not on file Tobacco Use  Smoking status: Never Smoker  Smokeless tobacco: Never Used Substance and Sexual Activity  Alcohol use: No  
 Drug use: No  
 Sexual activity: No  
  Partners: Male Birth control/protection: None Other Topics Concern  Not on file Social History Narrative  Not on file Current Outpatient Medications on File Prior to Visit Medication Sig Dispense Refill  latanoprost (XALATAN) 0.005 % ophthalmic solution  aspirin delayed-release 81 mg tablet Take 81 mg by mouth daily. Taking every other day  Indications: myocardial infarction prevention  acetaminophen (TYLENOL) 325 mg tablet Take 325-650 mg by mouth every six (6) hours as needed for Pain. Indications: HEADACHE DISORDER No current facility-administered medications on file prior to visit. Review of Systems Pertinent items are noted in HPI. Objective:  
 
Visit Vitals /74 Pulse 86 Temp 97.7 °F (36.5 °C) (Oral) Resp 16 Ht 5' 6\" (1.676 m) Wt 197 lb 6.4 oz (89.5 kg) SpO2 98% BMI 31.86 kg/m² Gen: well appearing female HEENT:   PERRL,normal conjunctiva. External ear and canals normal, TMs no opacification or erythema,  OP no erythema, no exudates, MMM Neck:  Supple. Thyroid normal size, nontender, without nodules. No masses or LAD Resp:  No wheezing, no rhonchi, no rales. CV:  RRR, normal D3V1, 2/6 systolic murmur. GI: soft, nontender, without masses. No hepatosplenomegaly. Extrem:  +2 pulses, no edema, warm distally Assessment/Plan: ICD-10-CM ICD-9-CM 1. Essential hypertension, benign I10 401.1 amLODIPine (NORVASC) 5 mg tablet 2. Controlled type 2 diabetes mellitus without complication, without long-term current use of insulin (HCC) E11.9 250.00 metFORMIN ER (FORTAMET) 1,000 mg tr24 3. Pure hypercholesterolemia E78.00 272.0 pravastatin (PRAVACHOL) 40 mg tablet INCREASE TO 3-4 GLASSES OF WATER A DAY. LOW SALT DIET, TRY TO GET MORE ACTIVE, WORK ON WEIGHT LOSS BY REDUCING PORTIONS AND STARCHES.  TRY THE FROZEN VEGGIES. Follow-up Disposition: 
Return in about 6 months (around 8/14/2019) for FOLLOW UP ON MEDICATIONS. FASTING LABS ON RETURN.   Gurdeep Caban 
 
 Alton Ugarte MD

## 2019-03-13 DIAGNOSIS — E11.9 CONTROLLED TYPE 2 DIABETES MELLITUS WITHOUT COMPLICATION, WITHOUT LONG-TERM CURRENT USE OF INSULIN (HCC): Chronic | ICD-10-CM

## 2019-03-13 RX ORDER — METFORMIN HYDROCHLORIDE EXTENDED-RELEASE TABLETS 1000 MG/1
1 TABLET, FILM COATED, EXTENDED RELEASE ORAL
Qty: 90 TAB | Refills: 3 | Status: SHIPPED | OUTPATIENT
Start: 2019-03-13 | End: 2019-03-15 | Stop reason: DRUGHIGH

## 2019-03-13 NOTE — TELEPHONE ENCOUNTER
Requested Prescriptions     Pending Prescriptions Disp Refills    metFORMIN ER (FORTAMET) 1,000 mg tr24 90 Tab 3     Sig: Take 1 Tab by mouth once over twenty-four (24) hours. PCP: Debra Carpenter MD    Last appt: 2/14/2019  Future Appointments   Date Time Provider Julián Lord   8/16/2019 11:00 AM [facility] [address]       Requested Prescriptions     Pending Prescriptions Disp Refills    metFORMIN ER (FORTAMET) 1,000 mg tr24 90 Tab 3     Sig: Take 1 Tab by mouth once over twenty-four (24) hours.

## 2019-03-14 ENCOUNTER — TELEPHONE (OUTPATIENT)
Dept: INTERNAL MEDICINE CLINIC | Age: 84
End: 2019-03-14

## 2019-03-14 DIAGNOSIS — E11.9 CONTROLLED TYPE 2 DIABETES MELLITUS WITHOUT COMPLICATION, WITHOUT LONG-TERM CURRENT USE OF INSULIN (HCC): Primary | Chronic | ICD-10-CM

## 2019-03-14 NOTE — TELEPHONE ENCOUNTER
Destini Caballero (daughter) is requesting a call back regarding her mother's Rx  Metformin 1000mg once a day will not be covered under her insurance plan but if the order was written for 500mg twice a day  that it would be covered. Best contact is [phone].        Message received & copied from [facility]

## 2019-03-15 RX ORDER — METFORMIN HYDROCHLORIDE 500 MG/1
500 TABLET ORAL 2 TIMES DAILY WITH MEALS
Qty: 180 TAB | Refills: 3 | Status: SHIPPED | OUTPATIENT
Start: 2019-03-15 | End: 2020-03-10

## 2019-03-15 NOTE — TELEPHONE ENCOUNTER
Advise daughter that is a lower dose since the 1,000mg that she is taking is extended release and the 500mg twice a day is not extended release. So it is equivalent to 500mg extended release. However, at her age, it would be good to reduce the dose. I am sending in metformin short acting 500mg twice a day.   90 days to [facility].  Follow up as scheduled, August.

## 2019-03-15 NOTE — TELEPHONE ENCOUNTER
Dane Alvarez MD 14 minutes ago (1:25 PM)         Advise daughter that is a lower dose since the 1,000mg that she is taking is extended release and the 500mg twice a day is not extended release. So it is equivalent to 500mg extended release. However, at her age, it would be good to reduce the dose. I am sending in metformin short acting 500mg twice a day. 90 days to [facility].  Follow up as scheduled, August.          Spoke with patients daughter Esperanza Chau (NADER)  Two pt identifiers confirmed. Lisette advised of the above message from Dr. Brennan Ortiz. Lisette verbalized understanding of information discussed w/ no further questions at this time.

## 2019-04-30 DIAGNOSIS — E78.00 PURE HYPERCHOLESTEROLEMIA: ICD-10-CM

## 2019-04-30 RX ORDER — PRAVASTATIN SODIUM 40 MG/1
TABLET ORAL
Qty: 90 TAB | Refills: 1 | Status: SHIPPED | OUTPATIENT
Start: 2019-04-30 | End: 2020-04-16 | Stop reason: SDUPTHER

## 2019-11-19 ENCOUNTER — HOSPITAL ENCOUNTER (OUTPATIENT)
Dept: LAB | Age: 84
Discharge: HOME | End: 2019-11-19
Payer: MEDICARE

## 2019-11-19 ENCOUNTER — OFFICE VISIT (OUTPATIENT)
Dept: INTERNAL MEDICINE CLINIC | Age: 84
End: 2019-11-19

## 2019-11-19 VITALS
TEMPERATURE: 98.9 F | OXYGEN SATURATION: 98 % | WEIGHT: 194.6 LBS | RESPIRATION RATE: 16 BRPM | SYSTOLIC BLOOD PRESSURE: 168 MMHG | BODY MASS INDEX: 31.27 KG/M2 | DIASTOLIC BLOOD PRESSURE: 72 MMHG | HEIGHT: 66 IN | HEART RATE: 84 BPM

## 2019-11-19 DIAGNOSIS — B35.1 ONYCHOMYCOSIS: ICD-10-CM

## 2019-11-19 DIAGNOSIS — E78.00 PURE HYPERCHOLESTEROLEMIA: ICD-10-CM

## 2019-11-19 DIAGNOSIS — Z23 ENCOUNTER FOR IMMUNIZATION: ICD-10-CM

## 2019-11-19 DIAGNOSIS — E11.9 CONTROLLED TYPE 2 DIABETES MELLITUS WITHOUT COMPLICATION, WITHOUT LONG-TERM CURRENT USE OF INSULIN (HCC): ICD-10-CM

## 2019-11-19 DIAGNOSIS — E11.21 TYPE 2 DIABETES MELLITUS WITH NEPHROPATHY (HCC): ICD-10-CM

## 2019-11-19 DIAGNOSIS — R35.1 NOCTURIA: ICD-10-CM

## 2019-11-19 DIAGNOSIS — I10 ESSENTIAL HYPERTENSION: Primary | ICD-10-CM

## 2019-11-19 PROCEDURE — 80061 LIPID PANEL: CPT

## 2019-11-19 PROCEDURE — 36415 COLL VENOUS BLD VENIPUNCTURE: CPT

## 2019-11-19 PROCEDURE — 80053 COMPREHEN METABOLIC PANEL: CPT

## 2019-11-19 PROCEDURE — 82043 UR ALBUMIN QUANTITATIVE: CPT

## 2019-11-19 PROCEDURE — 85027 COMPLETE CBC AUTOMATED: CPT

## 2019-11-19 PROCEDURE — 83036 HEMOGLOBIN GLYCOSYLATED A1C: CPT

## 2019-11-19 RX ORDER — CHOLECALCIFEROL (VITAMIN D3) 125 MCG
CAPSULE ORAL
COMMUNITY

## 2019-11-19 RX ORDER — AMLODIPINE BESYLATE 10 MG/1
10 TABLET ORAL DAILY
Qty: 90 TAB | Refills: 3 | Status: SHIPPED | OUTPATIENT
Start: 2019-11-19 | End: 2020-04-16 | Stop reason: SDUPTHER

## 2019-11-19 NOTE — PATIENT INSTRUCTIONS
Office Policies Phone calls/patient messages: Please allow up to 24 hours for someone in the office to contact you about your call or message. Be mindful your provider may be out of the office or your message may require further review. We encourage you to use [employer] for your messages as this is a faster, more efficient way to communicate with our office Medication Refills: 
         
Prescription medications require 48-72 business hours to process. We encourage you to use [employer] for your refills. For controlled medications: Please allow 72 business hours to process. Certain medications may require you to  a written prescription at our office. NO narcotic/controlled medications will be prescribed after 4pm Monday through Friday or on weekends Form/Paperwork Completion: 
         
Please note a $25 fee may incur for all paperwork for completed by our providers. We ask that you allow 7-10 business days. Pre-payment is due prior to picking up/faxing the completed form. You may also download your forms to [employer] to have your doctor print off. Omron monitor wrist cuff. BP less than 150 at all times. Body Mass Index: Care Instructions Your Care Instructions Body mass index (BMI) can help you see if your weight is raising your risk for health problems. It uses a formula to compare how much you weigh with how tall you are. · A BMI lower than 18.5 is considered underweight. · A BMI between 18.5 and 24.9 is considered healthy. · A BMI between 25 and 29.9 is considered overweight. A BMI of 30 or higher is considered obese. If your BMI is in the normal range, it means that you have a lower risk for weight-related health problems.  If your BMI is in the overweight or obese range, you may be at increased risk for weight-related health problems, such as high blood pressure, heart disease, stroke, arthritis or joint pain, and diabetes. If your BMI is in the underweight range, you may be at increased risk for health problems such as fatigue, lower protection (immunity) against illness, muscle loss, bone loss, hair loss, and hormone problems. BMI is just one measure of your risk for weight-related health problems. You may be at higher risk for health problems if you are not active, you eat an unhealthy diet, or you drink too much alcohol or use tobacco products. Follow-up care is a key part of your treatment and safety. Be sure to make and go to all appointments, and call your doctor if you are having problems. It's also a good idea to know your test results and keep a list of the medicines you take. How can you care for yourself at home? · Practice healthy eating habits. This includes eating plenty of fruits, vegetables, whole grains, lean protein, and low-fat dairy. · If your doctor recommends it, get more exercise. Walking is a good choice. Bit by bit, increase the amount you walk every day. Try for at least 30 minutes on most days of the week. · Do not smoke. Smoking can increase your risk for health problems. If you need help quitting, talk to your doctor about stop-smoking programs and medicines. These can increase your chances of quitting for good. · Limit alcohol to 2 drinks a day for men and 1 drink a day for women. Too much alcohol can cause health problems. If you have a BMI higher than 25 · Your doctor may do other tests to check your risk for weight-related health problems. This may include measuring the distance around your waist. A waist measurement of more than 40 inches in men or 35 inches in women can increase the risk of weight-related health problems. · Talk with your doctor about steps you can take to stay healthy or improve your health. You may need to make lifestyle changes to lose weight and stay healthy, such as changing your diet and getting regular exercise. If you have a BMI lower than 18.5 · Your doctor may do other tests to check your risk for health problems. · Talk with your doctor about steps you can take to stay healthy or improve your health. You may need to make lifestyle changes to gain or maintain weight and stay healthy, such as getting more healthy foods in your diet and doing exercises to build muscle. Where can you learn more? Go to http://annelise-mirna.info/. Enter S176 in the search box to learn more about \"Body Mass Index: Care Instructions. \" Current as of: October 13, 2016 Content Version: 11.4 © 2131-4448 [employer]. Care instructions adapted under license by [employer] (which disclaims liability or warranty for this information). If you have questions about a medical condition or this instruction, always ask your healthcare professional. [address] any warranty or liability for your use of this information. INCREASE THE AMLODIPINE TO 2 OF THE 5MG ONCE A DAY. NEW RX FOR 10MG TABLET ONCE A DAY.

## 2019-11-19 NOTE — PROGRESS NOTES
Reviewed record in preparation for visit and have obtained necessary documentation. Identified pt with two pt identifiers(name and ). Chief Complaint   Patient presents with    Hypertension    Diabetes    Medication Evaluation     Pt fasting       Health Maintenance Due   Topic Date Due    DTaP/Tdap/Td  (1 - Tdap) 1953    Shingles Vaccine (1 of 2) 1982    Annual Well Visit  02/10/2019    Diabetic Foot Care  05/10/2019    Hemoglobin A1C    2019    Albumin Urine Test  2019    Cholesterol Test   2019       Ms. Jazzmine Acosta has a reminder for a \"due or due soon\" health maintenance. I have asked that she discuss this further with her primary care provider for follow-up on this health maintenance. Coordination of Care Questionnaire:  :     1) Have you been to an emergency room, urgent care clinic since your last visit? no   Hospitalized since your last visit? no             2) Have you seen or consulted any other health care providers outside of [address] [place] [address] since your last visit? no  (Include any pap smears or colon screenings in this section.)    3) In the event something were to happen to you and you were unable to speak on your behalf, do you have an Advance Directive/ Living Will in place stating your wishes? YES    Do you have an Advance Directive on file? no    4) Are you interested in receiving information on Advance Directives? NO    Patient is accompanied by daughter I have received verbal consent from Isidra Trejo to discuss any/all medical information while they are present in the room.

## 2019-11-20 LAB
ALBUMIN SERPL-MCNC: 3.9 G/DL (ref 3.5–4.7)
ALBUMIN/CREAT UR: 422 MG/G CREAT (ref 0–30)
ALBUMIN/GLOB SERPL: 1.1 {RATIO} (ref 1.2–2.2)
ALP SERPL-CCNC: 73 IU/L (ref 39–117)
ALT SERPL-CCNC: 9 IU/L (ref 0–32)
AST SERPL-CCNC: 16 IU/L (ref 0–40)
BILIRUB SERPL-MCNC: 0.3 MG/DL (ref 0–1.2)
BUN SERPL-MCNC: 17 MG/DL (ref 8–27)
BUN/CREAT SERPL: 14 (ref 12–28)
CALCIUM SERPL-MCNC: 9.2 MG/DL (ref 8.7–10.3)
CHLORIDE SERPL-SCNC: 102 MMOL/L (ref 96–106)
CHOLEST SERPL-MCNC: 183 MG/DL (ref 100–199)
CO2 SERPL-SCNC: 24 MMOL/L (ref 20–29)
CREAT SERPL-MCNC: 1.24 MG/DL (ref 0.57–1)
CREAT UR-MCNC: 117 MG/DL
ERYTHROCYTE [DISTWIDTH] IN BLOOD BY AUTOMATED COUNT: 15 % (ref 12.3–15.4)
GLOBULIN SER CALC-MCNC: 3.6 G/DL (ref 1.5–4.5)
GLUCOSE SERPL-MCNC: 115 MG/DL (ref 65–99)
HBA1C MFR BLD: 6.1 % (ref 4.8–5.6)
HCT VFR BLD AUTO: 32.4 % (ref 34–46.6)
HDLC SERPL-MCNC: 75 MG/DL
HGB BLD-MCNC: 10.4 G/DL (ref 11.1–15.9)
LDLC SERPL CALC-MCNC: 94 MG/DL (ref 0–99)
MCH RBC QN AUTO: 26.5 PG (ref 26.6–33)
MCHC RBC AUTO-ENTMCNC: 32.1 G/DL (ref 31.5–35.7)
MCV RBC AUTO: 82 FL (ref 79–97)
MICROALBUMIN UR-MCNC: 493.7 UG/ML
PLATELET # BLD AUTO: 265 X10E3/UL (ref 150–450)
POTASSIUM SERPL-SCNC: 3.9 MMOL/L (ref 3.5–5.2)
PROT SERPL-MCNC: 7.5 G/DL (ref 6–8.5)
RBC # BLD AUTO: 3.93 X10E6/UL (ref 3.77–5.28)
SODIUM SERPL-SCNC: 144 MMOL/L (ref 134–144)
TRIGL SERPL-MCNC: 70 MG/DL (ref 0–149)
VLDLC SERPL CALC-MCNC: 14 MG/DL (ref 5–40)
WBC # BLD AUTO: 6.1 X10E3/UL (ref 3.4–10.8)

## 2019-11-23 PROBLEM — N18.30 CKD (CHRONIC KIDNEY DISEASE) STAGE 3, GFR 30-59 ML/MIN (HCC): Status: ACTIVE | Noted: 2019-11-23

## 2019-12-11 NOTE — PROGRESS NOTES
Notify family labs show mild kidney impairment and mild anemia stable. Diabetes controlled. follow up as scheduled in BP, in January

## 2019-12-11 NOTE — PROGRESS NOTES
Verified two patient identifiers, name and . Patient provided with lab results. Per Dr. Marquez [place]  Notify family labs show mild kidney impairment and mild anemia stable. Diabetes controlled. follow up as scheduled in BP, in January    No further questions at this time

## 2020-01-20 ENCOUNTER — OFFICE VISIT (OUTPATIENT)
Dept: INTERNAL MEDICINE CLINIC | Age: 85
End: 2020-01-20

## 2020-01-20 VITALS
TEMPERATURE: 98.7 F | RESPIRATION RATE: 16 BRPM | HEIGHT: 66 IN | WEIGHT: 189.4 LBS | BODY MASS INDEX: 30.44 KG/M2 | OXYGEN SATURATION: 97 % | DIASTOLIC BLOOD PRESSURE: 72 MMHG | SYSTOLIC BLOOD PRESSURE: 149 MMHG | HEART RATE: 73 BPM

## 2020-01-20 DIAGNOSIS — E78.00 PURE HYPERCHOLESTEROLEMIA: ICD-10-CM

## 2020-01-20 DIAGNOSIS — Z13.31 SCREENING FOR DEPRESSION: ICD-10-CM

## 2020-01-20 DIAGNOSIS — E11.9 CONTROLLED TYPE 2 DIABETES MELLITUS WITHOUT COMPLICATION, WITHOUT LONG-TERM CURRENT USE OF INSULIN (HCC): ICD-10-CM

## 2020-01-20 DIAGNOSIS — Z00.00 MEDICARE ANNUAL WELLNESS VISIT, SUBSEQUENT: ICD-10-CM

## 2020-01-20 DIAGNOSIS — I10 ESSENTIAL HYPERTENSION: Primary | ICD-10-CM

## 2020-01-20 NOTE — PROGRESS NOTES
Kali Porras is a 80 y.o. female who presents for follow up    Presents with daughter. Janine Gross with other daughter.       Checking BP at home. Other daughter checks it. Per daughter present today, it is running high. On amlodipine 10 mg once a day.   prior verapamil. Prior ace inhibitor cough. Prior dyazide and chlorthalidone, elevated creatinine. Trying to follow low salt diet.  No significant swelling.  No headaches. No dizziness. No falls.       On statin, no  Myalgias.  LDL 70 in August 2018.      On metformin 500mg BID, no GI side effects.  Follows diabetic diet.  Weight stable.  Hba1C 6.1% in November 2019. Elevated microalbumin. Creatinine 1.24.      Sees eye doctor every 6 months, glaucoma.      hgb 10.4 stable. Reduced aspirin to as needed. No falls.  Not using assist device.              Past Medical History:   Diagnosis Date    DM Renal Manif Type II 2/11/2010    GERD (gastroesophageal reflux disease) 2/11/2010    Glaucoma, open angle 10/29/2010    Headache     HTN (hypertension) 2/11/2010    Hypercholesterolemia     Mixed hyperlipidemia 2/11/2010    Type II or unspecified type diabetes mellitus with renal manifestations, not stated as uncontrolled(250.40) ([facility] [address] 2/11/2010       Family History   Problem Relation Age of Onset    Cancer Mother         lung    Hypertension Mother     Cancer Brother         stomach    Hypertension Brother     Other Father         accidental death when pt was a little girl    Diabetes Brother        Social History     Socioeconomic History    Marital status:      Spouse name: Not on file    Number of children: Not on file    Years of education: Not on file    Highest education level: Not on file   Occupational History    Not on file   Social Needs    Financial resource strain: Not on file    Food insecurity:     Worry: Not on file     Inability: Not on file    Transportation needs:     Medical: Not on file     Non-medical: Not on file   Tobacco Use    Smoking status: Never Smoker    Smokeless tobacco: Never Used   Substance and Sexual Activity    Alcohol use: No    Drug use: No    Sexual activity: Not Currently     Partners: Male     Birth control/protection: None   Lifestyle    Physical activity:     Days per week: Not on file     Minutes per session: Not on file    Stress: Not on file   Relationships    Social connections:     Talks on phone: Not on file     Gets together: Not on file     Attends [employer] service: Not on file     Active member of club or organization: Not on file     Attends meetings of clubs or organizations: Not on file     Relationship status: Not on file    Intimate partner violence:     Fear of current or ex partner: Not on file     Emotionally abused: Not on file     Physically abused: Not on file     Forced sexual activity: Not on file   Other Topics Concern    Not on file   Social History Narrative    Not on file       Current Outpatient Medications on File Prior to Visit   Medication Sig Dispense Refill    cholecalciferol, vitamin D3, (VITAMIN D3) 2,000 unit tab Take  by mouth.  amLODIPine (NORVASC) 10 mg tablet Take 1 Tab by mouth daily. 90 Tab 3    pravastatin (PRAVACHOL) 40 mg tablet TAKE 1 TABLET BY MOUTH AT BEDTIME 90 Tab 1    metFORMIN (GLUCOPHAGE) 500 mg tablet Take 1 Tab by mouth two (2) times daily (with meals). 180 Tab 3    latanoprost (XALATAN) 0.005 % ophthalmic solution       acetaminophen (TYLENOL) 325 mg tablet Take 325-650 mg by mouth every six (6) hours as needed for Pain. Indications: HEADACHE DISORDER      aspirin delayed-release 81 mg tablet Take 81 mg by mouth daily. Taking every other day  Indications: myocardial infarction prevention       No current facility-administered medications on file prior to visit. Review of Systems  Pertinent items are noted in HPI.     Objective:     Visit Vitals  /72 (BP 1 Location: Left arm, BP Patient Position: Sitting) Pulse 73   Temp 98.7 °F (37.1 °C) (Oral)   Resp 16   Ht 5' 6\" (1.676 m)   Wt 189 lb 6.4 oz (85.9 kg)   SpO2 97%   BMI 30.57 kg/m²     Gen: well appearing female  HEENT:   PERRL,normal conjunctiva. External ear and canals normal, TM on left  no opacification or erythema,  Right TM blocked by cerumen, OP no erythema, no exudates, MMM  Neck:   No masses or LAD  Resp:  No wheezing, no rhonchi, no rales. CV:  RRR, normal S1S2, no murmur. GI: soft, nontender, without masses. Extrem:  +2 pulses,trace edema, warm distally      Assessment/Plan:       ICD-10-CM ICD-9-CM    1. Essential hypertension I10 401.9    2. Medicare annual wellness visit, subsequent Z00.00 V70.0    3. Screening for depression Z13.31 V79.0 DEPRESSION SCREEN ANNUAL   4. Controlled type 2 diabetes mellitus without complication, without long-term current use of insulin (HCC) E11.9 250.00    5. Pure hypercholesterolemia E78.00 272.0      Check blood pressure at home with wrist cuff and send [employer] message with readings. Goal bp is less than 150 at all times. Low salt diet. Follow-up and Dispositions    · Return in about 3 months (around 4/20/2020) for follow up and fasting labs.   Luis Antonio Huitron MD

## 2020-01-20 NOTE — PATIENT INSTRUCTIONS
Office Policies Phone calls/patient messages: Please allow up to 24 hours for someone in the office to contact you about your call or message. Be mindful your provider may be out of the office or your message may require further review. We encourage you to use [employer] for your messages as this is a faster, more efficient way to communicate with our office Medication Refills: 
         
Prescription medications require 48-72 business hours to process. We encourage you to use [employer] for your refills. For controlled medications: Please allow 72 business hours to process. Certain medications may require you to  a written prescription at our office. NO narcotic/controlled medications will be prescribed after 4pm Monday through Friday or on weekends Form/Paperwork Completion: 
         
Please note a $25 fee may incur for all paperwork for completed by our providers. We ask that you allow 7-10 business days. Pre-payment is due prior to picking up/faxing the completed form. You may also download your forms to [employer] to have your doctor print off. Medicare Wellness Visit, Female The best way to live healthy is to have a lifestyle where you eat a well-balanced diet, exercise regularly, limit alcohol use, and quit all forms of tobacco/nicotine, if applicable. Regular preventive services are another way to keep healthy. Preventive services (vaccines, screening tests, monitoring & exams) can help personalize your care plan, which helps you manage your own care. Screening tests can find health problems at the earliest stages, when they are easiest to treat. [facility] follows the current, evidence-based guidelines published by the [facility] Charanjit Raul (USPSTF) when recommending preventive services for our patients.  Because we follow these guidelines, sometimes recommendations change over time as research supports it. (For example, mammograms used to be recommended annually. Even though Medicare will still pay for an annual mammogram, the newer guidelines recommend a mammogram every two years for women of average risk). Of course, you and your doctor may decide to screen more often for some diseases, based on your risk and your co-morbidities (chronic disease you are already diagnosed with). Preventive services for you include: - Medicare offers their members a free annual wellness visit, which is time for you and your primary care provider to discuss and plan for your preventive service needs. Take advantage of this benefit every year! 
-All adults over the age of 72 should receive the recommended pneumonia vaccines. Current USPSTF guidelines recommend a series of two vaccines for the best pneumonia protection.  
-All adults should have a flu vaccine yearly and a tetanus vaccine every 10 years.  
-All adults age 48 and older should receive the shingles vaccines (series of two vaccines). -All adults age 38-68 who are overweight should have a diabetes screening test once every three years.  
-All adults born between [birth year] and 1965 should be screened once for Hepatitis C. 
-Other screening tests and preventive services for persons with diabetes include: an eye exam to screen for diabetic retinopathy, a kidney function test, a foot exam, and stricter control over your cholesterol.  
-Cardiovascular screening for adults with routine risk involves an electrocardiogram (ECG) at intervals determined by your doctor.  
-Colorectal cancer screenings should be done for adults age 54-65 with no increased risk factors for colorectal cancer. There are a number of acceptable methods of screening for this type of cancer. Each test has its own benefits and drawbacks.  Discuss with your doctor what is most appropriate for you during your annual wellness visit. The different tests include: colonoscopy (considered the best screening method), a fecal occult blood test, a fecal DNA test, and sigmoidoscopy. 
 
-A bone mass density test is recommended when a woman turns 65 to screen for osteoporosis. This test is only recommended one time, as a screening. Some providers will use this same test as a disease monitoring tool if you already have osteoporosis. -Breast cancer screenings are recommended every other year for women of normal risk, age 54-69. 
-Cervical cancer screenings for women over age 72 are only recommended with certain risk factors. Check blood pressure at home with wrist cuff and send [employer] message with readings. Goal bp is less than 150 at all times. Low salt diet.

## 2020-01-20 NOTE — PROGRESS NOTES
This is the Subsequent Medicare Annual Wellness Exam, performed 12 months or more after the Initial AWV or the last Subsequent AWV    I have reviewed the patient's medical history in detail and updated the computerized patient record. History     Patient Active Problem List   Diagnosis Code    [facility] 2, controlled[facility] complications [facility] U50.2    Mixed hyperlipidemia E78.2    GERD (gastroesophageal reflux disease) K21.9    Allergic rhinitis J30.9    Glaucoma, open angle H40.10X0    Alzheimer's dementia ([facility] [address] G30.9, F02.80    Edema of both legs R60.0    Unintentional weight loss O15.2    Systolic murmur E74.7    Essential hypertension, benign I10    ACE inhibitor intolerance Z78.9    Dementia without behavioral disturbance ([facility]) F03.90    Vitamin D deficiency E55.9    Type 2 diabetes mellitus with nephropathy ([facility]) E11.21    Renal insufficiency N28.9    CKD (chronic kidney disease) stage 3, GFR 30-59 ml/min ([facility]) N18.3     Past Medical History:   Diagnosis Date    DM Renal Manif Type II 2/11/2010    GERD (gastroesophageal reflux disease) 2/11/2010    Glaucoma, open angle 10/29/2010    Headache     HTN (hypertension) 2/11/2010    Hypercholesterolemia     Mixed hyperlipidemia 2/11/2010    Type II or unspecified type diabetes mellitus with renal manifestations, not stated as uncontrolled(250.40) ([facility] 2/11/2010      Past Surgical History:   Procedure Laterality Date    HX COLONOSCOPY  10/5/2005    tics and int. hem; Dr. Gena Dunn; 10 year repeat    HX LARISSA AND BSO      [place] EGD TRANSORAL BIOPSY SINGLE/MULTIPLE  7/1/2013          Current Outpatient Medications   Medication Sig Dispense Refill    cholecalciferol, vitamin D3, (VITAMIN D3) 2,000 unit tab Take  by mouth.  amLODIPine (NORVASC) 10 mg tablet Take 1 Tab by mouth daily.  90 Tab 3    pravastatin (PRAVACHOL) 40 mg tablet TAKE 1 TABLET BY MOUTH AT BEDTIME 90 Tab 1    metFORMIN (GLUCOPHAGE) 500 mg tablet Take 1 Tab by mouth two (2) times daily (with meals). 180 Tab 3    latanoprost (XALATAN) 0.005 % ophthalmic solution       aspirin delayed-release 81 mg tablet Take 81 mg by mouth daily. Taking every other day  Indications: myocardial infarction prevention      acetaminophen (TYLENOL) 325 mg tablet Take 325-650 mg by mouth every six (6) hours as needed for Pain. Indications: HEADACHE DISORDER       Allergies   Allergen Reactions    Ace Inhibitors Cough    Codeine Nausea and Vomiting       Family History   Problem Relation Age of Onset    Cancer Mother         lung    Hypertension Mother     Cancer Brother         stomach    Hypertension Brother     Other Father         accidental death when pt was a little girl    Diabetes Brother      Social History     Tobacco Use    Smoking status: Never Smoker    Smokeless tobacco: Never Used   Substance Use Topics    Alcohol use: No       Depression Risk Factor Screening:     3 most recent PHQ Screens 1/20/2020   Little interest or pleasure in doing things Not at all   Feeling down, depressed, irritable, or hopeless Not at all   Total Score PHQ 2 0       Alcohol Risk Factor Screening:   Do you average 1 drink per night or more than 7 drinks a week:  No    On any one occasion in the past three months have you have had more than 3 drinks containing alcohol:  No      Functional Ability and Level of Safety:   Hearing: Hearing is good. Activities of Daily Living: The home contains: no safety equipment. Patient needs help with:  transportation, shopping, preparing meals, laundry, housework, managing medications and managing money    Ambulation: with no difficulty    Fall Risk:  Fall Risk Assessment, last 12 mths 1/20/2020   Able to walk? Yes   Fall in past 12 months? No       Abuse Screen:  Patient is not abused    Cognitive Screening   Has your family/caregiver stated any concerns about your memory: yes - short term memory loss is gradually worsening.    Cognitive Screening: Abnormal - Verbal Fluency Test    Patient Care Team   Patient Care Team:  Romina Abad MD as PCP - General (Internal Medicine)  Romina Abad MD as PCP - [facility] Provider  Kay Nolasco MD (Ophthalmology)  Courtney Paige DPM (Podiatry)  Reena Alvarez MD (Gastroenterology)  Tangela Valle MD (Urology)    Assessment/Plan   Education and counseling provided:  Are appropriate based on today's review and evaluation    Diagnoses and all orders for this visit:    1. Medicare annual wellness visit, subsequent    2.  Screening for depression  -     [place] Maintenance Due   Topic Date Due    DTaP/Tdap/Td series (1 - Tdap) 01/16/1943    Shingrix Vaccine Age 50> (1 of 2) 01/16/1982    MEDICARE YEARLY EXAM  02/10/2019    FOOT EXAM Q1  05/10/2019

## 2020-01-21 ENCOUNTER — PATIENT MESSAGE (OUTPATIENT)
Dept: INTERNAL MEDICINE CLINIC | Age: 85
End: 2020-01-21

## 2020-01-22 RX ORDER — METOPROLOL SUCCINATE 25 MG/1
25 TABLET, EXTENDED RELEASE ORAL DAILY
Qty: 30 TAB | Refills: 2 | Status: SHIPPED | OUTPATIENT
Start: 2020-01-22 | End: 2020-04-16 | Stop reason: SDUPTHER

## 2020-01-28 ENCOUNTER — TELEPHONE (OUTPATIENT)
Dept: INTERNAL MEDICINE CLINIC | Age: 85
End: 2020-01-28

## 2020-01-28 NOTE — TELEPHONE ENCOUNTER
Spoke with Estevan Maxwell ([facility])  Two pt identifiers confirmed. Lisette advised that the metoprolol was sent in for the patient in regards to a [employer] message that our office received regarding patients blood pressure being extremely elevated. Discussed the message that was sent regarding the medication. Lisette verbalized understanding of information discussed w/ no further questions at this time.

## 2020-01-28 NOTE — TELEPHONE ENCOUNTER
#[phone] Titi Cuadra is asking for a call as she needs to know why pt is taking the medication she picked up this morning, metoprolol. She can't remember pt getting this before. Please call daughter to discuss.

## 2020-03-10 DIAGNOSIS — E11.9 CONTROLLED TYPE 2 DIABETES MELLITUS WITHOUT COMPLICATION, WITHOUT LONG-TERM CURRENT USE OF INSULIN (HCC): Chronic | ICD-10-CM

## 2020-03-10 RX ORDER — METFORMIN HYDROCHLORIDE 500 MG/1
TABLET ORAL
Qty: 180 TAB | Refills: 3 | Status: SHIPPED | OUTPATIENT
Start: 2020-03-10 | End: 2021-02-07

## 2020-04-09 ENCOUNTER — VIRTUAL VISIT (OUTPATIENT)
Dept: INTERNAL MEDICINE CLINIC | Age: 85
End: 2020-04-09

## 2020-04-09 NOTE — PROGRESS NOTES
.  A user error has taken place: encounter opened in error, closed for administrative reasons. Patient's daughter rescheduled.

## 2020-04-16 ENCOUNTER — VIRTUAL VISIT (OUTPATIENT)
Dept: INTERNAL MEDICINE CLINIC | Age: 85
End: 2020-04-16

## 2020-04-16 DIAGNOSIS — E11.21 TYPE 2 DIABETES MELLITUS WITH NEPHROPATHY (HCC): ICD-10-CM

## 2020-04-16 DIAGNOSIS — E78.00 PURE HYPERCHOLESTEROLEMIA: ICD-10-CM

## 2020-04-16 DIAGNOSIS — I10 ESSENTIAL HYPERTENSION: Primary | ICD-10-CM

## 2020-04-16 RX ORDER — AMLODIPINE BESYLATE 5 MG/1
5 TABLET ORAL DAILY
Qty: 90 TAB | Refills: 3 | Status: SHIPPED | OUTPATIENT
Start: 2020-04-16 | End: 2021-03-19

## 2020-04-16 RX ORDER — METOPROLOL SUCCINATE 25 MG/1
25 TABLET, EXTENDED RELEASE ORAL DAILY
Qty: 90 TAB | Refills: 3 | Status: SHIPPED | OUTPATIENT
Start: 2020-04-16 | End: 2021-03-25

## 2020-04-16 RX ORDER — PRAVASTATIN SODIUM 40 MG/1
TABLET ORAL
Qty: 90 TAB | Refills: 3 | Status: SHIPPED | OUTPATIENT
Start: 2020-04-16 | End: 2021-07-04

## 2020-04-16 NOTE — PROGRESS NOTES
Sal Schrader is a 80 y.o. female who presents for followup. Presents with daughter. Alex Kaye with other daughter.      BP was elevated. Added metoprolol XL 25mg daily in January. On amlodipine, taking 5mg once a day, was supposed to be 10mg. No swelling. /73. Recheck 115/71.       On statin, no  Myalgias.  LDL 70 in August 2018.      On metformin 500mg BID, no GI side effects.  Follows diabetic diet.  Weight stable.  Hba1C 6.1% in November 2019. Elevated microalbumin. Creatinine 1.24.      Sees eye doctor every 6 months, glaucoma.           This is an established visit conducted via telemedicine with video. The patient has been instructed that this meets HIPAA criteria and acknowledges and agrees to this method of visitation. Pursuant to the emergency declaration under the [address] [place] [address], [facility] waiver authority and the [employer] Act, this Virtual Visit was conducted, with patient's consent, to reduce the patient's risk of exposure to COVID-19 and provide continuity of care for an established patient. Services were provided through a video synchronous discussion virtually to substitute for in-person clinic visit.         Past Medical History:   Diagnosis Date    DM Renal Manif Type II 2/11/2010    GERD (gastroesophageal reflux disease) 2/11/2010    Glaucoma, open angle 10/29/2010    Headache     HTN (hypertension) 2/11/2010    Hypercholesterolemia     Mixed hyperlipidemia 2/11/2010    Type II or unspecified type diabetes mellitus with renal manifestations, not stated as uncontrolled(250.40) ([facility] [address] 2/11/2010       Family History   Problem Relation Age of Onset    Cancer Mother         lung    Hypertension Mother     Cancer Brother         stomach    Hypertension Brother     Other Father         accidental death when pt was a little girl    Diabetes Brother        Social History     Socioeconomic History    Marital status:      Spouse name: Not on file    Number of children: Not on file    Years of education: Not on file    Highest education level: Not on file   Occupational History    Not on file   Social Needs    Financial resource strain: Not on file    Food insecurity     Worry: Not on file     Inability: Not on file    Transportation needs     Medical: Not on file     Non-medical: Not on file   Tobacco Use    Smoking status: Never Smoker    Smokeless tobacco: Never Used   Substance and Sexual Activity    Alcohol use: No    Drug use: No    Sexual activity: Not Currently     Partners: Male     Birth control/protection: None   Lifestyle    Physical activity     Days per week: Not on file     Minutes per session: Not on file    Stress: Not on file   Relationships    Social connections     Talks on phone: Not on file     Gets together: Not on file     Attends Denominational service: Not on file     Active member of club or organization: Not on file     Attends meetings of clubs or organizations: Not on file     Relationship status: Not on file    Intimate partner violence     Fear of current or ex partner: Not on file     Emotionally abused: Not on file     Physically abused: Not on file     Forced sexual activity: Not on file   Other Topics Concern    Not on file   Social History Narrative    Not on file       Current Outpatient Medications on File Prior to Visit   Medication Sig Dispense Refill    metFORMIN (GLUCOPHAGE) 500 mg tablet TAKE 1 TABLET BY MOUTH TWICE A DAY WITH MEALS 180 Tab 3    aspirin delayed-release 81 mg tablet Take 81 mg by mouth daily. Taking every other day  Indications: myocardial infarction prevention      acetaminophen (TYLENOL) 325 mg tablet Take 325-650 mg by mouth every six (6) hours as needed for Pain. Indications: HEADACHE DISORDER      [DISCONTINUED] metoprolol succinate (TOPROL-XL) 25 mg XL tablet Take 1 Tab by mouth daily.  30 Tab 2    cholecalciferol, vitamin D3, (VITAMIN D3) 2,000 unit tab Take  by mouth.  [DISCONTINUED] amLODIPine (NORVASC) 10 mg tablet Take 1 Tab by mouth daily. 90 Tab 3    [DISCONTINUED] pravastatin (PRAVACHOL) 40 mg tablet TAKE 1 TABLET BY MOUTH AT BEDTIME 90 Tab 1    latanoprost (XALATAN) 0.005 % ophthalmic solution        No current facility-administered medications on file prior to visit. Review of Systems  Pertinent items are noted in HPI. Objective:     Gen: well appearing female  HEENT: normal conjunctiva, no audible congestion, patient does not see oral erythema, has MMM  Neck: patient does not feel enlarged or tender LAD or masses  Resp: normal respiratory effort, no audible wheezing. CV: patient does not feel palpitations or heart irregularity  Abd: patient does not feel abdominal tenderness or mass, patient does not notice distension  Extrem: patient does not see swelling in ankles or joints. Neuro: Alert and oriented, able to answer questions without difficulty, able to move all extremities and walk normally        Assessment/Plan:       ICD-10-CM ICD-9-CM    1. Essential hypertension I10 401.9 amLODIPine (NORVASC) 5 mg tablet      metoprolol succinate (TOPROL-XL) 25 mg XL tablet   2. Pure hypercholesterolemia E78.00 272.0 pravastatin (PRAVACHOL) 40 mg tablet   3. Type 2 diabetes mellitus with nephropathy (HCC) E11.21 250.40      583.81    continue amlodipine 5mg and metoprolol 25mg. Continue to monitor BP. This was a telemedicine visit with video. Palomo Eden MD    Follow-up and Dispositions    · Return in about 3 months (around 7/16/2020) for follow up on medication.

## 2020-07-06 ENCOUNTER — OFFICE VISIT (OUTPATIENT)
Dept: INTERNAL MEDICINE CLINIC | Age: 85
End: 2020-07-06

## 2020-07-06 VITALS
DIASTOLIC BLOOD PRESSURE: 73 MMHG | RESPIRATION RATE: 16 BRPM | BODY MASS INDEX: 29.32 KG/M2 | SYSTOLIC BLOOD PRESSURE: 156 MMHG | WEIGHT: 182.4 LBS | HEART RATE: 78 BPM | HEIGHT: 66 IN | OXYGEN SATURATION: 97 % | TEMPERATURE: 97.3 F

## 2020-07-06 DIAGNOSIS — I10 ESSENTIAL HYPERTENSION: Primary | ICD-10-CM

## 2020-07-06 DIAGNOSIS — R41.3 MEMORY LOSS: ICD-10-CM

## 2020-07-06 DIAGNOSIS — E78.00 PURE HYPERCHOLESTEROLEMIA: ICD-10-CM

## 2020-07-06 DIAGNOSIS — E11.21 TYPE 2 DIABETES MELLITUS WITH NEPHROPATHY (HCC): ICD-10-CM

## 2020-07-06 RX ORDER — METFORMIN HYDROCHLORIDE 500 MG/1
TABLET ORAL
Qty: 180 TAB | Refills: 3 | Status: CANCELLED | OUTPATIENT
Start: 2020-07-06

## 2020-07-06 RX ORDER — PRAVASTATIN SODIUM 40 MG/1
TABLET ORAL
Qty: 90 TAB | Refills: 3 | Status: CANCELLED | OUTPATIENT
Start: 2020-07-06

## 2020-07-06 RX ORDER — METOPROLOL SUCCINATE 25 MG/1
25 TABLET, EXTENDED RELEASE ORAL DAILY
Qty: 90 TAB | Refills: 3 | Status: CANCELLED | OUTPATIENT
Start: 2020-07-06

## 2020-07-06 RX ORDER — AMLODIPINE BESYLATE 5 MG/1
5 TABLET ORAL DAILY
Qty: 90 TAB | Refills: 3 | Status: CANCELLED | OUTPATIENT
Start: 2020-07-06

## 2020-07-06 RX ORDER — CHOLECALCIFEROL (VITAMIN D3) 125 MCG
CAPSULE ORAL
Status: CANCELLED | OUTPATIENT
Start: 2020-07-06

## 2020-07-06 RX ORDER — DONEPEZIL HYDROCHLORIDE 5 MG/1
5 TABLET, FILM COATED ORAL
Qty: 90 TAB | Refills: 3 | Status: SHIPPED | OUTPATIENT
Start: 2020-07-06 | End: 2021-06-21

## 2020-07-06 NOTE — PROGRESS NOTES
Mary Lou Lockhart is a 80 y.o. female who presents accompanied by daughter. Patient has no complaints. Patient is inactive. Up at 8am, reports she sits around. Straightens her own room. Does not cook. Denies pain. No otc medications. Seen by virtual visit on April 9 and April 16. Treated for HTN. On amlodipine 5mg once a day and metoprolol XL 25mg daily. BP at home, 140's. Treated for HLP. On statin. No myalgias. Diabetic. On metformin 500mg BID, no GI side effects.  Follows diabetic diet.  Weight stable.  Hba1C 6.1% in November 2019. Up to date on eye exam. Has glaucoma, every 6 months. On drops. She is wearing depends at night, still wets bed. Wears depends in daytime too, fewer accidents. Is getting up and will be confused. She is afraid to be alone. Watches TV. Tried puzzles, not reading now. Does not have mammogram or DEXA scan.         Past Medical History:   Diagnosis Date    DM Renal Manif Type II 2/11/2010    GERD (gastroesophageal reflux disease) 2/11/2010    Glaucoma, open angle 10/29/2010    Headache     HTN (hypertension) 2/11/2010    Hypercholesterolemia     Mixed hyperlipidemia 2/11/2010    Type II or unspecified type diabetes mellitus with renal manifestations, not stated as uncontrolled(250.40) ([facility] [address] 2/11/2010       Family History   Problem Relation Age of Onset    Cancer Mother         lung    Hypertension Mother     Cancer Brother         stomach    Hypertension Brother     Other Father         accidental death when pt was a little girl    Diabetes Brother        Social History     Socioeconomic History    Marital status:      Spouse name: Not on file    Number of children: Not on file    Years of education: Not on file    Highest education level: Not on file   Occupational History    Not on file   Social Needs    Financial resource strain: Not on file    Food insecurity     Worry: Not on file     Inability: Not on file   Johanna Narayan Transportation needs     Medical: Not on file     Non-medical: Not on file   Tobacco Use    Smoking status: Never Smoker    Smokeless tobacco: Never Used   Substance and Sexual Activity    Alcohol use: No    Drug use: No    Sexual activity: Not Currently     Partners: Male     Birth control/protection: None   Lifestyle    Physical activity     Days per week: Not on file     Minutes per session: Not on file    Stress: Not on file   Relationships    Social connections     Talks on phone: Not on file     Gets together: Not on file     Attends Nondenominational service: Not on file     Active member of club or organization: Not on file     Attends meetings of clubs or organizations: Not on file     Relationship status: Not on file    Intimate partner violence     Fear of current or ex partner: Not on file     Emotionally abused: Not on file     Physically abused: Not on file     Forced sexual activity: Not on file   Other Topics Concern    Not on file   Social History Narrative    Not on file       Current Outpatient Medications on File Prior to Visit   Medication Sig Dispense Refill    amLODIPine (NORVASC) 5 mg tablet Take 1 Tab by mouth daily. 90 Tab 3    metoprolol succinate (TOPROL-XL) 25 mg XL tablet Take 1 Tab by mouth daily. 90 Tab 3    pravastatin (PRAVACHOL) 40 mg tablet TAKE 1 TABLET BY MOUTH AT BEDTIME 90 Tab 3    metFORMIN (GLUCOPHAGE) 500 mg tablet TAKE 1 TABLET BY MOUTH TWICE A DAY WITH MEALS 180 Tab 3    cholecalciferol, vitamin D3, (VITAMIN D3) 2,000 unit tab Take  by mouth.  latanoprost (XALATAN) 0.005 % ophthalmic solution       aspirin delayed-release 81 mg tablet Take 81 mg by mouth daily. Taking every other day  Indications: myocardial infarction prevention      acetaminophen (TYLENOL) 325 mg tablet Take 325-650 mg by mouth every six (6) hours as needed for Pain. Indications: HEADACHE DISORDER       No current facility-administered medications on file prior to visit.         Review of Systems  Pertinent items are noted in HPI. Objective:     Visit Vitals  /73 (BP 1 Location: Left arm, BP Patient Position: Sitting)   Pulse 78   Temp 97.3 °F (36.3 °C) (Temporal)   Resp 16   Ht 5' 6\" (1.676 m)   Wt 182 lb 6.4 oz (82.7 kg)   SpO2 97%   BMI 29.44 kg/m²     Gen: well appearing female  HEENT:   PERRL,normal conjunctiva. External ear and canals normal, TMs no opacification or erythema,  OP no erythema, no exudates, MMM  Neck:  Thyroid normal size, nontender, without nodules. No masses or LAD  Resp:  No wheezing, no rhonchi, no rales. CV:  RRR, normal S1S2, no murmur. GI: soft, nontender, without masses. Extrem:  +2 pulses, no edema, warm distally  Neuro: alert, cooperative. Year- \"I don't know\". Place- \"for doctor care\". Season- \"spring\". President- \"can't be [employer]. \"        Assessment/Plan:       ICD-10-CM ICD-9-CM    1. Essential hypertension Q70 147.9 METABOLIC PANEL, COMPREHENSIVE   2. Pure hypercholesterolemia E78.00 272.0 LIPID PANEL   3. Type 2 diabetes mellitus with nephropathy (HCC) E11.21 250.40 HEMOGLOBIN A1C W/O EAG     583.81    4. Controlled type 2 diabetes mellitus without complication, without long-term current use of insulin (HCC) E11.9 250.00    5. Memory loss R41.3 780.93 donepeziL (ARICEPT) 5 mg tablet     Start aricept 5mg once a day.         Johnny Monge MD

## 2020-07-07 LAB
ALBUMIN SERPL-MCNC: 4.2 G/DL (ref 3.6–4.6)
ALBUMIN/GLOB SERPL: 1.2 {RATIO} (ref 1.2–2.2)
ALP SERPL-CCNC: 71 IU/L (ref 39–117)
ALT SERPL-CCNC: 9 IU/L (ref 0–32)
AST SERPL-CCNC: 13 IU/L (ref 0–40)
BILIRUB SERPL-MCNC: 0.3 MG/DL (ref 0–1.2)
BUN SERPL-MCNC: 25 MG/DL (ref 8–27)
BUN/CREAT SERPL: 24 (ref 12–28)
CALCIUM SERPL-MCNC: 9.4 MG/DL (ref 8.7–10.3)
CHLORIDE SERPL-SCNC: 106 MMOL/L (ref 96–106)
CHOLEST SERPL-MCNC: 171 MG/DL (ref 100–199)
CO2 SERPL-SCNC: 26 MMOL/L (ref 20–29)
CREAT SERPL-MCNC: 1.04 MG/DL (ref 0.57–1)
GLOBULIN SER CALC-MCNC: 3.5 G/DL (ref 1.5–4.5)
GLUCOSE SERPL-MCNC: 118 MG/DL (ref 65–99)
HBA1C MFR BLD: 6.1 % (ref 4.8–5.6)
HDLC SERPL-MCNC: 73 MG/DL
LDLC SERPL CALC-MCNC: 87 MG/DL (ref 0–99)
POTASSIUM SERPL-SCNC: 4 MMOL/L (ref 3.5–5.2)
PROT SERPL-MCNC: 7.7 G/DL (ref 6–8.5)
SODIUM SERPL-SCNC: 144 MMOL/L (ref 134–144)
TRIGL SERPL-MCNC: 53 MG/DL (ref 0–149)
TSH SERPL DL<=0.005 MIU/L-ACNC: 4.1 UIU/ML (ref 0.45–4.5)
VIT B12 SERPL-MCNC: 347 PG/ML (ref 232–1245)
VLDLC SERPL CALC-MCNC: 11 MG/DL (ref 5–40)

## 2020-07-10 ENCOUNTER — PATIENT MESSAGE (OUTPATIENT)
Dept: INTERNAL MEDICINE CLINIC | Age: 85
End: 2020-07-10

## 2020-09-30 ENCOUNTER — TELEPHONE (OUTPATIENT)
Dept: INTERNAL MEDICINE CLINIC | Age: 85
End: 2020-09-30

## 2020-09-30 NOTE — TELEPHONE ENCOUNTER
Spoke with patients daughter Josias Servin. Two pt identifiers confirmed. Lisette advised that patients appointment on 10/06/2020 can not be changed for virtual to in office. Lisette advised that Dr. Rony Bruno only sees patients in the office on Monday and Wednesday. Anisha Kingston that she can try OTC debrox for wax buildup in patients ears and that does not help, we can schedule a nurse visit to irrigate patients ears. Lisette verbalized understanding of information discussed w/ no further questions at this time.

## 2020-09-30 NOTE — TELEPHONE ENCOUNTER
----- Message from Jaskaran Escobar sent at 9/30/2020 10:52 AM EDT -----  Regarding: Dr. Peri Lagos first and last name: Sugar Hoffman (daughter)  Reason for call: Requesting a call back to see if 10/6/20 appt can be converted from a virtual to an in office visit so that patient can have her ears cleaned.   Callback required yes/no and why: yes  Best contact number(s): [phone]  Details to clarify the request: n/a

## 2020-10-06 ENCOUNTER — VIRTUAL VISIT (OUTPATIENT)
Dept: INTERNAL MEDICINE CLINIC | Age: 85
End: 2020-10-06
Payer: MEDICARE

## 2020-10-06 DIAGNOSIS — F02.80 ALZHEIMER'S DEMENTIA WITHOUT BEHAVIORAL DISTURBANCE, UNSPECIFIED TIMING OF DEMENTIA ONSET: Primary | ICD-10-CM

## 2020-10-06 DIAGNOSIS — E78.00 PURE HYPERCHOLESTEROLEMIA: ICD-10-CM

## 2020-10-06 DIAGNOSIS — G30.9 ALZHEIMER'S DEMENTIA WITHOUT BEHAVIORAL DISTURBANCE, UNSPECIFIED TIMING OF DEMENTIA ONSET: Primary | ICD-10-CM

## 2020-10-06 DIAGNOSIS — H40.9 GLAUCOMA, UNSPECIFIED GLAUCOMA TYPE, UNSPECIFIED LATERALITY: ICD-10-CM

## 2020-10-06 DIAGNOSIS — I10 ESSENTIAL HYPERTENSION: ICD-10-CM

## 2020-10-06 DIAGNOSIS — E11.21 TYPE 2 DIABETES MELLITUS WITH NEPHROPATHY (HCC): ICD-10-CM

## 2020-10-06 PROCEDURE — G8417 CALC BMI ABV UP PARAM F/U: HCPCS | Performed by: FAMILY MEDICINE

## 2020-10-06 PROCEDURE — G8432 DEP SCR NOT DOC, RNG: HCPCS | Performed by: FAMILY MEDICINE

## 2020-10-06 PROCEDURE — 1090F PRES/ABSN URINE INCON ASSESS: CPT | Performed by: FAMILY MEDICINE

## 2020-10-06 PROCEDURE — G8536 NO DOC ELDER MAL SCRN: HCPCS | Performed by: FAMILY MEDICINE

## 2020-10-06 PROCEDURE — 1101F PT FALLS ASSESS-DOCD LE1/YR: CPT | Performed by: FAMILY MEDICINE

## 2020-10-06 PROCEDURE — G8427 DOCREV CUR MEDS BY ELIG CLIN: HCPCS | Performed by: FAMILY MEDICINE

## 2020-10-06 PROCEDURE — 99213 OFFICE O/P EST LOW 20 MIN: CPT | Performed by: FAMILY MEDICINE

## 2020-10-06 NOTE — PROGRESS NOTES
Chadwick Burton is a 80 y.o. female who presents for follow up on memory loss. Seen in office on July 6 with daughter. Started on aricept 5mg once a day. Family stopped the medication. They felt it made her drowsy. Since off medication she is more alert. Patient is inactive. Foods are prepared, has to be coached to eat. She is wearing depends. Will go to bathroom during the lidia her own. Normal BM. No skin breakdown. Not up at night. No falls.       Treated for HTN. On amlodipine 5mg once a day and metoprolol XL 25mg daily. BP at home, 140's.       Treated for HLP. On statin. No myalgias.       Diabetic. On metformin 500mg BID, no GI side effects.  Follows diabetic diet.  Weight stable.  Hba1C 6.1% in November 2019. Up to date on eye exam. Has glaucoma, every 6 months. On drops.          This is an established visit conducted via telemedicine with video. The patient has been instructed that this meets HIPAA criteria and acknowledges and agrees to this method of visitation. Pursuant to the emergency declaration under the [facility] waiver authority and the [employer] Act, this Virtual Visit was conducted, with patient's consent, to reduce the patient's risk of exposure to COVID-19 and provide continuity of care for an established patient. Services were provided through a video synchronous discussion virtually to substitute for in-person clinic visit.         Past Medical History:   Diagnosis Date    DM Renal Manif Type II 2/11/2010    GERD (gastroesophageal reflux disease) 2/11/2010    Glaucoma, open angle 10/29/2010    Headache     HTN (hypertension) 2/11/2010    Hypercholesterolemia     Mixed hyperlipidemia 2/11/2010    Type II or unspecified type diabetes mellitus with renal manifestations, not stated as uncontrolled(250.40) ([facility] [address] 2/11/2010       Family History   Problem Relation Age of Onset    Cancer Mother         lung    Hypertension Mother     Cancer Brother         stomach    Hypertension Brother     Other Father         accidental death when pt was a little girl    Diabetes Brother        Social History     Socioeconomic History    Marital status:      Spouse name: Not on file    Number of children: Not on file    Years of education: Not on file    Highest education level: Not on file   Occupational History    Not on file   Social Needs    Financial resource strain: Not on file    Food insecurity     Worry: Not on file     Inability: Not on file   [employer] needs     Medical: Not on file     Non-medical: Not on file   Tobacco Use    Smoking status: Never Smoker    Smokeless tobacco: Never Used   Substance and Sexual Activity    Alcohol use: No    Drug use: No    Sexual activity: Not Currently     Partners: Male     Birth control/protection: None   Lifestyle    Physical activity     Days per week: Not on file     Minutes per session: Not on file    Stress: Not on file   Relationships    Social connections     Talks on phone: Not on file     Gets together: Not on file     Attends [employer] service: Not on file     Active member of club or organization: Not on file     Attends meetings of clubs or organizations: Not on file     Relationship status: Not on file    Intimate partner violence     Fear of current or ex partner: Not on file     Emotionally abused: Not on file     Physically abused: Not on file     Forced sexual activity: Not on file   Other Topics Concern    Not on file   Social History Narrative    Not on file       Current Outpatient Medications on File Prior to Visit   Medication Sig Dispense Refill    amLODIPine (NORVASC) 5 mg tablet Take 1 Tab by mouth daily. 90 Tab 3    metoprolol succinate (TOPROL-XL) 25 mg XL tablet Take 1 Tab by mouth daily.  90 Tab 3    pravastatin (PRAVACHOL) 40 mg tablet TAKE 1 TABLET BY MOUTH AT BEDTIME 90 Tab 3  metFORMIN (GLUCOPHAGE) 500 mg tablet TAKE 1 TABLET BY MOUTH TWICE A DAY WITH MEALS 180 Tab 3    cholecalciferol, vitamin D3, (VITAMIN D3) 2,000 unit tab Take  by mouth.  latanoprost (XALATAN) 0.005 % ophthalmic solution       aspirin delayed-release 81 mg tablet Take 81 mg by mouth daily. Taking every other day  Indications: myocardial infarction prevention      acetaminophen (TYLENOL) 325 mg tablet Take 325-650 mg by mouth every six (6) hours as needed for Pain. Indications: HEADACHE DISORDER      donepeziL (ARICEPT) 5 mg tablet Take 1 Tab by mouth nightly. 90 Tab 3     No current facility-administered medications on file prior to visit. Review of Systems  Pertinent items are noted in HPI. Objective:   Per daughter. Gen: well appearing female  HEENT: normal conjunctiva, no audible congestion, patient does not see oral erythema, has MMM  Neck: patient does not feel enlarged or tender LAD or masses  Resp: normal respiratory effort, no audible wheezing. CV: patient does not feel palpitations or heart irregularity  Abd: patient does not feel abdominal tenderness or mass, patient does not notice distension  Extrem: patient does not see swelling in ankles or joints. Neuro: Alert, cognitive deficits. Assessment/Plan:       ICD-10-CM ICD-9-CM    1. Alzheimer's dementia without behavioral disturbance, unspecified timing of [facility]  G30.9 331.0     F02.80 294.10    2. Essential hypertension  I10 401.9    3. Pure hypercholesterolemia  E78.00 272.0    4. Type 2 diabetes mellitus with nephropathy (HCC)  E11.21 250.40      583.81    5. Glaucoma, unspecified glaucoma type, unspecified laterality  H40.9 365.9    to get high dose flu shot and return for ear cleaning. Family has decided to not resume memory medication at this time. This was a telemedicine visit with video.         Jose Matthew MD    Follow-up and Dispositions    · Return in about 3 months (around 1/6/2021) for follow up in office. Collette Ruts

## 2020-10-13 ENCOUNTER — CLINICAL SUPPORT (OUTPATIENT)
Dept: INTERNAL MEDICINE CLINIC | Age: 85
End: 2020-10-13
Payer: MEDICARE

## 2020-10-13 VITALS
HEIGHT: 66 IN | HEART RATE: 76 BPM | OXYGEN SATURATION: 98 % | SYSTOLIC BLOOD PRESSURE: 163 MMHG | TEMPERATURE: 96.6 F | WEIGHT: 182 LBS | DIASTOLIC BLOOD PRESSURE: 73 MMHG | BODY MASS INDEX: 29.25 KG/M2 | RESPIRATION RATE: 16 BRPM

## 2020-10-13 DIAGNOSIS — Z23 NEEDS FLU SHOT: Primary | ICD-10-CM

## 2020-10-13 PROCEDURE — G0008 ADMIN INFLUENZA VIRUS VAC: HCPCS | Performed by: FAMILY MEDICINE

## 2020-10-13 PROCEDURE — 90694 VACC AIIV4 NO PRSRV 0.5ML IM: CPT

## 2020-10-13 NOTE — PROGRESS NOTES
Chief Complaint   Patient presents with    Immunization/Injection     65+ flu shot    Wax in Ear     Bilateral ear wax remove     Visit Vitals  BP (!) 163/73 (BP 1 Location: Right arm, BP Patient Position: Sitting)   Pulse 76   Temp (!) 96.6 °F (35.9 °C) (Temporal)   Resp 16   Ht 5' 6\" (1.676 m)   Wt 182 lb (82.6 kg)   SpO2 98%   BMI 29.38 kg/m²     After obtaining consent, and per verbal order from Dr. Mason Bales, patient received influenza vaccine given by Tariq Haley LPN in Left Deltoid. Influenza Vaccine 0.5 mL IM now. Patient was observed for 10 minutes post injection. Patient tolerated injection well.

## 2020-11-03 NOTE — PROGRESS NOTES
Sarah Barker is a 80 y.o. female who presents for follow up. presents with daughter. Lives with other daughter.       Per daughter, not checking BP at home since the monitor hurts her arm. On amlodipine 5mg once a day, prior verapamil. Trying to follow low salt diet. Per daughter, lower appetite. No significant swelling. No headaches. No dizziness. No falls. Off dyazide is better. Creatinine 1. 19. Drinking water 2 glasses a day.       On statin, no  Myalgias. LDL 70 in August 2018.      On metformin 500mg BID, no GI side effects. Follows diabetic diet. Weight stable. Hba1C 6.5% in December 2018.  needs to see podiatry.       Sees eye doctor every 6 months, glaucoma.             Past Medical History:   Diagnosis Date    DM Renal Manif Type II 2/11/2010    GERD (gastroesophageal reflux disease) 2/11/2010    Glaucoma, open angle 10/29/2010    Headache     HTN (hypertension) 2/11/2010    Hypercholesterolemia     Mixed hyperlipidemia 2/11/2010    Type II or unspecified type diabetes mellitus with renal manifestations, not stated as uncontrolled(250.40) ([facility] [address] 2/11/2010       Family History   Problem Relation Age of Onset    Cancer Mother         lung    Hypertension Mother     Cancer Brother         stomach    Hypertension Brother     Other Father         accidental death when pt was a little girl    Diabetes Brother        Social History     Socioeconomic History    Marital status:      Spouse name: Not on file    Number of children: Not on file    Years of education: Not on file    Highest education level: Not on file   Occupational History    Not on file   Social Needs    Financial resource strain: Not on file    Food insecurity:     Worry: Not on file     Inability: Not on file    Transportation needs:     Medical: Not on file     Non-medical: Not on file   Tobacco Use    Smoking status: Never Smoker    Smokeless tobacco: Never Used   Substance and Sexual Activity    Alcohol use: No    Drug use: No    Sexual activity: Not Currently     Partners: Male     Birth control/protection: None   Lifestyle    Physical activity:     Days per week: Not on file     Minutes per session: Not on file    Stress: Not on file   Relationships    Social connections:     Talks on phone: Not on file     Gets together: Not on file     Attends Mu-ism service: Not on file     Active member of club or organization: Not on file     Attends meetings of clubs or organizations: Not on file     Relationship status: Not on file    Intimate partner violence:     Fear of current or ex partner: Not on file     Emotionally abused: Not on file     Physically abused: Not on file     Forced sexual activity: Not on file   Other Topics Concern    Not on file   Social History Narrative    Not on file       Current Outpatient Medications on File Prior to Visit   Medication Sig Dispense Refill    cholecalciferol, vitamin D3, (VITAMIN D3) 2,000 unit tab Take  by mouth.  pravastatin (PRAVACHOL) 40 mg tablet TAKE 1 TABLET BY MOUTH AT BEDTIME 90 Tab 1    metFORMIN (GLUCOPHAGE) 500 mg tablet Take 1 Tab by mouth two (2) times daily (with meals). 180 Tab 3    latanoprost (XALATAN) 0.005 % ophthalmic solution       aspirin delayed-release 81 mg tablet Take 81 mg by mouth daily. Taking every other day  Indications: myocardial infarction prevention      acetaminophen (TYLENOL) 325 mg tablet Take 325-650 mg by mouth every six (6) hours as needed for Pain. Indications: HEADACHE DISORDER       No current facility-administered medications on file prior to visit. Review of Systems  Pertinent items are noted in HPI. Objective:     Visit Vitals  /72   Pulse 84   Temp 98.9 °F (37.2 °C) (Oral)   Resp 16   Ht 5' 6\" (1.676 m)   Wt 194 lb 9.6 oz (88.3 kg)   SpO2 98%   BMI 31.41 kg/m²     Gen: well appearing female  HEENT:   PERRL,normal conjunctiva.  External ear and canals normal, TMs no opacification or erythema,  OP no erythema, no exudates, MMM  Neck:   No masses or LAD  Resp:  No wheezing, no rhonchi, no rales. CV:  RRR, normal N5Q1, 3/6 systolic murmur. GI: soft, nontender, without masses. Extrem:  +2 pulses, no edema, warm distally, onychomycosis. Assessment/Plan:       ICD-10-CM ICD-9-CM    1. Essential hypertension J27 297.9 METABOLIC PANEL, COMPREHENSIVE      CBC W/O DIFF      amLODIPine (NORVASC) 10 mg tablet   2. Encounter for immunization Z23 V03.89 INFLUENZA VACCINE INACTIVATED (IIV), SUBUNIT, ADJUVANTED, IM      ADMIN INFLUENZA VIRUS VAC   3. Controlled type 2 diabetes mellitus without complication, without long-term current use of insulin (HCC) E11.9 250.00 MICROALBUMIN, UR, RAND W/ MICROALB/CREAT RATIO      HEMOGLOBIN A1C W/O EAG      REFERRAL TO PODIATRY   4. Pure hypercholesterolemia E78.00 272.0 LIPID PANEL   5. Type 2 diabetes mellitus with nephropathy (HCC) E11.21 250.40      583.81    6. Onychomycosis B35.1 110.1 REFERRAL TO PODIATRY   7. Nocturia R35.1 788.43 AMB SUPPLY ORDER     INCREASE THE AMLODIPINE TO 2 OF THE 5MG ONCE A DAY. NEW RX FOR 10MG TABLET ONCE A DAY. Follow-up and Dispositions    · Return in about 8 weeks (around 1/14/2020) for follow up pending labs and on HTN in 8 weeks. Aidee Ramos MD    Discussed the patient's BMI with her. The BMI follow up plan is as follows:     dietary management education, guidance, and counseling  encourage exercise  monitor weight  prescribed dietary intake    An After Visit Summary was printed and given to the patient. Detail Level: Simple Post-Care Instructions: I reviewed with the patient in detail post-care instructions. Patient is to wear sunprotection, and avoid picking at any of the treated lesions. Pt may apply Vaseline to crusted or scabbing areas. Duration Of Freeze Thaw-Cycle (Seconds): 3 Render Note In Bullet Format When Appropriate: No Render Post-Care Instructions In Note?: yes Consent: The patient's consent was obtained including but not limited to risks of crusting, scabbing, blistering, scarring, darker or lighter pigmentary change, recurrence, incomplete removal and infection.

## 2021-02-01 ENCOUNTER — VIRTUAL VISIT (OUTPATIENT)
Dept: INTERNAL MEDICINE CLINIC | Age: 86
End: 2021-02-01
Payer: MEDICARE

## 2021-02-01 DIAGNOSIS — F41.9 ANXIETY: ICD-10-CM

## 2021-02-01 DIAGNOSIS — H40.10X0 OPEN-ANGLE GLAUCOMA, UNSPECIFIED GLAUCOMA STAGE, UNSPECIFIED LATERALITY, UNSPECIFIED OPEN-ANGLE GLAUCOMA TYPE: ICD-10-CM

## 2021-02-01 DIAGNOSIS — G30.9 ALZHEIMER'S DEMENTIA WITHOUT BEHAVIORAL DISTURBANCE, UNSPECIFIED TIMING OF DEMENTIA ONSET: ICD-10-CM

## 2021-02-01 DIAGNOSIS — E11.21 TYPE 2 DIABETES MELLITUS WITH NEPHROPATHY (HCC): Primary | ICD-10-CM

## 2021-02-01 DIAGNOSIS — N18.30 STAGE 3 CHRONIC KIDNEY DISEASE, UNSPECIFIED WHETHER STAGE 3A OR 3B CKD (HCC): ICD-10-CM

## 2021-02-01 DIAGNOSIS — E55.9 VITAMIN D DEFICIENCY: ICD-10-CM

## 2021-02-01 DIAGNOSIS — Z00.00 MEDICARE ANNUAL WELLNESS VISIT, SUBSEQUENT: ICD-10-CM

## 2021-02-01 DIAGNOSIS — Z13.31 SCREENING FOR DEPRESSION: ICD-10-CM

## 2021-02-01 DIAGNOSIS — E78.2 MIXED HYPERLIPIDEMIA: ICD-10-CM

## 2021-02-01 DIAGNOSIS — F02.80 ALZHEIMER'S DEMENTIA WITHOUT BEHAVIORAL DISTURBANCE, UNSPECIFIED TIMING OF DEMENTIA ONSET: ICD-10-CM

## 2021-02-01 PROCEDURE — 1101F PT FALLS ASSESS-DOCD LE1/YR: CPT | Performed by: FAMILY MEDICINE

## 2021-02-01 PROCEDURE — G8432 DEP SCR NOT DOC, RNG: HCPCS | Performed by: FAMILY MEDICINE

## 2021-02-01 PROCEDURE — G8536 NO DOC ELDER MAL SCRN: HCPCS | Performed by: FAMILY MEDICINE

## 2021-02-01 PROCEDURE — G8419 CALC BMI OUT NRM PARAM NOF/U: HCPCS | Performed by: FAMILY MEDICINE

## 2021-02-01 PROCEDURE — 1090F PRES/ABSN URINE INCON ASSESS: CPT | Performed by: FAMILY MEDICINE

## 2021-02-01 PROCEDURE — 99214 OFFICE O/P EST MOD 30 MIN: CPT | Performed by: FAMILY MEDICINE

## 2021-02-01 PROCEDURE — G0439 PPPS, SUBSEQ VISIT: HCPCS | Performed by: FAMILY MEDICINE

## 2021-02-01 PROCEDURE — G0463 HOSPITAL OUTPT CLINIC VISIT: HCPCS | Performed by: FAMILY MEDICINE

## 2021-02-01 PROCEDURE — G8427 DOCREV CUR MEDS BY ELIG CLIN: HCPCS | Performed by: FAMILY MEDICINE

## 2021-02-01 RX ORDER — SERTRALINE HYDROCHLORIDE 50 MG/1
50 TABLET, FILM COATED ORAL DAILY
Qty: 30 TAB | Refills: 2 | Status: SHIPPED | OUTPATIENT
Start: 2021-02-01 | End: 2021-03-02 | Stop reason: SDUPTHER

## 2021-02-01 NOTE — PROGRESS NOTES
This is the Subsequent Medicare Annual Wellness Exam, performed 12 months or more after the Initial AWV or the last Subsequent AWV    I have reviewed the patient's medical history in detail and updated the computerized patient record. Depression Risk Factor Screening:     3 most recent PHQ Screens 10/13/2020   Little interest or pleasure in doing things Not at all   Feeling down, depressed, irritable, or hopeless Not at all   Total Score PHQ 2 0       Alcohol Risk Screen    Do you average more than 1 drink per night or more than 7 drinks a week:  No    On any one occasion in the past three months have you have had more than 3 drinks containing alcohol:  No        Functional Ability and Level of Safety:    Hearing: The patient needs further evaluation. Activities of Daily Living: The home contains: no safety equipment. Patient needs help with:  phone, transportation, shopping, preparing meals, laundry, housework, managing medications, managing money, dressing, bathing, hygiene and bathroom needs      Ambulation: with mild difficulty     Fall Risk:  Fall Risk Assessment, last 12 mths 10/13/2020   Able to walk? Yes   Fall in past 12 months? No      Abuse Screen:  Patient is not abused       Cognitive Screening    Has your family/caregiver stated any concerns about your memory: yes - has dementia    Cognitive Screening: Abnormal - Verbal Fluency Test    Assessment/Plan   Education and counseling provided:  Are appropriate based on today's review and evaluation    Diagnoses and all orders for this visit:    1. Type 2 diabetes mellitus with nephropathy ([facility] [address]    2. Screening for depression  -     DEPRESSION SCREEN ANNUAL    3. Alzheimer's dementia without behavioral disturbance, unspecified timing of dementia onset ([facility] [address]    4. Stage 3 chronic kidney disease, unspecified whether stage 3a or 3b CKD    5. Vitamin D deficiency    6. Mixed hyperlipidemia    7.  Open-angle glaucoma, unspecified glaucoma stage, unspecified laterality, unspecified open-angle glaucoma type    8.  Medicare annual wellness visit, subsequent        Health Maintenance Due     Health Maintenance Due   Topic Date Due    COVID-19 Vaccine (1 of 2) 01/16/1948    DTaP/Tdap/Td series (1 - Tdap) 01/16/1953    Shingrix Vaccine Age 50> (1 of 2) 01/16/1982    Bone Densitometry (Dexa) Screening  01/16/1997    Foot Exam Q1  05/10/2019    MICROALBUMIN Q1  11/19/2020       Patient Care Team   Patient Care Team:  Bing Joseph MD as PCP - General (Internal Medicine)  Bing Joseph MD as PCP - [facility] Provider  Karlos Pendleton MD (Ophthalmology)  Julian Gunter DPM ([facility])  Sabine Ragsdale MD (Gastroenterology)  Dahlia Rendon MD (Urology)    History     Patient Active Problem List   Diagnosis Code    Diabetes mellitus type 2, controlled, without complications ([address] Y33.1    Mixed hyperlipidemia E78.2    GERD (gastroesophageal reflux disease) K21.9    Allergic rhinitis J30.9    Glaucoma, open angle H40.10X0    Alzheimer's dementia ([address] G30.9, F02.80    Edema of both legs R60.0    Unintentional weight loss D52.2    Systolic murmur D85.0    Essential hypertension, benign I10    ACE inhibitor intolerance Z78.9    Dementia without behavioral disturbance ([address] F03.90    Vitamin D deficiency E55.9    Type 2 diabetes mellitus with nephropathy ([address] E11.21    Renal insufficiency N28.9    CKD (chronic kidney disease) stage 3, GFR 30-59 ml/min N18.30     Past Medical History:   Diagnosis Date    DM Renal Manif Type II 2/11/2010    GERD (gastroesophageal reflux disease) 2/11/2010    Glaucoma, open angle 10/29/2010    Headache     HTN (hypertension) 2/11/2010    Hypercholesterolemia     Mixed hyperlipidemia 2/11/2010    Type II or unspecified type diabetes mellitus with renal manifestations, not stated as uncontrolled(250.40) ([address] 2/11/2010      Past Surgical History:   Procedure Laterality Date    HX COLONOSCOPY  10/5/2005    tics and int. hem; Dr. Subhash Webb; 10 year repeat    HX LARISSA AND BSO      [place] EGD TRANSORAL BIOPSY SINGLE/MULTIPLE  7/1/2013          Current Outpatient Medications   Medication Sig Dispense Refill    donepeziL (ARICEPT) 5 mg tablet Take 1 Tab by mouth nightly. 90 Tab 3    amLODIPine (NORVASC) 5 mg tablet Take 1 Tab by mouth daily. 90 Tab 3    metoprolol succinate (TOPROL-XL) 25 mg XL tablet Take 1 Tab by mouth daily. 90 Tab 3    pravastatin (PRAVACHOL) 40 mg tablet TAKE 1 TABLET BY MOUTH AT BEDTIME 90 Tab 3    metFORMIN (GLUCOPHAGE) 500 mg tablet TAKE 1 TABLET BY MOUTH TWICE A DAY WITH MEALS 180 Tab 3    cholecalciferol, vitamin D3, (VITAMIN D3) 2,000 unit tab Take  by mouth.  latanoprost (XALATAN) 0.005 % ophthalmic solution       aspirin delayed-release 81 mg tablet Take 81 mg by mouth daily. Taking every other day  Indications: myocardial infarction prevention      acetaminophen (TYLENOL) 325 mg tablet Take 325-650 mg by mouth every six (6) hours as needed for Pain. Indications: HEADACHE DISORDER       Allergies   Allergen Reactions    Ace Inhibitors Cough    Codeine Nausea and Vomiting       Family History   Problem Relation Age of Onset    Cancer Mother         lung    Hypertension Mother     Cancer Brother         stomach    Hypertension Brother     Other Father         accidental death when pt was a little girl    Diabetes Brother      Social History     Tobacco Use    Smoking status: Never Smoker    Smokeless tobacco: Never Used   Substance Use Topics    Alcohol use: No       Gely Dickinson, who was evaluated through a synchronous (real-time) audio-video encounter, and/or her healthcare decision maker, is aware that it is a billable service, with coverage as determined by her insurance carrier. She provided verbal consent to proceed: Yes, and patient identification was verified.  It was conducted pursuant to the emergency declaration under the [employer] and the [facility], [address] [place] [address] authority and the [employer] General Act. A caregiver was present when appropriate. Ability to conduct physical exam was limited. I was at home. The patient was at home. Nubia Sidhu MD   [phone] [address] VISIT       CHIEF COMPLAINT     Martina Aragon, 80 y.o. female, presents for No chief complaint on file. Nelia Annette HPI       OBJECTIVE   There were no vitals taken for this visit. BP Readings from Last 3 Encounters:   10/13/20 (!) 163/73   07/06/20 156/73   01/20/20 149/72      Wt Readings from Last 3 Encounters:   10/13/20 182 lb (82.6 kg)   07/06/20 182 lb 6.4 oz (82.7 kg)   01/20/20 189 lb 6.4 oz (85.9 kg)     Physical Exam      ASSESSMENT AND PLAN     Diagnoses and all orders for this visit:    1. Type 2 diabetes mellitus with nephropathy ([facility] [address]    2. Screening for depression  -     DEPRESSION SCREEN ANNUAL    3. Alzheimer's dementia without behavioral disturbance, unspecified timing of dementia onset ([facility] [address]    4. Stage 3 chronic kidney disease, unspecified whether stage 3a or 3b CKD    5. Vitamin D deficiency    6. Mixed hyperlipidemia    7. Open-angle glaucoma, unspecified glaucoma stage, unspecified laterality, unspecified open-angle glaucoma type    8. Medicare annual wellness visit, subsequent        [address]  3 most recent PHQ Screens 10/13/2020   Little interest or pleasure in doing things Not at all   Feeling down, depressed, irritable, or hopeless Not at all   Total Score PHQ 2 0          FALL RISK SCREENING  Fall Risk Assessment, last 12 mths 10/13/2020   Able to walk? Yes   Fall in past 12 months?  No       GENERAL       HEALTH HABITS AND NUTRITION       HEARING/VISION/SKIN       SLEEP/MEMORY/ANXIETY       SUBSTANCE AND OPIOID USE SAFETY       ADLS       PHYSICAL ACTIVITY        TIMED UP AND GO TEST (If indicated)       MINI-COG SCORE (If indicated)       STOP-BANG SCORE (If indicated)         PREVENTIVE CARE -SCREENINGS      Health Maintenance Topics with due status: Overdue       Topic Date Due    COVID-19 Vaccine 01/16/1948    DTaP/Tdap/Td series 01/16/1953    Shingrix Vaccine Age 50> 01/16/1982    Bone Densitometry (Dexa) Screening 01/16/1997    Foot Exam Q1 05/10/2019    MICROALBUMIN Q1 11/19/2020     Health Maintenance Topics with due status: Not Due       Topic Last Completion Date    Eye Exam Retinal or Dilated 02/19/2019    Lipid Screen 07/06/2020    Medicare Yearly Exam 02/01/2021     Health Maintenance Topics with due status: Completed       Topic Last Completion Date    Pneumococcal 65+ years 07/08/2013    Flu Vaccine 10/13/2020         Colon cancer:  Not Indicated    Lung cancer (LDCT): Not Indicated    Hepatitis C:  Not Indicated    Diabetes:   Recommendation: USPSTF recommends screening ages 38-69 y/o if overweight or obese.  Medicare covers screening in those patients who are overweight, obese, have HTN or dyslipiemia, a personal history of gestational diabetes or prior elevated blood sugar, a family hx of DM, or any patient over age 72    Lipids:   Recommendation: screening for hyperlipidemia every 5 years after age 39    PREVENTIVE CARE - FEMALE SCREENINGS     Cervical cancer: Not Indicated    Breast cancer: mammogram was declined    Osteoporosis: Declined    AAA:   Not Indicated    IMMUNIZATIONS     Immunization History   Administered Date(s) Administered    Influenza High Dose Vaccine PF 12/08/2016, 12/08/2017    Influenza Vaccine 11/18/2013, 11/19/2014    Influenza Vaccine (Quad) PF (>6 Mo Flulaval, Fluarix, and >3 Yrs Afluria, Fluzone [id number]) 11/11/2015    Influenza Vaccine (Tri) Adjuvanted (>65 Yrs FLUAD TRI [id number]) 11/29/2018, 11/19/2019    Influenza Vaccine Split 10/27/2010, 09/27/2011, 11/19/2012    Influenza Vaccine Whole 10/05/2009    Influenza, Quadrivalent, Adjuvanted (>65 Yrs FLUAD QUAD [id number]) 10/13/2020    Pneumococcal Polysaccharide (PPSV-23) 07/08/2013       Pneumovax:   Recommendation: PPSV23 once for all >65 and high risk <65     Prevnar:   Recommendation: PCV13 only if >65 and immunocompromised or residing in a nursing home, or in areas of low childhood Pneumococcal vaccination    Influenza:   Recommendation: Vaccination annually, high dose if 65 or older    Shingrix:  Recommendation: Vaccination 2 shots 2-6 months apart for all age >47    TDaP:    Recommendation: Vaccination Booster with TDaP every 10 yr.      INDIVIDUALIZED SCREENING, EDUCATION, AND PLAN     The patient and any caregiver(s) were counseled on: Healthcare maintenance and preventive items as above. The patient is not on high risk medication(s) including benzodiaepines. Based on my evaluation of the patient and the Health Risk Assessment performed today, there is evidence of cognitive impairment. Medications, allergies, problem list, previous encounters, recent results, medical, social and family history reviewed in the electronic health record. Medications and problems are viewable in the Encounter tab for this visit. Current Outpatient Medications   Medication Sig    donepeziL (ARICEPT) 5 mg tablet Take 1 Tab by mouth nightly.  amLODIPine (NORVASC) 5 mg tablet Take 1 Tab by mouth daily.  metoprolol succinate (TOPROL-XL) 25 mg XL tablet Take 1 Tab by mouth daily.  pravastatin (PRAVACHOL) 40 mg tablet TAKE 1 TABLET BY MOUTH AT BEDTIME    metFORMIN (GLUCOPHAGE) 500 mg tablet TAKE 1 TABLET BY MOUTH TWICE A DAY WITH MEALS    cholecalciferol, vitamin D3, (VITAMIN D3) 2,000 unit tab Take  by mouth.  latanoprost (XALATAN) 0.005 % ophthalmic solution     aspirin delayed-release 81 mg tablet Take 81 mg by mouth daily.  Taking every other day  Indications: myocardial infarction prevention    acetaminophen (TYLENOL) 325 mg tablet Take 325-650 mg by mouth every six (6) hours as needed for Pain. Indications: HEADACHE DISORDER     No current facility-administered medications for this visit. There are no discontinued medications.   Allergies   Allergen Reactions    Ace Inhibitors Cough    Codeine Nausea and Vomiting     Past Medical History:   Diagnosis Date    DM Renal Manif Type II 2/11/2010    GERD (gastroesophageal reflux disease) 2/11/2010    Glaucoma, open angle 10/29/2010    Headache     HTN (hypertension) 2/11/2010    Hypercholesterolemia     Mixed hyperlipidemia 2/11/2010    Type II or unspecified type diabetes mellitus with renal manifestations, not stated as uncontrolled(250.40) ([address] 2/11/2010     Past Surgical History:   Procedure Laterality Date    HX COLONOSCOPY  10/5/2005    tics and int. hem; Dr. Cherylyn Leventhal; 10 year repeat    HX LARISSA AND BSO      [place] EGD TRANSORAL BIOPSY SINGLE/MULTIPLE  7/1/2013          Family History   Problem Relation Age of Onset    Cancer Mother         lung    Hypertension Mother     Cancer Brother         stomach    Hypertension Brother     Other Father         accidental death when pt was a little girl    Diabetes Brother      Social History     Socioeconomic History    Marital status:      Spouse name: Not on file    Number of children: Not on file    Years of education: Not on file    Highest education level: Not on file   Tobacco Use    Smoking status: Never Smoker    Smokeless tobacco: Never Used   Substance and Sexual Activity    Alcohol use: No    Drug use: No    Sexual activity: Not Currently     Partners: Male     Birth control/protection: None          Patient Care Team:  Florencia Fothergill, MD as PCP - General (Internal Medicine)  Florencia Fothergill, MD as PCP - [facility] Empaneled Provider  Spring Elias MD (Ophthalmology)  Lillie Albright DPM (Podiatry)  Joseph Mcneill MD (Gastroenterology)  Casey Deshpande MD (Urology)      Future Appointments   Date Time Provider Julián Risa   2/1/2021  4:00 PM Fara Poon MD [facility] AMB         Storm Vila MD, 2/1/2021  This encounter has been electronically signed

## 2021-02-01 NOTE — PROGRESS NOTES
Martina Aragon is a 80 y.o. female who presents for follow up. Daughter present. Per daughter, she is eating breakfast ok. But not eating lunch or dinner well. Is getting 2-3 supplement drinks a day. Per family she is losing weight. Normal BM. Wears depends. No skin breakdown. Not up at night. No falls. Previously given aricept, stopped by family. Per family, her mental function is worse, especially at night. Per family she is anxious at night.        Treated for HTN. BP elevated today. On amlodipine 5mg once a day and metoprolol XL 25mg daily.       Treated for HLP. On statin. No myalgias.       Diabetic. On metformin 500mg BID, no GI side effects.  Follows diabetic diet.  Weight stable.  Hba1C 6.1% in July 2020.       Up to date on eye exam. Has glaucoma, every 6 months. On drops. This is an established visit conducted via telemedicine with video. The patient has been instructed that this meets HIPAA criteria and acknowledges and agrees to this method of visitation. Pursuant to the emergency declaration under the [facility] [address] [facility] waiver authority and the [employer] Act, this Virtual Visit was conducted, with patient's consent, to reduce the patient's risk of exposure to COVID-19 and provide continuity of care for an established patient. Services were provided through a video synchronous discussion virtually to substitute for in-person clinic visit.         Past Medical History:   Diagnosis Date    DM Renal Manif Type II 2/11/2010    GERD (gastroesophageal reflux disease) 2/11/2010    Glaucoma, open angle 10/29/2010    Headache     HTN (hypertension) 2/11/2010    Hypercholesterolemia     Mixed hyperlipidemia 2/11/2010    Type II or unspecified type diabetes mellitus with renal manifestations, not stated as uncontrolled(250.40) ([facility] [address] 2/11/2010       Family History   Problem Relation Age of Onset    Cancer Mother         lung    Hypertension Mother     Cancer Brother         stomach    Hypertension Brother     Other Father         accidental death when pt was a little girl    Diabetes Brother        Social History     Socioeconomic History    Marital status:      Spouse name: Not on file    Number of children: Not on file    Years of education: Not on file    Highest education level: Not on file   Occupational History    Not on file   Social Needs    Financial resource strain: Not on file    Food insecurity     Worry: Not on file     Inability: Not on file   [employer] needs     Medical: Not on file     Non-medical: Not on file   Tobacco Use    Smoking status: Never Smoker    Smokeless tobacco: Never Used   Substance and Sexual Activity    Alcohol use: No    Drug use: No    Sexual activity: Not Currently     Partners: Male     Birth control/protection: None   Lifestyle    Physical activity     Days per week: Not on file     Minutes per session: Not on file    Stress: Not on file   Relationships    Social connections     Talks on phone: Not on file     Gets together: Not on file     Attends Protestant service: Not on file     Active member of club or organization: Not on file     Attends meetings of clubs or organizations: Not on file     Relationship status: Not on file    Intimate partner violence     Fear of current or ex partner: Not on file     Emotionally abused: Not on file     Physically abused: Not on file     Forced sexual activity: Not on file   Other Topics Concern    Not on file   Social History Narrative    Not on file       Current Outpatient Medications on File Prior to Visit   Medication Sig Dispense Refill    amLODIPine (NORVASC) 5 mg tablet Take 1 Tab by mouth daily. 90 Tab 3    metoprolol succinate (TOPROL-XL) 25 mg XL tablet Take 1 Tab by mouth daily.  90 Tab 3    pravastatin (PRAVACHOL) 40 mg tablet TAKE 1 TABLET BY MOUTH AT BEDTIME 90 Tab 3  metFORMIN (GLUCOPHAGE) 500 mg tablet TAKE 1 TABLET BY MOUTH TWICE A DAY WITH MEALS 180 Tab 3    cholecalciferol, vitamin D3, (VITAMIN D3) 2,000 unit tab Take  by mouth.  latanoprost (XALATAN) 0.005 % ophthalmic solution       aspirin delayed-release 81 mg tablet Take 81 mg by mouth daily. Taking every other day  Indications: myocardial infarction prevention      acetaminophen (TYLENOL) 325 mg tablet Take 325-650 mg by mouth every six (6) hours as needed for Pain. Indications: HEADACHE DISORDER      donepeziL (ARICEPT) 5 mg tablet Take 1 Tab by mouth nightly. 90 Tab 3     No current facility-administered medications on file prior to visit. Review of Systems  Pertinent items are noted in HPI. Objective:     Gen: well appearing female  HEENT: normal conjunctiva, no audible congestion, patient does not see oral erythema, has MMM  Neck: patient does not feel enlarged or tender LAD or masses  Resp: normal respiratory effort, no audible wheezing. CV: patient does not feel palpitations or heart irregularity  Abd: patient does not feel abdominal tenderness or mass, patient does not notice distension  Extrem: patient does not see swelling in ankles or joints. Neuro: Alert and oriented, able to answer questions without difficulty, able to move all extremities and walk normally        Assessment/Plan:       ICD-10-CM ICD-9-CM    1. Type 2 diabetes mellitus with nephropathy (HCC)  E11.21 250.40      583.81    2. Screening for depression  Z13.31 V79.0 DEPRESSION SCREEN ANNUAL   3. Alzheimer's dementia without behavioral disturbance, unspecified timing of dementia onset ([facility]  G30.9 331.0     F02.80 294.10    4. Stage 3 chronic kidney disease, unspecified whether stage 3a or 3b CKD  N18.30 585.3    5. Vitamin D deficiency  E55.9 268.9    6.  Mixed hyperlipidemia  E78.2 272.2    7. Open-angle glaucoma, unspecified glaucoma stage, unspecified laterality, unspecified open-angle glaucoma type  H40.10X0 365.10 365.70    8. Medicare annual wellness visit, subsequent  Z00.00 V70.0    9. Anxiety  F41.9 300.00 sertraline (ZOLOFT) 50 mg tablet   start with 1/2 tablet once a day for one week. Increase to full tablet if needed. This was a telemedicine visit with video. Christiano Bartholomew MD    Follow-up and Dispositions    · Return in about 6 months (around 8/1/2021) for follow up on medications. Dennis Michael

## 2021-02-01 NOTE — PATIENT INSTRUCTIONS
Medicare Wellness Visit, Female  
 
The best way to live healthy is to have a lifestyle where you eat a well-balanced diet, exercise regularly, limit alcohol use, and quit all forms of tobacco/nicotine, if applicable. 
  
Regular preventive services are another way to keep healthy. Preventive services (vaccines, screening tests, monitoring & exams) can help personalize your care plan, which helps you manage your own care. Screening tests can find health problems at the earliest stages, when they are easiest to treat.  
[facility] follows the current, evidence-based guidelines published by the [place] Preventive Services Task Force (USPSTF) when recommending preventive services for our patients. Because we follow these guidelines, sometimes recommendations change over time as research supports it. (For example, mammograms used to be recommended annually. Even though Medicare will still pay for an annual mammogram, the newer guidelines recommend a mammogram every two years for women of average risk).  Of course, you and your doctor may decide to screen more often for some diseases, based on your risk and your co-morbidities (chronic disease you are already diagnosed with).  
 
Preventive services for you include: 
- Medicare offers their members a free annual wellness visit, which is time for you and your primary care provider to discuss and plan for your preventive service needs. Take advantage of this benefit every year! 
-All adults over the age of 65 should receive the recommended pneumonia vaccines. Current USPSTF guidelines recommend a series of two vaccines for the best pneumonia protection.  
-All adults should have a flu vaccine yearly and a tetanus vaccine every 10 years.  
-All adults age 50 and older should receive the shingles vaccines (series of two vaccines).     
-All adults age 40-70 who are overweight should have a diabetes screening test once every three years.  
 -All adults born between [birth year] and 1965 should be screened once for Hepatitis C. 
-Other screening tests and preventive services for persons with diabetes include: an eye exam to screen for diabetic retinopathy, a kidney function test, a foot exam, and stricter control over your cholesterol.  
-Cardiovascular screening for adults with routine risk involves an electrocardiogram (ECG) at intervals determined by your doctor.  
-Colorectal cancer screenings should be done for adults age 54-65 with no increased risk factors for colorectal cancer. There are a number of acceptable methods of screening for this type of cancer. Each test has its own benefits and drawbacks. Discuss with your doctor what is most appropriate for you during your annual wellness visit. The different tests include: colonoscopy (considered the best screening method), a fecal occult blood test, a fecal DNA test, and sigmoidoscopy. 
 
-A bone mass density test is recommended when a woman turns 65 to screen for osteoporosis. This test is only recommended one time, as a screening. Some providers will use this same test as a disease monitoring tool if you already have osteoporosis. -Breast cancer screenings are recommended every other year for women of normal risk, age 54-69. 
-Cervical cancer screenings for women over age 72 are only recommended with certain risk factors. Here is a list of your current Health Maintenance items (your personalized list of preventive services) with a due date: 
Health Maintenance Due Topic Date Due  
 COVID-19 Vaccine (1 of 2) 01/16/1948  DTaP/Tdap/Td  (1 - Tdap) 01/16/1953  Shingles Vaccine (1 of 2) 01/16/1982  Bone Mineral Density   01/16/1997 [facility]  05/10/2019  Albumin Urine Test  11/19/2020

## 2021-02-07 DIAGNOSIS — E11.9 CONTROLLED TYPE 2 DIABETES MELLITUS WITHOUT COMPLICATION, WITHOUT LONG-TERM CURRENT USE OF INSULIN (HCC): Chronic | ICD-10-CM

## 2021-02-07 RX ORDER — METFORMIN HYDROCHLORIDE 500 MG/1
TABLET ORAL
Qty: 180 TAB | Refills: 3 | Status: SHIPPED | OUTPATIENT
Start: 2021-02-07 | End: 2022-01-29

## 2021-03-02 DIAGNOSIS — F41.9 ANXIETY: ICD-10-CM

## 2021-03-02 RX ORDER — SERTRALINE HYDROCHLORIDE 50 MG/1
50 TABLET, FILM COATED ORAL DAILY
Qty: 90 TAB | Refills: 0 | Status: SHIPPED | OUTPATIENT
Start: 2021-03-02 | End: 2021-06-19

## 2021-03-02 NOTE — TELEPHONE ENCOUNTER
Requested Prescriptions     Pending Prescriptions Disp Refills    sertraline (ZOLOFT) 50 mg tablet 90 Tab 0     Sig: Take 1 Tab by mouth daily. Future Appointments:  No future appointments. Last Appointment With Me:  2/1/2021     Requested Prescriptions     Pending Prescriptions Disp Refills    sertraline (ZOLOFT) 50 mg tablet 90 Tab 0     Sig: Take 1 Tab by mouth daily.

## 2021-03-19 DIAGNOSIS — I10 ESSENTIAL HYPERTENSION: ICD-10-CM

## 2021-03-19 RX ORDER — AMLODIPINE BESYLATE 5 MG/1
TABLET ORAL
Qty: 90 TAB | Refills: 3 | Status: SHIPPED | OUTPATIENT
Start: 2021-03-19 | End: 2022-03-08

## 2021-03-25 DIAGNOSIS — I10 ESSENTIAL HYPERTENSION: ICD-10-CM

## 2021-03-25 RX ORDER — METOPROLOL SUCCINATE 25 MG/1
TABLET, EXTENDED RELEASE ORAL
Qty: 90 TAB | Refills: 3 | Status: SHIPPED | OUTPATIENT
Start: 2021-03-25 | End: 2021-11-16

## 2021-06-19 DIAGNOSIS — F41.9 ANXIETY: ICD-10-CM

## 2021-06-19 RX ORDER — SERTRALINE HYDROCHLORIDE 50 MG/1
TABLET, FILM COATED ORAL
Qty: 90 TABLET | Refills: 0 | Status: SHIPPED | OUTPATIENT
Start: 2021-06-19 | End: 2021-09-14

## 2021-06-20 DIAGNOSIS — R41.3 MEMORY LOSS: ICD-10-CM

## 2021-06-21 RX ORDER — DONEPEZIL HYDROCHLORIDE 5 MG/1
TABLET, FILM COATED ORAL
Qty: 90 TABLET | Refills: 3 | Status: SHIPPED | OUTPATIENT
Start: 2021-06-21 | End: 2022-06-10

## 2021-07-03 DIAGNOSIS — E78.00 PURE HYPERCHOLESTEROLEMIA: ICD-10-CM

## 2021-07-04 RX ORDER — PRAVASTATIN SODIUM 40 MG/1
TABLET ORAL
Qty: 90 TABLET | Refills: 3 | Status: SHIPPED | OUTPATIENT
Start: 2021-07-04 | End: 2022-07-08

## 2021-08-03 ENCOUNTER — TELEPHONE (OUTPATIENT)
Dept: INTERNAL MEDICINE CLINIC | Age: 86
End: 2021-08-03

## 2021-08-03 ENCOUNTER — CLINICAL SUPPORT (OUTPATIENT)
Dept: INTERNAL MEDICINE CLINIC | Age: 86
End: 2021-08-03

## 2021-08-03 VITALS
SYSTOLIC BLOOD PRESSURE: 181 MMHG | DIASTOLIC BLOOD PRESSURE: 75 MMHG | RESPIRATION RATE: 16 BRPM | TEMPERATURE: 97.7 F | HEIGHT: 66 IN | HEART RATE: 71 BPM | OXYGEN SATURATION: 98 % | BODY MASS INDEX: 29.38 KG/M2

## 2021-08-03 DIAGNOSIS — I10 ESSENTIAL HYPERTENSION, BENIGN: Primary | ICD-10-CM

## 2021-08-03 NOTE — PROGRESS NOTES
Patient in for bp reading today. dtr present and stated that she has been disoriented lately in the evening. When she does this they check her bp and its high, ranging from systolic 345-602, diastolic . Denies headaches or feeling badly. still living in her own home with dtr present karlos. Decrease in appetite and fluids, says she has eaten and she hasnt mainly for lunch and dinner. Always eats breakfast. If not eating lunch or dinner and refuses they give her boost.  Denies any exits seeking or wandering. needing physical cues for adls like bathing and dressing. Appears well kept today, clean and well dressed, pleasant affect. Although has lost significant weight. Educated dtr at length on dm friendly snacks and dementia, signs to look out for with dehydration and wt loss. Educated on safety concerns.      Only doing 1/2 of an aricept at night .has not had metoprolol yet today or amlodipine     md updated on visit today     dtr lashell would like call back from nurse or md to advise on bp

## 2021-09-14 DIAGNOSIS — F41.9 ANXIETY: ICD-10-CM

## 2021-09-14 RX ORDER — SERTRALINE HYDROCHLORIDE 50 MG/1
TABLET, FILM COATED ORAL
Qty: 90 TABLET | Refills: 0 | Status: SHIPPED | OUTPATIENT
Start: 2021-09-14 | End: 2022-06-10

## 2021-11-16 ENCOUNTER — OFFICE VISIT (OUTPATIENT)
Dept: INTERNAL MEDICINE CLINIC | Age: 86
End: 2021-11-16
Payer: MEDICARE

## 2021-11-16 VITALS
WEIGHT: 159 LBS | BODY MASS INDEX: 25.55 KG/M2 | RESPIRATION RATE: 16 BRPM | TEMPERATURE: 97.2 F | HEART RATE: 61 BPM | OXYGEN SATURATION: 100 % | DIASTOLIC BLOOD PRESSURE: 70 MMHG | HEIGHT: 66 IN | SYSTOLIC BLOOD PRESSURE: 160 MMHG

## 2021-11-16 DIAGNOSIS — F02.80 ALZHEIMER'S DEMENTIA WITHOUT BEHAVIORAL DISTURBANCE, UNSPECIFIED TIMING OF DEMENTIA ONSET: ICD-10-CM

## 2021-11-16 DIAGNOSIS — E11.22 TYPE 2 DIABETES MELLITUS WITH DIABETIC CHRONIC KIDNEY DISEASE, UNSPECIFIED CKD STAGE, UNSPECIFIED WHETHER LONG TERM INSULIN USE (HCC): ICD-10-CM

## 2021-11-16 DIAGNOSIS — I10 ESSENTIAL HYPERTENSION: ICD-10-CM

## 2021-11-16 DIAGNOSIS — G30.9 ALZHEIMER'S DEMENTIA WITHOUT BEHAVIORAL DISTURBANCE, UNSPECIFIED TIMING OF DEMENTIA ONSET: ICD-10-CM

## 2021-11-16 DIAGNOSIS — Z23 ENCOUNTER FOR IMMUNIZATION: Primary | ICD-10-CM

## 2021-11-16 DIAGNOSIS — E55.9 VITAMIN D DEFICIENCY: ICD-10-CM

## 2021-11-16 DIAGNOSIS — E11.9 CONTROLLED TYPE 2 DIABETES MELLITUS WITHOUT COMPLICATION, WITHOUT LONG-TERM CURRENT USE OF INSULIN (HCC): ICD-10-CM

## 2021-11-16 DIAGNOSIS — H91.90 DECREASED HEARING, UNSPECIFIED LATERALITY: ICD-10-CM

## 2021-11-16 DIAGNOSIS — G47.00 INSOMNIA, UNSPECIFIED TYPE: ICD-10-CM

## 2021-11-16 LAB
25(OH)D3 SERPL-MCNC: 44.2 NG/ML (ref 30–100)
ALBUMIN SERPL-MCNC: 3.8 G/DL (ref 3.5–5)
ALBUMIN/GLOB SERPL: 0.9 {RATIO} (ref 1.1–2.2)
ALP SERPL-CCNC: 61 U/L (ref 45–117)
ALT SERPL-CCNC: 15 U/L (ref 12–78)
ANION GAP SERPL CALC-SCNC: 4 MMOL/L (ref 5–15)
AST SERPL-CCNC: 12 U/L (ref 15–37)
BASOPHILS # BLD: 0 K/UL (ref 0–0.1)
BASOPHILS NFR BLD: 0 % (ref 0–1)
BILIRUB SERPL-MCNC: 0.3 MG/DL (ref 0.2–1)
BUN SERPL-MCNC: 24 MG/DL (ref 6–20)
BUN/CREAT SERPL: 22 (ref 12–20)
CALCIUM SERPL-MCNC: 9.7 MG/DL (ref 8.5–10.1)
CHLORIDE SERPL-SCNC: 108 MMOL/L (ref 97–108)
CHOLEST SERPL-MCNC: 198 MG/DL
CO2 SERPL-SCNC: 28 MMOL/L (ref 21–32)
CREAT SERPL-MCNC: 1.08 MG/DL (ref 0.55–1.02)
DIFFERENTIAL METHOD BLD: ABNORMAL
EOSINOPHIL # BLD: 0.1 K/UL (ref 0–0.4)
EOSINOPHIL NFR BLD: 2 % (ref 0–7)
ERYTHROCYTE [DISTWIDTH] IN BLOOD BY AUTOMATED COUNT: 15.3 % (ref 11.5–14.5)
EST. AVERAGE GLUCOSE BLD GHB EST-MCNC: 120 MG/DL
GLOBULIN SER CALC-MCNC: 4.3 G/DL (ref 2–4)
GLUCOSE SERPL-MCNC: 104 MG/DL (ref 65–100)
HBA1C MFR BLD: 5.8 % (ref 4–5.6)
HCT VFR BLD AUTO: 36.3 % (ref 35–47)
HDLC SERPL-MCNC: 86 MG/DL
HDLC SERPL: 2.3 {RATIO} (ref 0–5)
HGB BLD-MCNC: 11.4 G/DL (ref 11.5–16)
IMM GRANULOCYTES # BLD AUTO: 0 K/UL (ref 0–0.04)
IMM GRANULOCYTES NFR BLD AUTO: 0 % (ref 0–0.5)
LDLC SERPL CALC-MCNC: 96.6 MG/DL (ref 0–100)
LYMPHOCYTES # BLD: 1.6 K/UL (ref 0.8–3.5)
LYMPHOCYTES NFR BLD: 28 % (ref 12–49)
MCH RBC QN AUTO: 27.6 PG (ref 26–34)
MCHC RBC AUTO-ENTMCNC: 31.4 G/DL (ref 30–36.5)
MCV RBC AUTO: 87.9 FL (ref 80–99)
MONOCYTES # BLD: 0.5 K/UL (ref 0–1)
MONOCYTES NFR BLD: 9 % (ref 5–13)
NEUTS SEG # BLD: 3.4 K/UL (ref 1.8–8)
NEUTS SEG NFR BLD: 61 % (ref 32–75)
NRBC # BLD: 0 K/UL (ref 0–0.01)
NRBC BLD-RTO: 0 PER 100 WBC
PLATELET # BLD AUTO: 235 K/UL (ref 150–400)
PMV BLD AUTO: 11.9 FL (ref 8.9–12.9)
POTASSIUM SERPL-SCNC: 4.3 MMOL/L (ref 3.5–5.1)
PROT SERPL-MCNC: 8.1 G/DL (ref 6.4–8.2)
RBC # BLD AUTO: 4.13 M/UL (ref 3.8–5.2)
SODIUM SERPL-SCNC: 140 MMOL/L (ref 136–145)
TRIGL SERPL-MCNC: 77 MG/DL (ref ?–150)
VLDLC SERPL CALC-MCNC: 15.4 MG/DL
WBC # BLD AUTO: 5.6 K/UL (ref 3.6–11)

## 2021-11-16 PROCEDURE — G0463 HOSPITAL OUTPT CLINIC VISIT: HCPCS | Performed by: INTERNAL MEDICINE

## 2021-11-16 PROCEDURE — G8427 DOCREV CUR MEDS BY ELIG CLIN: HCPCS | Performed by: INTERNAL MEDICINE

## 2021-11-16 PROCEDURE — 1101F PT FALLS ASSESS-DOCD LE1/YR: CPT | Performed by: INTERNAL MEDICINE

## 2021-11-16 PROCEDURE — G8536 NO DOC ELDER MAL SCRN: HCPCS | Performed by: INTERNAL MEDICINE

## 2021-11-16 PROCEDURE — 1090F PRES/ABSN URINE INCON ASSESS: CPT | Performed by: INTERNAL MEDICINE

## 2021-11-16 PROCEDURE — G8419 CALC BMI OUT NRM PARAM NOF/U: HCPCS | Performed by: INTERNAL MEDICINE

## 2021-11-16 PROCEDURE — 99214 OFFICE O/P EST MOD 30 MIN: CPT | Performed by: INTERNAL MEDICINE

## 2021-11-16 PROCEDURE — G8432 DEP SCR NOT DOC, RNG: HCPCS | Performed by: INTERNAL MEDICINE

## 2021-11-16 PROCEDURE — 90694 VACC AIIV4 NO PRSRV 0.5ML IM: CPT | Performed by: INTERNAL MEDICINE

## 2021-11-16 RX ORDER — METOPROLOL SUCCINATE 50 MG/1
50 TABLET, EXTENDED RELEASE ORAL DAILY
Qty: 90 TABLET | Refills: 3 | Status: SHIPPED | OUTPATIENT
Start: 2021-11-16 | End: 2022-06-10

## 2021-11-16 RX ORDER — TRAZODONE HYDROCHLORIDE 50 MG/1
50 TABLET ORAL
Qty: 30 TABLET | Refills: 5 | Status: SHIPPED | OUTPATIENT
Start: 2021-11-16 | End: 2021-12-13 | Stop reason: SDUPTHER

## 2021-11-16 NOTE — PROGRESS NOTES
Florence Crawford is a 80 y.o. female  Chief Complaint   Patient presents with    Hypertension    Diabetes    Memory Loss    Medication Evaluation     Health Maintenance Due   Topic Date Due    DTaP/Tdap/Td series (1 - Tdap) Never done    Shingrix Vaccine Age 50> (1 of 2) Never done    Bone Densitometry (Dexa) Screening  Never done    Foot Exam Q1  05/10/2019    MICROALBUMIN Q1  11/19/2020    Eye Exam Retinal or Dilated  02/19/2021    Lipid Screen  07/06/2021    Flu Vaccine (1) 09/01/2021    COVID-19 Vaccine (3 - Booster for Moderna series) 09/26/2021     Visit Vitals  BP (!) 160/70   Pulse 61   Temp 97.2 °F (36.2 °C) (Temporal)   Resp 16   Ht 5' 6\" (1.676 m)   Wt 159 lb (72.1 kg)   SpO2 100%   BMI 25.66 kg/m²

## 2021-11-16 NOTE — PROGRESS NOTES
PROGRESS NOTE  Name: Irvin Red   : 1932       ASSESSMENT/ PLAN:     Diagnoses and all orders for this visit:    Controlled type 2 diabetes mellitus without complication, without long-term current use of insulin ([facility] [address]  -     LIPID PANEL; Future  -     METABOLIC PANEL, COMPREHENSIVE; Future  -     CBC WITH AUTOMATED DIFF; Future  -     HEMOGLOBIN A1C WITH EAG; Future  -     MICROALBUMIN, UR, RAND W/ MICROALB/CREAT RATIO; Future  -     HM DIABETES FOOT EXAM    Essential hypertension  -     metoprolol succinate (TOPROL-XL) 50 mg XL tablet; Take 1 Tablet by mouth daily. , Normal, Disp-90 Tablet, R-3    Alzheimer's dementia without behavioral disturbance, unspecified timing of dementia onset ([facility] [address]    Vitamin D deficiency  -     VITAMIN D, 25 HYDROXY; Future    Type 2 diabetes mellitus with diabetic chronic kidney disease, unspecified CKD stage, unspecified whether long term insulin use (HCC)    Decreased hearing, unspecified laterality  -     REFERRAL TO ENT-OTOLARYNGOLOGY    Insomnia, unspecified type  -     traZODone (DESYREL) 50 mg tablet; Take 1 Tablet by mouth nightly., Normal, Disp-30 Tablet, R-5      Follow-up and Dispositions  ·   Return in about 6 months (around 2022) for DM, etc; with Dr. Raymond English. I have reviewed the patient's medications and risks/side effects/benefits were discussed. Diagnosis(-es) explained to patient and questions answered. Literature provided where appropriate. SUBJECTIVE:   Ms. Irvin Red is a 80 y.o. female patient of Maura Cochran MD who is here for follow up of routine medical issues. Chief Complaint   Patient presents with    Hypertension    Diabetes    Memory Loss    Medication Evaluation       She takes the toprol XL bid, and want it re-written. \"That medicine Dr. Raymond English gave her for  isn't working. \" Sertraline. Patient is agitated and confused.   Some nights she is up restless and fidgeting, and not sleeping. At this time, she is otherwise doing well and has brought no other complaints to my attention today. For a list of the medical issues addressed today, see the assessment and plan below. PMH:   Past Medical History:   Diagnosis Date    DM Renal Manif Type II 2/11/2010    GERD (gastroesophageal reflux disease) 2/11/2010    Glaucoma, open angle 10/29/2010    Headache     HTN (hypertension) 2/11/2010    Hypercholesterolemia     Mixed hyperlipidemia 2/11/2010    Type II or unspecified type diabetes mellitus with renal manifestations, not stated as uncontrolled(250.40) ([facility] [address] 2/11/2010       Past Surgical History:   Procedure Laterality Date    HX COLONOSCOPY  10/5/2005    tics and int. hem; Dr. Corey Wright; 10 year repeat    HX LARISSA AND BSO      MN EGD TRANSORAL BIOPSY SINGLE/MULTIPLE  7/1/2013            All: She is allergic to ace inhibitors and codeine. Current Outpatient Medications   Medication Sig    sertraline (ZOLOFT) 50 mg tablet TAKE 1 TABLET BY MOUTH EVERY DAY    pravastatin (PRAVACHOL) 40 mg tablet TAKE 1 TABLET BY MOUTH EVERYDAY AT BEDTIME    donepeziL (ARICEPT) 5 mg tablet TAKE 1 TABLET BY MOUTH EVERY DAY AT NIGHT    metoprolol succinate (TOPROL-XL) 25 mg XL tablet TAKE 1 TABLET BY MOUTH EVERY DAY    amLODIPine (NORVASC) 5 mg tablet TAKE 1 TABLET BY MOUTH EVERY DAY    metFORMIN (GLUCOPHAGE) 500 mg tablet TAKE 1 TABLET BY MOUTH TWICE A DAY WITH MEALS    cholecalciferol, vitamin D3, (VITAMIN D3) 2,000 unit tab Take  by mouth.  latanoprost (XALATAN) 0.005 % ophthalmic solution     aspirin delayed-release 81 mg tablet Take 81 mg by mouth daily. Taking every other day  Indications: myocardial infarction prevention    acetaminophen (TYLENOL) 325 mg tablet Take 325-650 mg by mouth every six (6) hours as needed for Pain. Indications: HEADACHE DISORDER     No current facility-administered medications for this visit.        FH: Her family history includes Cancer in her brother and mother; Diabetes in her brother; Hypertension in her brother and mother; Other in her father. SH: She reports that she has never smoked. She has never used smokeless tobacco. She reports that she does not drink alcohol and does not use drugs. ROS: See above; Complete ROS otherwise negative. OBJECTIVE:   Vitals:   Visit Vitals  BP (!) 160/70   Pulse 61   Temp 97.2 °F (36.2 °C) (Temporal)   Resp 16   Ht 5' 6\" (1.676 m)   Wt 159 lb (72.1 kg)   SpO2 100%   BMI 25.66 kg/m²      Gen: Pleasant 80 y.o.  female in NAD. HEENT: PERRLA. EOMI. OP moist and pink. Neck: Supple. No LAD. HEART: RRR, No M/G/R.    LUNGS: CTAB No W/R. ABDOMEN: S, NT, ND, BS+. EXTREMITIES: Warm. Lab Results   Component Value Date/Time    Sodium 144 07/06/2020 12:00 AM    Potassium 4.0 07/06/2020 12:00 AM    Chloride 106 07/06/2020 12:00 AM    CO2 26 07/06/2020 12:00 AM    Anion gap 9 10/22/2010 12:09 PM    Glucose 118 (H) 07/06/2020 12:00 AM    BUN 25 07/06/2020 12:00 AM    Creatinine 1.04 (H) 07/06/2020 12:00 AM    BUN/Creatinine ratio 24 07/06/2020 12:00 AM    GFR est AA 55 (L) 07/06/2020 12:00 AM    GFR est non-AA 48 (L) 07/06/2020 12:00 AM    Calcium 9.4 07/06/2020 12:00 AM    Bilirubin, total 0.3 07/06/2020 12:00 AM    ALT (SGPT) 9 07/06/2020 12:00 AM    Alk.  phosphatase 71 07/06/2020 12:00 AM    Protein, total 7.7 07/06/2020 12:00 AM    Albumin 4.2 07/06/2020 12:00 AM    Globulin 4.1 (H) 10/22/2010 12:09 PM    A-G Ratio 1.2 07/06/2020 12:00 AM       Lab Results   Component Value Date/Time    Cholesterol, total 171 07/06/2020 12:00 AM    HDL Cholesterol 73 07/06/2020 12:00 AM    LDL, calculated 87 07/06/2020 12:00 AM    Triglyceride 53 07/06/2020 12:00 AM    CHOL/HDL Ratio 2.5 10/22/2010 12:09 PM        Lab Results   Component Value Date/Time    Hemoglobin A1c 6.1 (H) 07/06/2020 12:00 AM       Lab Results   Component Value Date/Time    WBC 6.1 11/19/2019 09:27 AM    HGB 10.4 (L) 11/19/2019 09:27 AM    HCT 32.4 (L) 11/19/2019 09:27 AM    PLATELET 114 66/47/6286 09:27 AM    MCV 82 11/19/2019 09:27 AM

## 2021-12-09 DIAGNOSIS — G47.00 INSOMNIA, UNSPECIFIED TYPE: ICD-10-CM

## 2021-12-13 RX ORDER — TRAZODONE HYDROCHLORIDE 50 MG/1
50 TABLET ORAL
Qty: 90 TABLET | Refills: 0 | Status: SHIPPED | OUTPATIENT
Start: 2021-12-13 | End: 2022-06-10

## 2021-12-13 RX ORDER — TRAZODONE HYDROCHLORIDE 50 MG/1
50 TABLET ORAL
Qty: 90 TABLET | Refills: 3 | OUTPATIENT
Start: 2021-12-13

## 2022-01-29 DIAGNOSIS — E11.9 CONTROLLED TYPE 2 DIABETES MELLITUS WITHOUT COMPLICATION, WITHOUT LONG-TERM CURRENT USE OF INSULIN (HCC): Chronic | ICD-10-CM

## 2022-01-29 RX ORDER — METFORMIN HYDROCHLORIDE 500 MG/1
TABLET ORAL
Qty: 180 TABLET | Refills: 3 | Status: SHIPPED | OUTPATIENT
Start: 2022-01-29

## 2022-03-08 DIAGNOSIS — I10 ESSENTIAL HYPERTENSION: ICD-10-CM

## 2022-03-08 RX ORDER — METOPROLOL SUCCINATE 25 MG/1
TABLET, EXTENDED RELEASE ORAL
Qty: 90 TABLET | Refills: 3 | Status: SHIPPED | OUTPATIENT
Start: 2022-03-08

## 2022-03-08 RX ORDER — AMLODIPINE BESYLATE 5 MG/1
TABLET ORAL
Qty: 90 TABLET | Refills: 3 | Status: SHIPPED | OUTPATIENT
Start: 2022-03-08

## 2022-03-18 PROBLEM — F03.90 DEMENTIA WITHOUT BEHAVIORAL DISTURBANCE (HCC): Status: ACTIVE | Noted: 2017-09-08

## 2022-03-18 PROBLEM — N18.30 CKD (CHRONIC KIDNEY DISEASE) STAGE 3, GFR 30-59 ML/MIN (HCC): Status: ACTIVE | Noted: 2019-11-23

## 2022-03-19 PROBLEM — E11.22 TYPE 2 DIABETES MELLITUS WITH CHRONIC KIDNEY DISEASE (HCC): Status: ACTIVE | Noted: 2021-11-16

## 2022-03-19 PROBLEM — E11.21 TYPE 2 DIABETES MELLITUS WITH NEPHROPATHY (HCC): Status: ACTIVE | Noted: 2017-12-23

## 2022-03-19 PROBLEM — E55.9 VITAMIN D DEFICIENCY: Status: ACTIVE | Noted: 2017-09-08

## 2022-03-20 PROBLEM — N28.9 RENAL INSUFFICIENCY: Status: ACTIVE | Noted: 2018-02-09

## 2022-06-08 ENCOUNTER — TELEPHONE (OUTPATIENT)
Dept: INTERNAL MEDICINE CLINIC | Age: 87
End: 2022-06-08

## 2022-06-08 NOTE — TELEPHONE ENCOUNTER
----- Message from Anita Quintanilla sent at 6/8/2022  8:08 AM EDT -----  Subject: Message to Provider    QUESTIONS  Information for Provider? Daughter called to advise that patient has been   experiencing a lot of anxiety and is not able to sleep at night. Daughter   is asking if Dr. Andrey Bass would be able to prescribe medication to help. She has prescribed anxiety medicine in the past and seem to help. Please   review and advise and contact daughter, Zena Membreno, [phone]  ---------------------------------------------------------------------------  --------------  [address]  What is the best way for the office to contact you? OK to leave message on   voicemail  Preferred Call Back Phone Number? [phone]  ---------------------------------------------------------------------------  --------------  SCRIPT ANSWERS  Relationship to Patient? Other  Representative Name? Zena Membreno, daughter  Is the Representative on the appropriate HIPAA document in Epic?  Yes

## 2022-06-09 PROBLEM — N18.30 CHRONIC RENAL DISEASE, STAGE III (HCC): Status: ACTIVE | Noted: 2022-06-09

## 2022-06-10 ENCOUNTER — OFFICE VISIT (OUTPATIENT)
Dept: INTERNAL MEDICINE CLINIC | Age: 87
End: 2022-06-10
Payer: MEDICARE

## 2022-06-10 VITALS
HEART RATE: 65 BPM | OXYGEN SATURATION: 99 % | WEIGHT: 156.2 LBS | BODY MASS INDEX: 25.1 KG/M2 | TEMPERATURE: 98.2 F | HEIGHT: 66 IN | RESPIRATION RATE: 16 BRPM | SYSTOLIC BLOOD PRESSURE: 177 MMHG | DIASTOLIC BLOOD PRESSURE: 70 MMHG

## 2022-06-10 DIAGNOSIS — F41.9 ANXIETY: Primary | ICD-10-CM

## 2022-06-10 DIAGNOSIS — G30.9 ALZHEIMER'S DEMENTIA WITHOUT BEHAVIORAL DISTURBANCE, UNSPECIFIED TIMING OF DEMENTIA ONSET: ICD-10-CM

## 2022-06-10 DIAGNOSIS — Z00.00 MEDICARE ANNUAL WELLNESS VISIT, SUBSEQUENT: ICD-10-CM

## 2022-06-10 DIAGNOSIS — Z13.31 SCREENING FOR DEPRESSION: ICD-10-CM

## 2022-06-10 DIAGNOSIS — E11.22 TYPE 2 DIABETES MELLITUS WITH DIABETIC CHRONIC KIDNEY DISEASE, UNSPECIFIED CKD STAGE, UNSPECIFIED WHETHER LONG TERM INSULIN USE (HCC): ICD-10-CM

## 2022-06-10 DIAGNOSIS — I10 ESSENTIAL HYPERTENSION, BENIGN: ICD-10-CM

## 2022-06-10 DIAGNOSIS — F02.80 ALZHEIMER'S DEMENTIA WITHOUT BEHAVIORAL DISTURBANCE, UNSPECIFIED TIMING OF DEMENTIA ONSET: ICD-10-CM

## 2022-06-10 PROBLEM — N18.30 CHRONIC RENAL DISEASE, STAGE III (HCC): Status: RESOLVED | Noted: 2022-06-09 | Resolved: 2022-06-10

## 2022-06-10 PROBLEM — N18.30 CKD (CHRONIC KIDNEY DISEASE) STAGE 3, GFR 30-59 ML/MIN (HCC): Status: RESOLVED | Noted: 2019-11-23 | Resolved: 2022-06-10

## 2022-06-10 LAB — HBA1C MFR BLD HPLC: 5.8 %

## 2022-06-10 PROCEDURE — G0463 HOSPITAL OUTPT CLINIC VISIT: HCPCS | Performed by: FAMILY MEDICINE

## 2022-06-10 PROCEDURE — G0444 DEPRESSION SCREEN ANNUAL: HCPCS | Performed by: FAMILY MEDICINE

## 2022-06-10 PROCEDURE — G8510 SCR DEP NEG, NO PLAN REQD: HCPCS | Performed by: FAMILY MEDICINE

## 2022-06-10 PROCEDURE — 83036 HEMOGLOBIN GLYCOSYLATED A1C: CPT | Performed by: FAMILY MEDICINE

## 2022-06-10 PROCEDURE — G0439 PPPS, SUBSEQ VISIT: HCPCS | Performed by: FAMILY MEDICINE

## 2022-06-10 PROCEDURE — G8427 DOCREV CUR MEDS BY ELIG CLIN: HCPCS | Performed by: FAMILY MEDICINE

## 2022-06-10 PROCEDURE — G8417 CALC BMI ABV UP PARAM F/U: HCPCS | Performed by: FAMILY MEDICINE

## 2022-06-10 PROCEDURE — 99214 OFFICE O/P EST MOD 30 MIN: CPT | Performed by: FAMILY MEDICINE

## 2022-06-10 PROCEDURE — 1090F PRES/ABSN URINE INCON ASSESS: CPT | Performed by: FAMILY MEDICINE

## 2022-06-10 PROCEDURE — G8536 NO DOC ELDER MAL SCRN: HCPCS | Performed by: FAMILY MEDICINE

## 2022-06-10 PROCEDURE — 1101F PT FALLS ASSESS-DOCD LE1/YR: CPT | Performed by: FAMILY MEDICINE

## 2022-06-10 RX ORDER — PAROXETINE 10 MG/1
10 TABLET, FILM COATED ORAL DAILY
Qty: 90 TABLET | Refills: 1 | Status: SHIPPED | OUTPATIENT
Start: 2022-06-10

## 2022-06-10 NOTE — PROGRESS NOTES
1. \"Have you been to the ER, urgent care clinic since your last visit? Hospitalized since your last visit? \" No    2. \"Have you seen or consulted any other health care providers outside of the [address] [place] [address] since your last visit? \" No     3. For patients aged 39-70: Has the patient had a colonoscopy / FIT/ Cologuard? NA - based on age      If the patient is female:    4. For patients aged 41-77: Has the patient had a mammogram within the past 2 years? NA - based on age or sex      11. For patients aged 21-65: Has the patient had a pap smear?  NA - based on age or sex

## 2022-06-10 NOTE — PROGRESS NOTES
Zunilda Santos is a 80 y.o. female who presents for follow up. Daughter present. Lives with 2 daughters. Patient has dementia. Prior treatment with Aricept. Prior insomnia. Took trazodone 50 mg at bedtime, not helpful. No medication for sleep. Prior melatonin. Daughter reports high anxiety. Sits in chair. Eats 3 meals a day, has to feed her at times. Dinner at [employer], to bed, sleeps until 7am or sometimes until 3pm.  Prior sertraline for anxiety. Not tolerated.     Normal BM. Wears depends. No skin breakdown. Not up at night. No falls.        Treated for HTN. BP elevated today. On amlodipine 5mg once a day and metoprolol XL 25mg daily.       Treated for HLP. On statin. No myalgias.  LDL at goal     Diabetic. On metformin 500mg BID, no GI side effects.  Follows diabetic diet.  Weight stable.  Hba1C 5.8 % in November 2021     Up to date on eye exam. Has glaucoma, every 6 months. On drops.            Past Medical History:   Diagnosis Date    DM Renal Manif Type II 2/11/2010    GERD (gastroesophageal reflux disease) 2/11/2010    Glaucoma, open angle 10/29/2010    Headache     HTN (hypertension) 2/11/2010    Hypercholesterolemia     Mixed hyperlipidemia 2/11/2010    Type II or unspecified type diabetes mellitus with renal manifestations, not stated as uncontrolled(250.40) ([facility] [address] 2/11/2010       Family History   Problem Relation Age of Onset    Cancer Mother         lung    Hypertension Mother     Cancer Brother         stomach    Hypertension Brother     Other Father         accidental death when pt was a little girl    Diabetes Brother        Social History     Socioeconomic History    Marital status:      Spouse name: Not on file    Number of children: Not on file    Years of education: Not on file    Highest education level: Not on file   Occupational History    Not on file   Tobacco Use    Smoking status: Never Smoker    Smokeless tobacco: Never Used   Substance and Sexual Activity    Alcohol use: No    Drug use: No    Sexual activity: Not Currently     Partners: Male     Birth control/protection: None   Other Topics Concern    Not on file   Social History Narrative    Not on file     Social Determinants of Health     Financial Resource Strain:     Difficulty of Paying Living Expenses: Not on file   Food Insecurity:     Worried About Running Out of Food in the Last Year: Not on file    Angelica of Food in the Last Year: Not on file   Transportation Needs:     Lack of Transportation (Medical): Not on file    Lack of Transportation (Non-Medical):  Not on file   Physical Activity:     Days of Exercise per Week: Not on file    Minutes of Exercise per Session: Not on file   Stress:     Feeling of Stress : Not on file   Social Connections:     Frequency of Communication with Friends and Family: Not on file    Frequency of Social Gatherings with Friends and Family: Not on file    Attends Rastafari Services: Not on file    Active Member of Clubs or Organizations: Not on file    Attends Club or Organization Meetings: Not on file    Marital Status: Not on file   Intimate Partner Violence:     Fear of Current or Ex-Partner: Not on file    Emotionally Abused: Not on file    Physically Abused: Not on file    Sexually Abused: Not on file   Housing Stability:     Unable to Pay for Housing in the Last Year: Not on file    Number of Places Lived in the Last Year: Not on file    Unstable Housing in the Last Year: Not on file       Current Outpatient Medications on File Prior to Visit   Medication Sig Dispense Refill    amLODIPine (NORVASC) 5 mg tablet TAKE 1 TABLET BY MOUTH EVERY DAY 90 Tablet 3    metoprolol succinate (TOPROL-XL) 25 mg XL tablet TAKE 1 TABLET BY MOUTH EVERY DAY 90 Tablet 3    metFORMIN (GLUCOPHAGE) 500 mg tablet TAKE 1 TABLET BY MOUTH TWICE A DAY WITH MEALS 180 Tablet 3    pravastatin (PRAVACHOL) 40 mg tablet TAKE 1 TABLET BY MOUTH EVERYDAY AT BEDTIME 90 Tablet 3    cholecalciferol, vitamin D3, (VITAMIN D3) 2,000 unit tab Take  by mouth.  latanoprost (XALATAN) 0.005 % ophthalmic solution       aspirin delayed-release 81 mg tablet Take 81 mg by mouth daily. Taking every other day  Indications: myocardial infarction prevention      [DISCONTINUED] traZODone (DESYREL) 50 mg tablet Take 1 Tablet by mouth nightly. (Patient not taking: Reported on 6/10/2022) 90 Tablet 0    [DISCONTINUED] metoprolol succinate (TOPROL-XL) 50 mg XL tablet Take 1 Tablet by mouth daily. 90 Tablet 3    [DISCONTINUED] sertraline (ZOLOFT) 50 mg tablet TAKE 1 TABLET BY MOUTH EVERY DAY (Patient not taking: Reported on 6/10/2022) 90 Tablet 0    [DISCONTINUED] donepeziL (ARICEPT) 5 mg tablet TAKE 1 TABLET BY MOUTH EVERY DAY AT NIGHT (Patient not taking: Reported on 6/10/2022) 90 Tablet 3    acetaminophen (TYLENOL) 325 mg tablet Take 325-650 mg by mouth every six (6) hours as needed for Pain. Indications: HEADACHE DISORDER       No current facility-administered medications on file prior to visit. Review of Systems  Pertinent items are noted in HPI. Objective:     Visit Vitals  BP (!) 177/70 (BP 1 Location: Left arm, BP Patient Position: Sitting, BP Cuff Size: Small adult)   Pulse 65   Temp 98.2 °F (36.8 °C) (Temporal)   Resp 16   Ht 5' 6\" (1.676 m)   Wt 156 lb 3.2 oz (70.9 kg)   SpO2 99%   BMI 25.21 kg/m²     Gen: well appearing female [place]. Resp:  No wheezing, no rhonchi, no rales. CV:  RRR, normal S1S2, no murmur. Extrem:  +2 pulses, no edema, warm distally  Neuro: alert, cognitive deficity      Assessment/Plan:       ICD-10-CM ICD-9-CM    1. Anxiety  F41.9 300.00 PARoxetine (PAXIL) 10 mg tablet   2. Medicare annual wellness visit, subsequent  Z00.00 V70.0    3. Screening for depression  Z13.31 V79.0 DEPRESSION SCREEN ANNUAL   4. Essential hypertension, benign  I10 401.1    5.  Alzheimer's dementia without behavioral disturbance, unspecified timing of dementia onset ([facility] [address] G30.9 331.0     F02.80 294.10    6. Type 2 diabetes mellitus with diabetic chronic kidney disease, unspecified CKD stage, unspecified whether long term insulin use (HCC)  E11.22 250.40 AMB POC HEMOGLOBIN A1C     585.9      Start paxil 10mg daily. Follow-up and Dispositions    · Return in about 6 months (around 12/10/2022) for follow up on medications. Deondre Garcia MD    This is the Subsequent Medicare Annual Wellness Exam, performed 12 months or more after the Initial AWV or the last Subsequent AWV    I have reviewed the patient's medical history in detail and updated the computerized patient record. Assessment/Plan   Education and counseling provided:  Are appropriate based on today's review and evaluation    1. Anxiety  -     PARoxetine (PAXIL) 10 mg tablet; Take 1 Tablet by mouth daily. , Normal, Disp-90 Tablet, R-1Cancel any refills on sertraline  2. Medicare annual wellness visit, subsequent  3. Screening for depression  -     DEPRESSION SCREEN ANNUAL  4. Essential hypertension, benign  5. Alzheimer's dementia without behavioral disturbance, unspecified timing of dementia onset ([facility] [address]  6. Type 2 diabetes mellitus with diabetic chronic kidney disease, unspecified CKD stage, unspecified whether long term insulin use (HCC)  -     AMB POC HEMOGLOBIN A1C       Depression Risk Factor Screening     3 most recent PHQ Screens 6/10/2022   Little interest or pleasure in doing things Not at all   Feeling down, depressed, irritable, or hopeless More than half the days   Total Score PHQ 2 2       Alcohol & Drug Abuse Risk Screen    Do you average more than 1 drink per night or more than 7 drinks a week:  No    On any one occasion in the past three months have you have had more than 3 drinks containing alcohol:  No          Functional Ability and Level of Safety    Hearing: hearing impaired. Activities of Daily Living: The home contains: no safety equipment.   Patient needs help with:  phone, transportation, shopping, preparing meals, laundry, housework, managing medications, managing money, eating, bathing, hygiene and bathroom needs      Ambulation: with no difficulty     Fall Risk:  Fall Risk Assessment, last 12 mths 8/3/2021   Able to walk? Yes   Fall in past 12 months? 0   Do you feel unsteady?  0   Are you worried about falling 0      Abuse Screen:  Patient is not abused       Cognitive Screening    Has your family/caregiver stated any concerns about your memory: yes - dementia     Cognitive Screening: Abnormal - dementia    Health Maintenance Due     Health Maintenance Due   Topic Date Due    DTaP/Tdap/Td series (1 - Tdap) Never done    Shingrix Vaccine Age 50> (1 of 2) Never done    Bone Densitometry (Dexa) Screening  Never done    Pneumococcal 65+ years (2 - PCV) 07/08/2014    MICROALBUMIN Q1  11/19/2020    Eye Exam Retinal or Dilated  02/19/2021    COVID-19 Vaccine (3 - Booster for Benjie Jan series) 08/26/2021       Patient Care Team   Patient Care Team:  Pat Asif MD as PCP - General (Internal Medicine Physician)  Pat Asif MD as PCP - [facility] Provider  iLlian Butler MD (Ophthalmology)  Bunny Renae DPM (Carmen Diaz)  Shahzad Allen MD (Gastroenterology)  Reinier Michael MD (Urology)    History     Patient Active Problem List   Diagnosis Code    Diabetes mellitus type 2, controlled, without complications ([address] U46.9    Mixed hyperlipidemia E78.2    GERD (gastroesophageal reflux disease) K21.9    Allergic rhinitis J30.9    Glaucoma, open angle H40.10X0    Alzheimer's dementia ([address] G30.9, F02.80    Edema of both legs R60.0    Unintentional weight loss M94.0    Systolic murmur V14.1    Essential hypertension, benign I10    ACE inhibitor intolerance Z78.9    Dementia without behavioral disturbance ([address] F03.90    Vitamin D deficiency E55.9    Type 2 diabetes mellitus with nephropathy ([address] E11.21    Renal insufficiency N28.9    Type 2 diabetes mellitus with chronic kidney disease [facility] E11.22     Past Medical History:   Diagnosis Date    DM Renal Manif Type II 2/11/2010    GERD (gastroesophageal reflux disease) 2/11/2010    Glaucoma, open angle 10/29/2010    Headache     HTN (hypertension) 2/11/2010    Hypercholesterolemia     Mixed hyperlipidemia 2/11/2010    Type II or unspecified type diabetes mellitus with renal manifestations, not stated as uncontrolled(250.40) ([facility] 2/11/2010      Past Surgical History:   Procedure Laterality Date    HX COLONOSCOPY  10/5/2005    tics and int. hem; Dr. Orona Males; 10 year repeat    HX LARISSA AND BSO      OK EGD TRANSORAL BIOPSY SINGLE/MULTIPLE  7/1/2013          Current Outpatient Medications   Medication Sig Dispense Refill    PARoxetine (PAXIL) 10 mg tablet Take 1 Tablet by mouth daily. 90 Tablet 1    amLODIPine (NORVASC) 5 mg tablet TAKE 1 TABLET BY MOUTH EVERY DAY 90 Tablet 3    metoprolol succinate (TOPROL-XL) 25 mg XL tablet TAKE 1 TABLET BY MOUTH EVERY DAY 90 Tablet 3    metFORMIN (GLUCOPHAGE) 500 mg tablet TAKE 1 TABLET BY MOUTH TWICE A DAY WITH MEALS 180 Tablet 3    pravastatin (PRAVACHOL) 40 mg tablet TAKE 1 TABLET BY MOUTH EVERYDAY AT BEDTIME 90 Tablet 3    cholecalciferol, vitamin D3, (VITAMIN D3) 2,000 unit tab Take  by mouth.  latanoprost (XALATAN) 0.005 % ophthalmic solution       aspirin delayed-release 81 mg tablet Take 81 mg by mouth daily. Taking every other day  Indications: myocardial infarction prevention      acetaminophen (TYLENOL) 325 mg tablet Take 325-650 mg by mouth every six (6) hours as needed for Pain.  Indications: HEADACHE DISORDER       Allergies   Allergen Reactions    Ace Inhibitors Cough    Codeine Nausea and Vomiting       Family History   Problem Relation Age of Onset    Cancer Mother         lung    Hypertension Mother     Cancer Brother         stomach    Hypertension Brother     Other Father         accidental death when pt was a little girl  Diabetes Brother      Social History     Tobacco Use    Smoking status: Never Smoker    Smokeless tobacco: Never Used   Substance Use Topics    Alcohol use: No         Jamal Ford MD

## 2022-06-10 NOTE — PATIENT INSTRUCTIONS
Medicare Wellness Visit, Female     The best way to live healthy is to have a lifestyle where you eat a well-balanced diet, exercise regularly, limit alcohol use, and quit all forms of tobacco/nicotine, if applicable. Regular preventive services are another way to keep healthy. Preventive services (vaccines, screening tests, monitoring & exams) can help personalize your care plan, which helps you manage your own care. Screening tests can find health problems at the earliest stages, when they are easiest to treat. [facility] follows the current, evidence-based guidelines published by the [facility] ([facility]) when recommending preventive services for our patients. Because we follow these guidelines, sometimes recommendations change over time as research supports it. (For example, mammograms used to be recommended annually. Even though Medicare will still pay for an annual mammogram, the newer guidelines recommend a mammogram every two years for women of average risk). Of course, you and your doctor may decide to screen more often for some diseases, based on your risk and your co-morbidities (chronic disease you are already diagnosed with). Preventive services for you include:  - Medicare offers their members a free annual wellness visit, which is time for you and your primary care provider to discuss and plan for your preventive service needs. Take advantage of this benefit every year!  -All adults over the age of 72 should receive the recommended pneumonia vaccines. Current USPSTF guidelines recommend a series of two vaccines for the best pneumonia protection.   -All adults should have a flu vaccine yearly and a tetanus vaccine every 10 years.   -All adults age 48 and older should receive the shingles vaccines (series of two vaccines).       -All adults age 38-68 who are overweight should have a diabetes screening test once every three years.   -All adults born between [birth year] and 1965 should be screened once for Hepatitis C.  -Other screening tests and preventive services for persons with diabetes include: an eye exam to screen for diabetic retinopathy, a kidney function test, a foot exam, and stricter control over your cholesterol.   -Cardiovascular screening for adults with routine risk involves an electrocardiogram (ECG) at intervals determined by your doctor.   -Colorectal cancer screenings should be done for adults age 54-65 with no increased risk factors for colorectal cancer. There are a number of acceptable methods of screening for this type of cancer. Each test has its own benefits and drawbacks. Discuss with your doctor what is most appropriate for you during your annual wellness visit. The different tests include: colonoscopy (considered the best screening method), a fecal occult blood test, a fecal DNA test, and sigmoidoscopy.    -A bone mass density test is recommended when a woman turns 65 to screen for osteoporosis. This test is only recommended one time, as a screening. Some providers will use this same test as a disease monitoring tool if you already have osteoporosis. -Breast cancer screenings are recommended every other year for women of normal risk, age 54-69.  -Cervical cancer screenings for women over age 72 are only recommended with certain risk factors.

## 2022-07-08 DIAGNOSIS — E78.00 PURE HYPERCHOLESTEROLEMIA: ICD-10-CM

## 2022-07-08 RX ORDER — PRAVASTATIN SODIUM 40 MG/1
TABLET ORAL
Qty: 90 TABLET | Refills: 3 | Status: SHIPPED | OUTPATIENT
Start: 2022-07-08

## 2022-11-10 DIAGNOSIS — I10 ESSENTIAL HYPERTENSION: ICD-10-CM

## 2022-11-11 RX ORDER — METOPROLOL SUCCINATE 50 MG/1
TABLET, EXTENDED RELEASE ORAL
Qty: 90 TABLET | Refills: 3 | OUTPATIENT
Start: 2022-11-11

## 2022-11-13 RX ORDER — METOPROLOL SUCCINATE 25 MG/1
25 TABLET, EXTENDED RELEASE ORAL DAILY
Qty: 90 TABLET | Refills: 3 | Status: SHIPPED | OUTPATIENT
Start: 2022-11-13

## 2022-11-16 ENCOUNTER — OFFICE VISIT (OUTPATIENT)
Dept: INTERNAL MEDICINE CLINIC | Age: 87
End: 2022-11-16
Payer: MEDICARE

## 2022-11-16 VITALS
BODY MASS INDEX: 24.27 KG/M2 | SYSTOLIC BLOOD PRESSURE: 172 MMHG | DIASTOLIC BLOOD PRESSURE: 69 MMHG | HEART RATE: 61 BPM | HEIGHT: 66 IN | RESPIRATION RATE: 16 BRPM | WEIGHT: 151 LBS | TEMPERATURE: 97.9 F | OXYGEN SATURATION: 97 %

## 2022-11-16 DIAGNOSIS — N18.30 STAGE 3 CHRONIC KIDNEY DISEASE, UNSPECIFIED WHETHER STAGE 3A OR 3B CKD (HCC): ICD-10-CM

## 2022-11-16 DIAGNOSIS — E11.9 CONTROLLED TYPE 2 DIABETES MELLITUS WITHOUT COMPLICATION, WITHOUT LONG-TERM CURRENT USE OF INSULIN (HCC): Primary | ICD-10-CM

## 2022-11-16 DIAGNOSIS — G30.9 ALZHEIMER'S DEMENTIA WITHOUT BEHAVIORAL DISTURBANCE, PSYCHOTIC DISTURBANCE, MOOD DISTURBANCE, OR ANXIETY, UNSPECIFIED DEMENTIA SEVERITY, UNSPECIFIED TIMING OF DEMENTIA ONSET (HCC): ICD-10-CM

## 2022-11-16 DIAGNOSIS — F41.9 ANXIETY: ICD-10-CM

## 2022-11-16 DIAGNOSIS — H40.10X0 OPEN-ANGLE GLAUCOMA, UNSPECIFIED GLAUCOMA STAGE, UNSPECIFIED LATERALITY, UNSPECIFIED OPEN-ANGLE GLAUCOMA TYPE: ICD-10-CM

## 2022-11-16 DIAGNOSIS — E78.2 MIXED HYPERLIPIDEMIA: ICD-10-CM

## 2022-11-16 DIAGNOSIS — F02.80 ALZHEIMER'S DEMENTIA WITHOUT BEHAVIORAL DISTURBANCE, PSYCHOTIC DISTURBANCE, MOOD DISTURBANCE, OR ANXIETY, UNSPECIFIED DEMENTIA SEVERITY, UNSPECIFIED TIMING OF DEMENTIA ONSET (HCC): ICD-10-CM

## 2022-11-16 DIAGNOSIS — Z23 NEEDS FLU SHOT: ICD-10-CM

## 2022-11-16 PROCEDURE — 1090F PRES/ABSN URINE INCON ASSESS: CPT | Performed by: FAMILY MEDICINE

## 2022-11-16 PROCEDURE — 1101F PT FALLS ASSESS-DOCD LE1/YR: CPT | Performed by: FAMILY MEDICINE

## 2022-11-16 PROCEDURE — G8420 CALC BMI NORM PARAMETERS: HCPCS | Performed by: FAMILY MEDICINE

## 2022-11-16 PROCEDURE — G8510 SCR DEP NEG, NO PLAN REQD: HCPCS | Performed by: FAMILY MEDICINE

## 2022-11-16 PROCEDURE — G0463 HOSPITAL OUTPT CLINIC VISIT: HCPCS | Performed by: FAMILY MEDICINE

## 2022-11-16 PROCEDURE — G8427 DOCREV CUR MEDS BY ELIG CLIN: HCPCS | Performed by: FAMILY MEDICINE

## 2022-11-16 PROCEDURE — 99214 OFFICE O/P EST MOD 30 MIN: CPT | Performed by: FAMILY MEDICINE

## 2022-11-16 PROCEDURE — G8536 NO DOC ELDER MAL SCRN: HCPCS | Performed by: FAMILY MEDICINE

## 2022-11-16 PROCEDURE — 90694 VACC AIIV4 NO PRSRV 0.5ML IM: CPT | Performed by: FAMILY MEDICINE

## 2022-11-16 RX ORDER — PAROXETINE HYDROCHLORIDE 20 MG/1
20 TABLET, FILM COATED ORAL DAILY
Qty: 90 TABLET | Refills: 1 | Status: SHIPPED | OUTPATIENT
Start: 2022-11-16

## 2022-11-16 RX ORDER — METFORMIN HYDROCHLORIDE 500 MG/1
500 TABLET ORAL 2 TIMES DAILY WITH MEALS
Qty: 180 TABLET | Refills: 3 | Status: SHIPPED | OUTPATIENT
Start: 2022-11-16

## 2022-11-16 NOTE — PROGRESS NOTES
1. \"Have you been to the ER, urgent care clinic since your last visit? Hospitalized since your last visit? \" No    2. \"Have you seen or consulted any other health care providers outside of the [address] [place] [address] since your last visit? \" No     3. For patients aged 39-70: Has the patient had a colonoscopy / FIT/ Cologuard? No      If the patient is female:    4. For patients aged 41-77: Has the patient had a mammogram within the past 2 years? NA - based on age or sex      11. For patients aged 21-65: Has the patient had a pap smear?  No

## 2022-11-16 NOTE — LETTER
11/16/2022 4:16 PM    RE:    [address] [address]      Dear Care Provider    Please fax us the most recent office notes so that we may update the patient's records for continuity of care. Our fax number: [phone]. Patient:   Enos Lefort  1/16/1932        I appreciate your assistance in Ms. Jalloh's care  and look forward to your findings and recommendations.         Sincerely,      Duane Garcia MD

## 2022-11-16 NOTE — PROGRESS NOTES
Gena Jordan is a 80 y.o. female who presents for follow up. Daughter present. Lives with 2 daughters. Patient has dementia. Prior treatment with Aricept. On paxil for anxiety. Per family not as effective. On the 10mg paxil sleep was better initially. Prior sertraline for anxiety. Not tolerated. Took trazodone 50 mg at bedtime, not helpful. Prior melatonin. Eats 3 meals a day, has to feed her at times. Dinner at 6pm, to bed, sleeps until 7am or sometimes until 3pm.       Normal BM. Wears depends. No skin breakdown. Not up at night. No falls. Treated for HTN. BP elevated today. BP high at home. On amlodipine 5mg once a day and metoprolol XL 25mg daily. Treated for HLP. On statin. No myalgias. LDL at goal     Diabetic. On metformin 500mg BID, no GI side effects. Follows diabetic diet. Weight stable. Hba1C 5.8 % June 2022. Up to date on eye exam. Has glaucoma, every 6 months. On drops.           Past Medical History:   Diagnosis Date    DM Renal Manif Type II 2/11/2010    GERD (gastroesophageal reflux disease) 2/11/2010    Glaucoma, open angle 10/29/2010    Headache     HTN (hypertension) 2/11/2010    Hypercholesterolemia     Mixed hyperlipidemia 2/11/2010    Type II or unspecified type diabetes mellitus with renal manifestations, not stated as uncontrolled(250.40) ([facility] [address] 2/11/2010       Family History   Problem Relation Age of Onset    Cancer Mother         lung    Hypertension Mother     Cancer Brother         stomach    Hypertension Brother     Other Father         accidental death when pt was a little girl    Diabetes Brother        Social History     Socioeconomic History    Marital status:      Spouse name: Not on file    Number of children: Not on file    Years of education: Not on file    Highest education level: Not on file   Occupational History    Not on file   Tobacco Use    Smoking status: Never    Smokeless tobacco: Never   Substance and Sexual Activity    Alcohol use: No    Drug use: No    Sexual activity: Not Currently     Partners: Male     Birth control/protection: None   Other Topics Concern    Not on file   Social History Narrative    Not on file     Social Determinants of Health     Financial Resource Strain: Not on file   Food Insecurity: Not on file   Transportation Needs: Not on file   Physical Activity: Not on file   Stress: Not on file   Social Connections: Not on file   Intimate Partner Violence: Not on file   Housing Stability: Not on file       Current Outpatient Medications on File Prior to Visit   Medication Sig Dispense Refill    metoprolol succinate (TOPROL-XL) 25 mg XL tablet Take 1 Tablet by mouth daily. 90 Tablet 3    pravastatin (PRAVACHOL) 40 mg tablet TAKE 1 TABLET BY MOUTH EVERYDAY AT BEDTIME 90 Tablet 3    amLODIPine (NORVASC) 5 mg tablet TAKE 1 TABLET BY MOUTH EVERY DAY 90 Tablet 3    cholecalciferol, vitamin D3, 50 mcg (2,000 unit) tab Take  by mouth.      latanoprost (XALATAN) 0.005 % ophthalmic solution       aspirin delayed-release 81 mg tablet Take 81 mg by mouth daily. Taking every other day  Indications: myocardial infarction prevention      acetaminophen (TYLENOL) 325 mg tablet Take 325-650 mg by mouth every six (6) hours as needed for Pain. Indications: HEADACHE DISORDER      [DISCONTINUED] PARoxetine (PAXIL) 10 mg tablet Take 1 Tablet by mouth daily. 90 Tablet 1    [DISCONTINUED] metFORMIN (GLUCOPHAGE) 500 mg tablet TAKE 1 TABLET BY MOUTH TWICE A DAY WITH MEALS 180 Tablet 3     No current facility-administered medications on file prior to visit. Review of Systems  Pertinent items are noted in HPI. Objective:     Visit Vitals  BP (!) 172/69   Pulse 61   Temp 97.9 °F (36.6 °C) (Temporal)   Resp 16   Ht 5' 6\" (1.676 m)   Wt 151 lb (68.5 kg)   SpO2 97%   BMI 24.37 kg/m²     Gen: well appearing female  HEENT:   PERRL,normal conjunctiva.  External ear and canals normal, TMs no opacification or erythema,  OP no erythema, no exudates, MMM  Neck:  Supple. Thyroid normal size, nontender, without nodules. No masses or LAD  Resp:  No wheezing, no rhonchi, no rales. CV:  RRR, normal A7L4, 3/6 systolic murmur     Extrem:  +2 pulses, no edema, warm distally      Assessment/Plan:       ICD-10-CM ICD-9-CM    1. Controlled type 2 diabetes mellitus without complication, without long-term current use of insulin ([facility])  E11.9 250.00 metFORMIN (GLUCOPHAGE) 500 mg tablet      METABOLIC PANEL, COMPREHENSIVE      CBC W/O DIFF      HEMOGLOBIN A1C WITH EAG      LIPID PANEL      METABOLIC PANEL, COMPREHENSIVE      CBC W/O DIFF      HEMOGLOBIN A1C WITH EAG      LIPID PANEL      2. Mixed hyperlipidemia  E78.2 272.2       3. Open-angle glaucoma, unspecified glaucoma stage, unspecified laterality, unspecified open-angle glaucoma type  H40.10X0 365.10      365.70       4. Stage 3 chronic kidney disease, unspecified whether stage 3a or 3b CKD ([facility])  N18.30 585.3       5. Alzheimer's dementia without behavioral disturbance, psychotic disturbance, mood disturbance, or anxiety, unspecified dementia severity, unspecified timing of dementia onset ([facility]  G30.9 331.0     F02.80 294.10       6.  Anxiety  F41.9 300.00 PARoxetine (PAXIL) 20 mg tablet              Tavo Rosales MD

## 2022-11-16 NOTE — PATIENT INSTRUCTIONS
INCREASE PAXIL (PAROXETINE) TO 2 OF THE 10 MG ONCE A DAY. NEW PRESCRIPTION IS FOR ONE 20MG TABLET DAILY. FOR ANXIETY. Monitor blood pressure. If not improving with treating the anxiety, we need to change BP medication.

## 2022-12-06 DIAGNOSIS — I10 ESSENTIAL HYPERTENSION: ICD-10-CM

## 2022-12-07 RX ORDER — METOPROLOL SUCCINATE 50 MG/1
TABLET, EXTENDED RELEASE ORAL
Qty: 90 TABLET | Refills: 3 | Status: SHIPPED | OUTPATIENT
Start: 2022-12-07

## 2023-02-06 DIAGNOSIS — I10 ESSENTIAL HYPERTENSION: ICD-10-CM

## 2023-02-06 RX ORDER — AMLODIPINE BESYLATE 5 MG/1
TABLET ORAL
Qty: 90 TABLET | Refills: 3 | Status: SHIPPED | OUTPATIENT
Start: 2023-02-06

## 2023-02-06 RX ORDER — METOPROLOL SUCCINATE 25 MG/1
TABLET, EXTENDED RELEASE ORAL
Qty: 90 TABLET | Refills: 3 | Status: SHIPPED | OUTPATIENT
Start: 2023-02-06

## 2023-02-07 NOTE — PROGRESS NOTES
Luiza Salas is a 80 y.o. female who presents for follow up. Daughter present. Lives with 2 daughters. Patient has dementia. Prior treatment with Aricept. On paxil for anxiety. Per family not as effective. On the 10mg paxil sleep was better initially. Prior sertraline for anxiety. Not tolerated. Took trazodone 50 mg at bedtime, not helpful. Prior melatonin. Normal BM. Wears depends. No skin breakdown. Not up at night. No falls. Treated for HTN. BP elevated today. On amlodipine 5mg once a day and metoprolol XL 25mg daily. Treated for HLP. On statin. No myalgias. LDL at goal     Diabetic. On metformin 500mg BID, having diarrhea. Follows diabetic diet. Weight loss 11#. Not eating as much. Hba1C 5.8 % June 2022. Up to date on eye exam. Has glaucoma, every 6 months. On drops.               Past Medical History:   Diagnosis Date    DM Renal Manif Type II 2/11/2010    GERD (gastroesophageal reflux disease) 2/11/2010    Glaucoma, open angle 10/29/2010    Headache     HTN (hypertension) 2/11/2010    Hypercholesterolemia     Mixed hyperlipidemia 2/11/2010    Type II or unspecified type diabetes mellitus with renal manifestations, not stated as uncontrolled(250.40) ([facility] [address] 2/11/2010       Family History   Problem Relation Age of Onset    Cancer Mother         lung    Hypertension Mother     Cancer Brother         stomach    Hypertension Brother     Other Father         accidental death when pt was a little girl    Diabetes Brother        Social History     Socioeconomic History    Marital status:      Spouse name: Not on file    Number of children: Not on file    Years of education: Not on file    Highest education level: Not on file   Occupational History    Not on file   Tobacco Use    Smoking status: Never    Smokeless tobacco: Never   Substance and Sexual Activity    Alcohol use: No    Drug use: No    Sexual activity: Not Currently     Partners: Male     Birth control/protection: None   Other Topics Concern    Not on file   Social History Narrative    Not on file     Social Determinants of Health     Financial Resource Strain: Low Risk     Difficulty of Paying Living Expenses: Not very hard   Food Insecurity: No Food Insecurity    Worried About Running Out of Food in the Last Year: Never true    Ran Out of Food in the Last Year: Never true   Transportation Needs: Not on file   Physical Activity: Not on file   Stress: Not on file   Social Connections: Not on file   Intimate Partner Violence: Not on file   Housing Stability: Not on file       Current Outpatient Medications on File Prior to Visit   Medication Sig Dispense Refill    amLODIPine (NORVASC) 5 mg tablet TAKE 1 TABLET BY MOUTH EVERY DAY 90 Tablet 3    metoprolol succinate (TOPROL-XL) 25 mg XL tablet TAKE 1 TABLET BY MOUTH EVERY DAY 90 Tablet 3    metFORMIN (GLUCOPHAGE) 500 mg tablet Take 1 Tablet by mouth two (2) times daily (with meals). 180 Tablet 3    PARoxetine (PAXIL) 20 mg tablet Take 1 Tablet by mouth daily. 90 Tablet 1    pravastatin (PRAVACHOL) 40 mg tablet TAKE 1 TABLET BY MOUTH EVERYDAY AT BEDTIME 90 Tablet 3    cholecalciferol, vitamin D3, 50 mcg (2,000 unit) tab Take  by mouth.      latanoprost (XALATAN) 0.005 % ophthalmic solution       aspirin delayed-release 81 mg tablet Take 81 mg by mouth daily. Taking every other day  Indications: myocardial infarction prevention      acetaminophen (TYLENOL) 325 mg tablet Take 325-650 mg by mouth every six (6) hours as needed for Pain. Indications: HEADACHE DISORDER      [DISCONTINUED] metoprolol succinate (TOPROL-XL) 50 mg XL tablet TAKE 1 TABLET BY MOUTH EVERY DAY 90 Tablet 3     No current facility-administered medications on file prior to visit. Review of Systems  Pertinent items are noted in HPI.     Objective:     Visit Vitals  BP (!) 147/74   Pulse (!) 57   Temp 97.7 °F (36.5 °C) (Temporal)   Resp 14   Ht 5' 6\" (1.676 m)   Wt 140 lb (63.5 kg)   SpO2 99%   BMI 22.60 kg/m²     Gen: well appearing female  HEENT:   PERRL,normal conjunctiva. External ear and canals normal, TMs no opacification or erythema,  OP no erythema, no exudates, MMM  Neck:  . No masses or LAD  Resp:  No wheezing, no rhonchi, no rales. CV:  RRR, normal S1S2, no murmur. GI: soft, nontender, without masses. Midline hernia   Extrem:  +2 pulses, no edema, warm distally      Assessment/Plan:       ICD-10-CM ICD-9-CM    1. Type 2 diabetes mellitus with chronic kidney disease, without long-term current use of insulin, unspecified CKD stage ([facility])  E11.22 250.40      585.9       2. Alzheimer's dementia without behavioral disturbance, psychotic disturbance, mood disturbance, or anxiety, unspecified dementia severity, unspecified timing of dementia onset ([facility]  G30.9 331.0     F02.80 294.10       3. Stage 3 chronic kidney disease, unspecified whether stage 3a or 3b CKD ([facility])  N18.30 585.3       4. Mixed hyperlipidemia  E78.2 272.2       5. Weight loss  R63.4 783.21           Follow-up and Dispositions    Return in about 2 months (around 4/8/2023) for FOLLOW UP ON ABNORMAL WEIGHT LOSS. Viri Peraza CHECK ON THE METOPROLOL DOSE. RECOMMEND METOPROLOL 25MG DAILY DUE TO HER LOW HEART RATE. CONTINUE AMLODIPINE AT 5MG DAILY. MONITOR BLOOD PRESSURE, GOAL IS ALWAYS UNDER 160. REDUCE METFORMIN TO ONCE A DAY. ADD SUPPLEMENT DRINK IF NOT EATING WELL. GLUCERNA IS FOR DIABETICS.      Lashae Gonzalez MD

## 2023-02-08 ENCOUNTER — OFFICE VISIT (OUTPATIENT)
Dept: INTERNAL MEDICINE CLINIC | Age: 88
End: 2023-02-08
Payer: MEDICARE

## 2023-02-08 VITALS
TEMPERATURE: 97.7 F | BODY MASS INDEX: 22.5 KG/M2 | DIASTOLIC BLOOD PRESSURE: 74 MMHG | HEART RATE: 57 BPM | WEIGHT: 140 LBS | SYSTOLIC BLOOD PRESSURE: 147 MMHG | HEIGHT: 66 IN | OXYGEN SATURATION: 99 % | RESPIRATION RATE: 14 BRPM

## 2023-02-08 DIAGNOSIS — N18.30 STAGE 3 CHRONIC KIDNEY DISEASE, UNSPECIFIED WHETHER STAGE 3A OR 3B CKD (HCC): ICD-10-CM

## 2023-02-08 DIAGNOSIS — F02.80 ALZHEIMER'S DEMENTIA WITHOUT BEHAVIORAL DISTURBANCE, PSYCHOTIC DISTURBANCE, MOOD DISTURBANCE, OR ANXIETY, UNSPECIFIED DEMENTIA SEVERITY, UNSPECIFIED TIMING OF DEMENTIA ONSET (HCC): ICD-10-CM

## 2023-02-08 DIAGNOSIS — E78.2 MIXED HYPERLIPIDEMIA: ICD-10-CM

## 2023-02-08 DIAGNOSIS — R63.4 WEIGHT LOSS: ICD-10-CM

## 2023-02-08 DIAGNOSIS — E11.9 CONTROLLED TYPE 2 DIABETES MELLITUS WITHOUT COMPLICATION, WITHOUT LONG-TERM CURRENT USE OF INSULIN (HCC): ICD-10-CM

## 2023-02-08 DIAGNOSIS — E11.22 TYPE 2 DIABETES MELLITUS WITH CHRONIC KIDNEY DISEASE, WITHOUT LONG-TERM CURRENT USE OF INSULIN, UNSPECIFIED CKD STAGE (HCC): Primary | ICD-10-CM

## 2023-02-08 DIAGNOSIS — G30.9 ALZHEIMER'S DEMENTIA WITHOUT BEHAVIORAL DISTURBANCE, PSYCHOTIC DISTURBANCE, MOOD DISTURBANCE, OR ANXIETY, UNSPECIFIED DEMENTIA SEVERITY, UNSPECIFIED TIMING OF DEMENTIA ONSET (HCC): ICD-10-CM

## 2023-02-08 PROCEDURE — G8420 CALC BMI NORM PARAMETERS: HCPCS | Performed by: FAMILY MEDICINE

## 2023-02-08 PROCEDURE — G8536 NO DOC ELDER MAL SCRN: HCPCS | Performed by: FAMILY MEDICINE

## 2023-02-08 PROCEDURE — 1101F PT FALLS ASSESS-DOCD LE1/YR: CPT | Performed by: FAMILY MEDICINE

## 2023-02-08 PROCEDURE — G8427 DOCREV CUR MEDS BY ELIG CLIN: HCPCS | Performed by: FAMILY MEDICINE

## 2023-02-08 PROCEDURE — G0463 HOSPITAL OUTPT CLINIC VISIT: HCPCS | Performed by: FAMILY MEDICINE

## 2023-02-08 PROCEDURE — G8510 SCR DEP NEG, NO PLAN REQD: HCPCS | Performed by: FAMILY MEDICINE

## 2023-02-08 PROCEDURE — 1090F PRES/ABSN URINE INCON ASSESS: CPT | Performed by: FAMILY MEDICINE

## 2023-02-08 PROCEDURE — 99214 OFFICE O/P EST MOD 30 MIN: CPT | Performed by: FAMILY MEDICINE

## 2023-02-08 RX ORDER — METFORMIN HYDROCHLORIDE 500 MG/1
500 TABLET ORAL
Qty: 90 TABLET | Refills: 1
Start: 2023-02-08

## 2023-02-08 NOTE — PATIENT INSTRUCTIONS
CHECK ON THE METOPROLOL DOSE. RECOMMEND METOPROLOL 25MG DAILY DUE TO HER LOW HEART RATE. CONTINUE AMLODIPINE AT 5MG DAILY. MONITOR BLOOD PRESSURE, GOAL IS ALWAYS UNDER 160. REDUCE METFORMIN TO ONCE A DAY. ADD SUPPLEMENT DRINK IF NOT EATING WELL. GLUCERNA IS FOR DIABETICS.

## 2023-02-08 NOTE — PROGRESS NOTES
1. \"Have you been to the ER, urgent care clinic since your last visit? Hospitalized since your last visit? \" No    2. \"Have you seen or consulted any other health care providers outside of the [address] [place] [address] since your last visit? \" No     3. For patients aged 39-70: Has the patient had a colonoscopy / FIT/ Cologuard? NA - based on age      If the patient is female:    4. For patients aged 41-77: Has the patient had a mammogram within the past 2 years? NA - based on age or sex      11. For patients aged 21-65: Has the patient had a pap smear?  No

## 2023-03-01 ENCOUNTER — APPOINTMENT (OUTPATIENT)
Dept: INTERNAL MEDICINE CLINIC | Age: 88
End: 2023-03-01

## 2023-03-24 ENCOUNTER — TELEPHONE (OUTPATIENT)
Dept: INTERNAL MEDICINE CLINIC | Age: 88
End: 2023-03-24

## 2023-03-24 NOTE — TELEPHONE ENCOUNTER
States please call to advise what to do with bp meds as pt just went to ed on [address] for blood pressure issue    Psr did schedule ed follow up next week,but pt needs to know if they should take extra med or not if bp rises    Please call to advise

## 2023-03-24 NOTE — LETTER
3/24/2023 3:41 PM    Ms. [address] [address] [address] [facility]  Please fax us the most recent ER visit  3/24/2023 so that we may update the patient's records for continuity of care.      Our fax number: [phone]    Patient:   Alondra Munguia  1/16/1932                Sincerely,      Navi Moon MD

## 2023-03-29 ENCOUNTER — OFFICE VISIT (OUTPATIENT)
Dept: INTERNAL MEDICINE CLINIC | Age: 88
End: 2023-03-29
Payer: MEDICARE

## 2023-03-29 VITALS
HEART RATE: 65 BPM | WEIGHT: 150 LBS | SYSTOLIC BLOOD PRESSURE: 169 MMHG | TEMPERATURE: 97.9 F | BODY MASS INDEX: 24.11 KG/M2 | OXYGEN SATURATION: 97 % | DIASTOLIC BLOOD PRESSURE: 60 MMHG | HEIGHT: 66 IN | RESPIRATION RATE: 16 BRPM

## 2023-03-29 DIAGNOSIS — I10 ESSENTIAL HYPERTENSION: Primary | ICD-10-CM

## 2023-03-29 DIAGNOSIS — E11.9 CONTROLLED TYPE 2 DIABETES MELLITUS WITHOUT COMPLICATION, WITHOUT LONG-TERM CURRENT USE OF INSULIN (HCC): ICD-10-CM

## 2023-03-29 DIAGNOSIS — G30.9 ALZHEIMER'S DEMENTIA WITHOUT BEHAVIORAL DISTURBANCE, PSYCHOTIC DISTURBANCE, MOOD DISTURBANCE, OR ANXIETY, UNSPECIFIED DEMENTIA SEVERITY, UNSPECIFIED TIMING OF DEMENTIA ONSET (HCC): ICD-10-CM

## 2023-03-29 DIAGNOSIS — F02.80 ALZHEIMER'S DEMENTIA WITHOUT BEHAVIORAL DISTURBANCE, PSYCHOTIC DISTURBANCE, MOOD DISTURBANCE, OR ANXIETY, UNSPECIFIED DEMENTIA SEVERITY, UNSPECIFIED TIMING OF DEMENTIA ONSET (HCC): ICD-10-CM

## 2023-03-29 PROCEDURE — G8536 NO DOC ELDER MAL SCRN: HCPCS | Performed by: FAMILY MEDICINE

## 2023-03-29 PROCEDURE — 99214 OFFICE O/P EST MOD 30 MIN: CPT | Performed by: FAMILY MEDICINE

## 2023-03-29 PROCEDURE — G8420 CALC BMI NORM PARAMETERS: HCPCS | Performed by: FAMILY MEDICINE

## 2023-03-29 PROCEDURE — G8510 SCR DEP NEG, NO PLAN REQD: HCPCS | Performed by: FAMILY MEDICINE

## 2023-03-29 PROCEDURE — 1101F PT FALLS ASSESS-DOCD LE1/YR: CPT | Performed by: FAMILY MEDICINE

## 2023-03-29 PROCEDURE — G0463 HOSPITAL OUTPT CLINIC VISIT: HCPCS | Performed by: FAMILY MEDICINE

## 2023-03-29 PROCEDURE — G8427 DOCREV CUR MEDS BY ELIG CLIN: HCPCS | Performed by: FAMILY MEDICINE

## 2023-03-29 PROCEDURE — 1090F PRES/ABSN URINE INCON ASSESS: CPT | Performed by: FAMILY MEDICINE

## 2023-03-29 RX ORDER — AMLODIPINE BESYLATE 10 MG/1
10 TABLET ORAL
Qty: 90 TABLET | Refills: 1 | Status: SHIPPED | OUTPATIENT
Start: 2023-03-29

## 2023-03-29 NOTE — PROGRESS NOTES
Yamile Mccurdy is a 80 y.o. female who presents for follow-up after ED visit for hypertension. Daughter present. Patient has dementia. she states states she feels fine     Per daughter, patient was SOB. BP high 202-224  in ED. Given medication in ED, no RX given. BP elevated at 160's at home. Most recent 148/63. Today 169/60. Taking amlodipine 5mg daily and metoprolol XL 25mg daily. Other daughter increased her metoprolol XL 50mg daily, for the past 3-4 days. Had urinary frequency at night, same day seen in ED, no urinary frequency. Eating 3 meals a day, with assistance. Encouraging water intake. Some pedialyte. On paxil 20mg daily for anxiety. Per daughter, less anxious. Prior sertraline for anxiety. Not tolerated. Normal BM. Wears depends. No skin breakdown. Not up at night. No falls. Treated for HLP. On statin. No myalgias. LDL at goal     Diabetic. HbA1C 5.7%. On metformin 500mg once a day. Follows diabetic diet. Up to date on eye exam. Has glaucoma, every 6 months. On drops.          Past Medical History:   Diagnosis Date    DM Renal Manif Type II 2/11/2010    GERD (gastroesophageal reflux disease) 2/11/2010    Glaucoma, open angle 10/29/2010    Headache     HTN (hypertension) 2/11/2010    Hypercholesterolemia     Mixed hyperlipidemia 2/11/2010    Type II or unspecified type diabetes mellitus with renal manifestations, not stated as uncontrolled(250.40) ([facility] [address] 2/11/2010       Family History   Problem Relation Age of Onset    Cancer Mother         lung    Hypertension Mother     Cancer Brother         stomach    Hypertension Brother     Other Father         accidental death when pt was a little girl    Diabetes Brother        Social History     Socioeconomic History    Marital status:      Spouse name: Not on file    Number of children: Not on file    Years of education: Not on file    Highest education level: Not on file   Occupational History    Not on file Tobacco Use    Smoking status: Never    Smokeless tobacco: Never   Substance and Sexual Activity    Alcohol use: No    Drug use: No    Sexual activity: Not Currently     Partners: Male     Birth control/protection: None   Other Topics Concern    Not on file   Social History Narrative    Not on file     Social Determinants of Health     Financial Resource Strain: Low Risk     Difficulty of Paying Living Expenses: Not very hard   Food Insecurity: No Food Insecurity    Worried About Running Out of Food in the Last Year: Never true    Ran Out of Food in the Last Year: Never true   Transportation Needs: Not on file   Physical Activity: Not on file   Stress: Not on file   Social Connections: Not on file   Intimate Partner Violence: Not on file   Housing Stability: Not on file       Current Outpatient Medications on File Prior to Visit   Medication Sig Dispense Refill    metFORMIN (GLUCOPHAGE) 500 mg tablet Take 1 Tablet by mouth daily (with breakfast). 90 Tablet 1    metoprolol succinate (TOPROL-XL) 25 mg XL tablet TAKE 1 TABLET BY MOUTH EVERY DAY 90 Tablet 3    PARoxetine (PAXIL) 20 mg tablet Take 1 Tablet by mouth daily. 90 Tablet 1    pravastatin (PRAVACHOL) 40 mg tablet TAKE 1 TABLET BY MOUTH EVERYDAY AT BEDTIME 90 Tablet 3    cholecalciferol, vitamin D3, 50 mcg (2,000 unit) tab Take  by mouth.      latanoprost (XALATAN) 0.005 % ophthalmic solution       aspirin delayed-release 81 mg tablet Take 81 mg by mouth daily. Taking every other day  Indications: myocardial infarction prevention      acetaminophen (TYLENOL) 325 mg tablet Take 325-650 mg by mouth every six (6) hours as needed for Pain. Indications: HEADACHE DISORDER      [DISCONTINUED] amLODIPine (NORVASC) 5 mg tablet TAKE 1 TABLET BY MOUTH EVERY DAY 90 Tablet 3     No current facility-administered medications on file prior to visit. Review of Systems  Pertinent items are noted in HPI.     Objective:     Visit Vitals  BP (!) 169/60 (BP 1 Location: Right arm)   Pulse 65   Temp 97.9 °F (36.6 °C) (Temporal)   Resp 16   Ht 5' 6\" (1.676 m)   Wt 150 lb (68 kg)   SpO2 97%   BMI 24.21 kg/m²     Gen: well appearing female  HEENT:   PERRL,normal conjunctiva. OP no erythema, no exudates, MMM  Resp:  No wheezing, no rhonchi, no rales. CV:  RRR, normal W6V2, 3/6 systolic murmur. GI: soft, nontender, without masses. Extrem:  no edema, warm distally  Neuro: alert, cognitive deficits      Assessment/Plan:       ICD-10-CM ICD-9-CM    1. Essential hypertension  I10 401.9 amLODIPine (NORVASC) 10 mg tablet      2. Alzheimer's dementia without behavioral disturbance, psychotic disturbance, mood disturbance, or anxiety, unspecified dementia severity, unspecified timing of dementia onset ([facility] [address]  G30.9 331.0     F02.80 294.10       3. Controlled type 2 diabetes mellitus without complication, without long-term current use of insulin ([facility])  E11.9 250.00       CONTINUE METOPROLOL XL AT 25MG DAILY  (AFFECTS BOTH BLOOD PRESSURE AND HEART RATE, BASELINE HEART RATE IS IN 60'S). INCREASE AMLODIPINE TO 10MG ONCE A DAY, CHANGE IT TO BEDTIME. CAN USE 2 OF THE 5MG UNTIL GONE, NEW RX FOR 10MG TABLET. GOAL BLOOD PRESSURE IS STAYING BELOW 160. Follow-up and Dispositions    Return in about 6 months (around 9/29/2023) for followup on medications/medicare wellness.          Loyda Campos MD

## 2023-03-29 NOTE — PROGRESS NOTES
1. \"Have you been to the ER, urgent care clinic since your last visit? Hospitalized since your last visit? \" Yes [facility] ER upper respiratory infection    2. \"Have you seen or consulted any other health care providers outside of the [address] [place] [address] since your last visit? \" Yes [facility]      3. For patients aged 39-70: Has the patient had a colonoscopy / FIT/ Cologuard? NA - based on age      If the patient is female:    4. For patients aged 41-77: Has the patient had a mammogram within the past 2 years? NA - based on age or sex      11. For patients aged 21-65: Has the patient had a pap smear?  NA - based on age or sex

## 2023-03-29 NOTE — PATIENT INSTRUCTIONS
CONTINUE METOPROLOL XL AT 25MG DAILY  (AFFECTS BOTH BLOOD PRESSURE AND HEART RATE, BASELINE HEART RATE IS IN 60'S). INCREASE AMLODIPINE TO 10MG ONCE A DAY, CHANGE IT TO BEDTIME. CAN USE 2 OF THE 5MG UNTIL GONE, NEW RX FOR 10MG TABLET. GOAL BLOOD PRESSURE IS STAYING BELOW 160.

## 2023-05-08 DIAGNOSIS — E78.00 PURE HYPERCHOLESTEROLEMIA, UNSPECIFIED: ICD-10-CM

## 2023-05-08 RX ORDER — PRAVASTATIN SODIUM 40 MG
TABLET ORAL
Qty: 90 TABLET | Refills: 3 | Status: SHIPPED | OUTPATIENT
Start: 2023-05-08

## 2023-05-08 NOTE — TELEPHONE ENCOUNTER
Future Appointments:  No future appointments.      Last Appointment With Me:  3/29/2023     Requested Prescriptions     Pending Prescriptions Disp Refills    pravastatin (PRAVACHOL) 40 MG tablet [Pharmacy Med Name: PRAVASTATIN SODIUM 40 MG TAB] 90 tablet 3     Sig: TAKE 1 TABLET BY MOUTH EVERYDAY AT BEDTIME

## 2023-07-01 ENCOUNTER — APPOINTMENT (OUTPATIENT)
Facility: HOSPITAL | Age: 88
DRG: 884 | End: 2023-07-01
Payer: MEDICARE

## 2023-07-01 ENCOUNTER — HOSPITAL ENCOUNTER (INPATIENT)
Facility: HOSPITAL | Age: 88
LOS: 1 days | Discharge: HOME HEALTH | DRG: 884 | End: 2023-07-04
Attending: EMERGENCY MEDICINE | Admitting: INTERNAL MEDICINE
Payer: MEDICARE

## 2023-07-01 DIAGNOSIS — R53.1 GENERALIZED WEAKNESS: Primary | ICD-10-CM

## 2023-07-01 LAB
ALBUMIN SERPL-MCNC: 3.4 G/DL (ref 3.5–5)
ALBUMIN/GLOB SERPL: 0.7 (ref 1.1–2.2)
ALP SERPL-CCNC: 58 U/L (ref 45–117)
ALT SERPL-CCNC: 11 U/L (ref 12–78)
ANION GAP SERPL CALC-SCNC: 4 MMOL/L (ref 5–15)
APPEARANCE UR: ABNORMAL
AST SERPL-CCNC: 11 U/L (ref 15–37)
BACTERIA URNS QL MICRO: ABNORMAL /HPF
BASOPHILS # BLD: 0 K/UL (ref 0–0.1)
BASOPHILS NFR BLD: 0 % (ref 0–1)
BILIRUB SERPL-MCNC: 0.3 MG/DL (ref 0.2–1)
BILIRUB UR QL: NEGATIVE
BUN SERPL-MCNC: 18 MG/DL (ref 6–20)
BUN/CREAT SERPL: 18 (ref 12–20)
CALCIUM SERPL-MCNC: 9.3 MG/DL (ref 8.5–10.1)
CHLORIDE SERPL-SCNC: 104 MMOL/L (ref 97–108)
CO2 SERPL-SCNC: 29 MMOL/L (ref 21–32)
COLOR UR: ABNORMAL
CREAT SERPL-MCNC: 0.99 MG/DL (ref 0.55–1.02)
DIFFERENTIAL METHOD BLD: ABNORMAL
EOSINOPHIL # BLD: 0 K/UL (ref 0–0.4)
EOSINOPHIL NFR BLD: 0 % (ref 0–7)
EPITH CASTS URNS QL MICRO: ABNORMAL /LPF
ERYTHROCYTE [DISTWIDTH] IN BLOOD BY AUTOMATED COUNT: 15.1 % (ref 11.5–14.5)
GLOBULIN SER CALC-MCNC: 4.9 G/DL (ref 2–4)
GLUCOSE SERPL-MCNC: 182 MG/DL (ref 65–100)
GLUCOSE UR STRIP.AUTO-MCNC: NEGATIVE MG/DL
HCT VFR BLD AUTO: 36.5 % (ref 35–47)
HGB BLD-MCNC: 11.5 G/DL (ref 11.5–16)
HGB UR QL STRIP: ABNORMAL
IMM GRANULOCYTES # BLD AUTO: 0 K/UL (ref 0–0.04)
IMM GRANULOCYTES NFR BLD AUTO: 0 % (ref 0–0.5)
KETONES UR QL STRIP.AUTO: NEGATIVE MG/DL
LEUKOCYTE ESTERASE UR QL STRIP.AUTO: NEGATIVE
LIPASE SERPL-CCNC: 89 U/L (ref 73–393)
LYMPHOCYTES # BLD: 0.5 K/UL (ref 0.8–3.5)
LYMPHOCYTES NFR BLD: 11 % (ref 12–49)
MCH RBC QN AUTO: 27.6 PG (ref 26–34)
MCHC RBC AUTO-ENTMCNC: 31.5 G/DL (ref 30–36.5)
MCV RBC AUTO: 87.7 FL (ref 80–99)
MONOCYTES # BLD: 0.2 K/UL (ref 0–1)
MONOCYTES NFR BLD: 4 % (ref 5–13)
NEUTS SEG # BLD: 3.9 K/UL (ref 1.8–8)
NEUTS SEG NFR BLD: 85 % (ref 32–75)
NITRITE UR QL STRIP.AUTO: NEGATIVE
NRBC # BLD: 0 K/UL (ref 0–0.01)
NRBC BLD-RTO: 0 PER 100 WBC
PH UR STRIP: 5.5 (ref 5–8)
PLATELET # BLD AUTO: 195 K/UL (ref 150–400)
PMV BLD AUTO: 9.6 FL (ref 8.9–12.9)
POTASSIUM SERPL-SCNC: 3.9 MMOL/L (ref 3.5–5.1)
PROT SERPL-MCNC: 8.3 G/DL (ref 6.4–8.2)
PROT UR STRIP-MCNC: 300 MG/DL
RBC # BLD AUTO: 4.16 M/UL (ref 3.8–5.2)
RBC #/AREA URNS HPF: ABNORMAL /HPF (ref 0–5)
RBC MORPH BLD: ABNORMAL
SODIUM SERPL-SCNC: 137 MMOL/L (ref 136–145)
SP GR UR REFRACTOMETRY: 1.02 (ref 1–1.03)
TROPONIN I SERPL HS-MCNC: 13 NG/L (ref 0–51)
URINE CULTURE IF INDICATED: ABNORMAL
UROBILINOGEN UR QL STRIP.AUTO: 1 EU/DL (ref 0.2–1)
WBC # BLD AUTO: 4.6 K/UL (ref 3.6–11)
WBC URNS QL MICRO: ABNORMAL /HPF (ref 0–4)

## 2023-07-01 PROCEDURE — 2500000003 HC RX 250 WO HCPCS: Performed by: EMERGENCY MEDICINE

## 2023-07-01 PROCEDURE — 87077 CULTURE AEROBIC IDENTIFY: CPT

## 2023-07-01 PROCEDURE — 87086 URINE CULTURE/COLONY COUNT: CPT

## 2023-07-01 PROCEDURE — 85025 COMPLETE CBC W/AUTO DIFF WBC: CPT

## 2023-07-01 PROCEDURE — A4216 STERILE WATER/SALINE, 10 ML: HCPCS | Performed by: EMERGENCY MEDICINE

## 2023-07-01 PROCEDURE — 83690 ASSAY OF LIPASE: CPT

## 2023-07-01 PROCEDURE — 6360000004 HC RX CONTRAST MEDICATION: Performed by: STUDENT IN AN ORGANIZED HEALTH CARE EDUCATION/TRAINING PROGRAM

## 2023-07-01 PROCEDURE — 87186 SC STD MICRODIL/AGAR DIL: CPT

## 2023-07-01 PROCEDURE — 81001 URINALYSIS AUTO W/SCOPE: CPT

## 2023-07-01 PROCEDURE — 99285 EMERGENCY DEPT VISIT HI MDM: CPT

## 2023-07-01 PROCEDURE — 96374 THER/PROPH/DIAG INJ IV PUSH: CPT

## 2023-07-01 PROCEDURE — 93005 ELECTROCARDIOGRAM TRACING: CPT | Performed by: EMERGENCY MEDICINE

## 2023-07-01 PROCEDURE — 74177 CT ABD & PELVIS W/CONTRAST: CPT

## 2023-07-01 PROCEDURE — 80053 COMPREHEN METABOLIC PANEL: CPT

## 2023-07-01 PROCEDURE — 70450 CT HEAD/BRAIN W/O DYE: CPT

## 2023-07-01 PROCEDURE — 84484 ASSAY OF TROPONIN QUANT: CPT

## 2023-07-01 PROCEDURE — 2580000003 HC RX 258: Performed by: EMERGENCY MEDICINE

## 2023-07-01 PROCEDURE — 36415 COLL VENOUS BLD VENIPUNCTURE: CPT

## 2023-07-01 RX ORDER — 0.9 % SODIUM CHLORIDE 0.9 %
1000 INTRAVENOUS SOLUTION INTRAVENOUS ONCE
Status: COMPLETED | OUTPATIENT
Start: 2023-07-01 | End: 2023-07-02

## 2023-07-01 RX ORDER — ACETAMINOPHEN 325 MG/1
650 TABLET ORAL EVERY 4 HOURS PRN
Status: DISCONTINUED | OUTPATIENT
Start: 2023-07-01 | End: 2023-07-03 | Stop reason: SDUPTHER

## 2023-07-01 RX ORDER — ONDANSETRON 2 MG/ML
4 INJECTION INTRAMUSCULAR; INTRAVENOUS EVERY 4 HOURS PRN
Status: DISCONTINUED | OUTPATIENT
Start: 2023-07-01 | End: 2023-07-02

## 2023-07-01 RX ADMIN — SODIUM CHLORIDE 1000 ML: 9 INJECTION, SOLUTION INTRAVENOUS at 23:37

## 2023-07-01 RX ADMIN — FAMOTIDINE 20 MG: 10 INJECTION, SOLUTION INTRAVENOUS at 23:34

## 2023-07-01 RX ADMIN — IOPAMIDOL 100 ML: 755 INJECTION, SOLUTION INTRAVENOUS at 20:38

## 2023-07-01 ASSESSMENT — PAIN SCALES - GENERAL: PAINLEVEL_OUTOF10: 0

## 2023-07-01 NOTE — ED PROVIDER NOTES
195 150 - 400 K/uL    MPV 9.6 8.9 - 12.9 FL    Nucleated RBCs 0.0 0  WBC    nRBC 0.00 0.00 - 0.01 K/uL    Neutrophils % 85 (H) 32 - 75 %    Lymphocytes % 11 (L) 12 - 49 %    Monocytes % 4 (L) 5 - 13 %    Eosinophils % 0 0 - 7 %    Basophils % 0 0 - 1 %    Immature Granulocytes 0 0.0 - 0.5 %    Neutrophils Absolute 3.9 1.8 - 8.0 K/UL    Lymphocytes Absolute 0.5 (L) 0.8 - 3.5 K/UL    Monocytes Absolute 0.2 0.0 - 1.0 K/UL    Eosinophils Absolute 0.0 0.0 - 0.4 K/UL    Basophils Absolute 0.0 0.0 - 0.1 K/UL    Absolute Immature Granulocyte 0.0 0.00 - 0.04 K/UL    Differential Type SMEAR SCANNED      RBC Comment NORMOCYTIC, NORMOCHROMIC     Troponin    Collection Time: 07/01/23  7:27 PM   Result Value Ref Range    Troponin, High Sensitivity 13 0 - 51 ng/L   EKG 12 Lead    Collection Time: 07/01/23  7:36 PM   Result Value Ref Range    Ventricular Rate 66 BPM    Atrial Rate 66 BPM    P-R Interval 174 ms    QRS Duration 100 ms    Q-T Interval 444 ms    QTc Calculation (Bazett) 465 ms    P Axis 69 degrees    R Axis 64 degrees    T Axis 270 degrees    Diagnosis       Normal sinus rhythm  Marked ST abnormality, possible inferior subendocardial injury  No previous ECGs available     Urinalysis with Reflex to Culture    Collection Time: 07/01/23 10:28 PM    Specimen: Urine   Result Value Ref Range    Color, UA YELLOW/STRAW      Appearance CLOUDY (A) CLEAR      Specific Gravity, UA 1.020 1.003 - 1.030      pH, Urine 5.5 5.0 - 8.0      Protein,  (A) NEG mg/dL    Glucose, UA Negative NEG mg/dL    Ketones, Urine Negative NEG mg/dL    Bilirubin Urine Negative NEG      Blood, Urine SMALL (A) NEG      Urobilinogen, Urine 1.0 0.2 - 1.0 EU/dL    Nitrite, Urine Negative NEG      Leukocyte Esterase, Urine Negative NEG      WBC, UA 0-4 0 - 4 /hpf    RBC, UA 0-5 0 - 5 /hpf    Epithelial Cells UA FEW FEW /lpf    BACTERIA, URINE 3+ (A) NEG /hpf    Urine Culture if Indicated CULTURE NOT INDICATED BY UA RESULT CNI         EKG:  If

## 2023-07-02 ENCOUNTER — APPOINTMENT (OUTPATIENT)
Facility: HOSPITAL | Age: 88
DRG: 884 | End: 2023-07-02
Payer: MEDICARE

## 2023-07-02 PROBLEM — R53.1 WEAKNESS: Status: ACTIVE | Noted: 2023-07-02

## 2023-07-02 LAB
EST. AVERAGE GLUCOSE BLD GHB EST-MCNC: 108 MG/DL
GLUCOSE BLD STRIP.AUTO-MCNC: 181 MG/DL (ref 65–117)
GLUCOSE BLD STRIP.AUTO-MCNC: 191 MG/DL (ref 65–117)
GLUCOSE BLD STRIP.AUTO-MCNC: 209 MG/DL (ref 65–117)
GLUCOSE BLD STRIP.AUTO-MCNC: 217 MG/DL (ref 65–117)
HBA1C MFR BLD: 5.4 % (ref 4–5.6)
SERVICE CMNT-IMP: ABNORMAL

## 2023-07-02 PROCEDURE — 2500000003 HC RX 250 WO HCPCS: Performed by: INTERNAL MEDICINE

## 2023-07-02 PROCEDURE — G0378 HOSPITAL OBSERVATION PER HR: HCPCS

## 2023-07-02 PROCEDURE — 2580000003 HC RX 258: Performed by: INTERNAL MEDICINE

## 2023-07-02 PROCEDURE — 6360000002 HC RX W HCPCS: Performed by: INTERNAL MEDICINE

## 2023-07-02 PROCEDURE — 36415 COLL VENOUS BLD VENIPUNCTURE: CPT

## 2023-07-02 PROCEDURE — 6370000000 HC RX 637 (ALT 250 FOR IP): Performed by: INTERNAL MEDICINE

## 2023-07-02 PROCEDURE — 83036 HEMOGLOBIN GLYCOSYLATED A1C: CPT

## 2023-07-02 PROCEDURE — 76705 ECHO EXAM OF ABDOMEN: CPT

## 2023-07-02 PROCEDURE — 96372 THER/PROPH/DIAG INJ SC/IM: CPT

## 2023-07-02 PROCEDURE — 82962 GLUCOSE BLOOD TEST: CPT

## 2023-07-02 RX ORDER — AMLODIPINE BESYLATE 5 MG/1
5 TABLET ORAL DAILY
Status: DISCONTINUED | OUTPATIENT
Start: 2023-07-02 | End: 2023-07-03

## 2023-07-02 RX ORDER — DEXTROSE MONOHYDRATE 100 MG/ML
INJECTION, SOLUTION INTRAVENOUS CONTINUOUS PRN
Status: DISCONTINUED | OUTPATIENT
Start: 2023-07-02 | End: 2023-07-04 | Stop reason: HOSPADM

## 2023-07-02 RX ORDER — METOPROLOL SUCCINATE 25 MG/1
25 TABLET, EXTENDED RELEASE ORAL DAILY
Status: DISCONTINUED | OUTPATIENT
Start: 2023-07-02 | End: 2023-07-03

## 2023-07-02 RX ORDER — DOCUSATE SODIUM 100 MG/1
100 CAPSULE, LIQUID FILLED ORAL 2 TIMES DAILY
Status: DISCONTINUED | OUTPATIENT
Start: 2023-07-02 | End: 2023-07-04 | Stop reason: HOSPADM

## 2023-07-02 RX ORDER — METOPROLOL SUCCINATE 50 MG/1
25 TABLET, EXTENDED RELEASE ORAL DAILY
Status: DISCONTINUED | OUTPATIENT
Start: 2023-07-02 | End: 2023-07-02

## 2023-07-02 RX ORDER — LANOLIN ALCOHOL/MO/W.PET/CERES
3 CREAM (GRAM) TOPICAL NIGHTLY PRN
Status: DISCONTINUED | OUTPATIENT
Start: 2023-07-02 | End: 2023-07-04 | Stop reason: HOSPADM

## 2023-07-02 RX ORDER — SODIUM CHLORIDE 9 MG/ML
INJECTION, SOLUTION INTRAVENOUS PRN
Status: DISCONTINUED | OUTPATIENT
Start: 2023-07-02 | End: 2023-07-04 | Stop reason: HOSPADM

## 2023-07-02 RX ORDER — LATANOPROST 50 UG/ML
1 SOLUTION/ DROPS OPHTHALMIC DAILY
Status: DISCONTINUED | OUTPATIENT
Start: 2023-07-02 | End: 2023-07-04 | Stop reason: HOSPADM

## 2023-07-02 RX ORDER — POLYETHYLENE GLYCOL 3350 17 G/17G
17 POWDER, FOR SOLUTION ORAL DAILY
Status: DISCONTINUED | OUTPATIENT
Start: 2023-07-02 | End: 2023-07-04 | Stop reason: HOSPADM

## 2023-07-02 RX ORDER — ENOXAPARIN SODIUM 100 MG/ML
40 INJECTION SUBCUTANEOUS DAILY
Status: DISCONTINUED | OUTPATIENT
Start: 2023-07-02 | End: 2023-07-04 | Stop reason: HOSPADM

## 2023-07-02 RX ORDER — AMLODIPINE BESYLATE 5 MG/1
5 TABLET ORAL DAILY
Status: DISCONTINUED | OUTPATIENT
Start: 2023-07-02 | End: 2023-07-02

## 2023-07-02 RX ORDER — POLYETHYLENE GLYCOL 3350 17 G/17G
17 POWDER, FOR SOLUTION ORAL DAILY PRN
Status: DISCONTINUED | OUTPATIENT
Start: 2023-07-02 | End: 2023-07-02

## 2023-07-02 RX ORDER — ONDANSETRON 2 MG/ML
4 INJECTION INTRAMUSCULAR; INTRAVENOUS EVERY 6 HOURS PRN
Status: DISCONTINUED | OUTPATIENT
Start: 2023-07-02 | End: 2023-07-04 | Stop reason: HOSPADM

## 2023-07-02 RX ORDER — PAROXETINE HYDROCHLORIDE 20 MG/1
20 TABLET, FILM COATED ORAL DAILY
Status: DISCONTINUED | OUTPATIENT
Start: 2023-07-02 | End: 2023-07-04 | Stop reason: HOSPADM

## 2023-07-02 RX ORDER — DEXTROSE MONOHYDRATE, SODIUM CHLORIDE, AND POTASSIUM CHLORIDE 50; 1.49; 4.5 G/1000ML; G/1000ML; G/1000ML
INJECTION, SOLUTION INTRAVENOUS CONTINUOUS
Status: DISCONTINUED | OUTPATIENT
Start: 2023-07-02 | End: 2023-07-04

## 2023-07-02 RX ORDER — INSULIN LISPRO 100 [IU]/ML
0-4 INJECTION, SOLUTION INTRAVENOUS; SUBCUTANEOUS NIGHTLY
Status: DISCONTINUED | OUTPATIENT
Start: 2023-07-02 | End: 2023-07-04 | Stop reason: HOSPADM

## 2023-07-02 RX ORDER — ACETAMINOPHEN 650 MG/1
650 SUPPOSITORY RECTAL EVERY 6 HOURS PRN
Status: DISCONTINUED | OUTPATIENT
Start: 2023-07-02 | End: 2023-07-04 | Stop reason: HOSPADM

## 2023-07-02 RX ORDER — ONDANSETRON 4 MG/1
4 TABLET, ORALLY DISINTEGRATING ORAL EVERY 8 HOURS PRN
Status: DISCONTINUED | OUTPATIENT
Start: 2023-07-02 | End: 2023-07-04 | Stop reason: HOSPADM

## 2023-07-02 RX ORDER — PRAVASTATIN SODIUM 40 MG
40 TABLET ORAL NIGHTLY
Status: DISCONTINUED | OUTPATIENT
Start: 2023-07-02 | End: 2023-07-04 | Stop reason: HOSPADM

## 2023-07-02 RX ORDER — ACETAMINOPHEN 325 MG/1
650 TABLET ORAL EVERY 6 HOURS PRN
Status: DISCONTINUED | OUTPATIENT
Start: 2023-07-02 | End: 2023-07-04 | Stop reason: HOSPADM

## 2023-07-02 RX ORDER — HYDRALAZINE HYDROCHLORIDE 20 MG/ML
10 INJECTION INTRAMUSCULAR; INTRAVENOUS EVERY 6 HOURS PRN
Status: DISCONTINUED | OUTPATIENT
Start: 2023-07-02 | End: 2023-07-04 | Stop reason: HOSPADM

## 2023-07-02 RX ORDER — SODIUM CHLORIDE 0.9 % (FLUSH) 0.9 %
5-40 SYRINGE (ML) INJECTION PRN
Status: DISCONTINUED | OUTPATIENT
Start: 2023-07-02 | End: 2023-07-04 | Stop reason: HOSPADM

## 2023-07-02 RX ORDER — ASPIRIN 81 MG/1
81 TABLET ORAL DAILY
Status: DISCONTINUED | OUTPATIENT
Start: 2023-07-02 | End: 2023-07-04 | Stop reason: HOSPADM

## 2023-07-02 RX ORDER — INSULIN LISPRO 100 [IU]/ML
0-8 INJECTION, SOLUTION INTRAVENOUS; SUBCUTANEOUS
Status: DISCONTINUED | OUTPATIENT
Start: 2023-07-02 | End: 2023-07-04 | Stop reason: HOSPADM

## 2023-07-02 RX ORDER — SORBITOL SOLUTION 70 %
15 SOLUTION, ORAL MISCELLANEOUS ONCE
Status: COMPLETED | OUTPATIENT
Start: 2023-07-02 | End: 2023-07-02

## 2023-07-02 RX ORDER — SODIUM CHLORIDE 0.9 % (FLUSH) 0.9 %
5-40 SYRINGE (ML) INJECTION EVERY 12 HOURS SCHEDULED
Status: DISCONTINUED | OUTPATIENT
Start: 2023-07-02 | End: 2023-07-04 | Stop reason: HOSPADM

## 2023-07-02 RX ADMIN — DOCUSATE SODIUM 100 MG: 100 CAPSULE, LIQUID FILLED ORAL at 20:14

## 2023-07-02 RX ADMIN — POTASSIUM CHLORIDE, DEXTROSE MONOHYDRATE AND SODIUM CHLORIDE: 150; 5; 450 INJECTION, SOLUTION INTRAVENOUS at 00:43

## 2023-07-02 RX ADMIN — ASPIRIN 81 MG: 81 TABLET, COATED ORAL at 09:37

## 2023-07-02 RX ADMIN — SODIUM CHLORIDE, PRESERVATIVE FREE 10 ML: 5 INJECTION INTRAVENOUS at 20:15

## 2023-07-02 RX ADMIN — LATANOPROST 1 DROP: 50 SOLUTION OPHTHALMIC at 09:39

## 2023-07-02 RX ADMIN — Medication 2 UNITS: at 13:15

## 2023-07-02 RX ADMIN — POTASSIUM CHLORIDE, DEXTROSE MONOHYDRATE AND SODIUM CHLORIDE: 150; 5; 450 INJECTION, SOLUTION INTRAVENOUS at 18:38

## 2023-07-02 RX ADMIN — PRAVASTATIN SODIUM 40 MG: 40 TABLET ORAL at 02:06

## 2023-07-02 RX ADMIN — PRAVASTATIN SODIUM 40 MG: 40 TABLET ORAL at 20:14

## 2023-07-02 RX ADMIN — DOCUSATE SODIUM 100 MG: 100 CAPSULE, LIQUID FILLED ORAL at 09:38

## 2023-07-02 RX ADMIN — POLYETHYLENE GLYCOL 3350 17 G: 17 POWDER, FOR SOLUTION ORAL at 09:37

## 2023-07-02 RX ADMIN — AMLODIPINE BESYLATE 5 MG: 5 TABLET ORAL at 03:41

## 2023-07-02 RX ADMIN — PAROXETINE HYDROCHLORIDE 20 MG: 20 TABLET, FILM COATED ORAL at 09:37

## 2023-07-02 RX ADMIN — DOCUSATE SODIUM 100 MG: 100 CAPSULE, LIQUID FILLED ORAL at 02:06

## 2023-07-02 RX ADMIN — ENOXAPARIN SODIUM 40 MG: 100 INJECTION SUBCUTANEOUS at 09:37

## 2023-07-02 RX ADMIN — SORBITOL SOLUTION (BULK) 15 ML: 70 SOLUTION at 09:47

## 2023-07-02 RX ADMIN — SODIUM CHLORIDE, PRESERVATIVE FREE 10 ML: 5 INJECTION INTRAVENOUS at 09:39

## 2023-07-02 NOTE — ED NOTES
Pt's family reports that pt normally ambulates independently (slowly) without assistance. When this RN and tech got pt out of bed to ambulate with walker, pt had an unsteady gait and could not step forward. Assisted pt back in bed and hooked up to cardiac monitor/purewick. Family at bedside with call light within reach.      Sheldon Cheema RN  07/01/23 0177

## 2023-07-02 NOTE — H&P
HISTORY & PHYSICAL  Angel Prieto MD, [address], [place]         NAME: Mary Ann Brandt   :  1932   MRN:  [id number]     Date/Time:  2023 12:17 AM    Patient PCP: Frederic Sanchez MD       Subjective:   CHIEF COMPLAINT: Weakness/unable to walk    HISTORY OF PRESENT ILLNESS:     Eugenio Crump is a 80 y.o. female with history of advanced dementia, hypertension, type 2 diabetes is brought by family for generalized weakness and inability to walk. Patient is quite pleasant however alert and oriented x1 only. Able to recognize her grand daughter and daughter at bedside however has no recollection as to why she is in the hospital, what year is it or her circumstance. In regards to mentation, family states that she is at her baseline. Daughter states that she has had significant weakness just in the last 24 hours. Usually she can walk with minimal assistance or even a cane or walker at home however inability to carry her weight today. No complaints of knee pain, hip pain, abdominal pain, fever/chills, chest pain, shortness of breath. Attempted to ambulate patient with walker and 2/8 however patient unable to bear weight at bedside. Daughter does note 2-3 bouts of vomiting earlier today with dizziness subsequently. In the ED, patient he is afebrile with elevated blood pressures of 200/65 otherwise stable vital signs. CT abdomen/pelvis revealing moderate gastritis with sludge in CBD but not dilated, and bilateral nonobstructing renal calculi along with findings significant for severe constipation. Daughter does state that over the last 3 to 4 weeks, patient's metformin has been discontinued on outpatient basis due to loose stools and since then patient has had less bowel movements on a daily basis.     Counseled to family that patient will be kept in the hospital under observation with significant

## 2023-07-02 NOTE — PROGRESS NOTES
Patient admitted early this AM.  Pt admitted for weakness associated with n/v, severe constipation. Imaging reviewed. Will add sorbital x1 and enema. Obtain abd US to eval for gallbladder pathology.

## 2023-07-02 NOTE — PROGRESS NOTES
End of Shift Note    Bedside shift change report given to Job Hinton (oncoming nurse) by Jersey Martinez RN (offgoing nurse). Report included the following information SBAR, Kardex, Intake/Output, and MAR    Shift worked: 7A - 7P     Shift summary and any significant changes:    Pt came to unit around 0800 and was oriented to unit and made comfortable. Family at bedside during whole shift. Medications given per MAR. IV flushed and patent. Insulin coverage given for lunch. Patient did not complain of pain or nausea during shift. Pt received two enemas and had two medium sized bowel movements.     Concerns for physician to address:      Zone phone for oncoming shift:             Jersey Martinez RN

## 2023-07-03 ENCOUNTER — TELEPHONE (OUTPATIENT)
Age: 88
End: 2023-07-03

## 2023-07-03 PROBLEM — K59.00 CONSTIPATION: Status: ACTIVE | Noted: 2023-07-03

## 2023-07-03 LAB
EKG ATRIAL RATE: 66 BPM
EKG DIAGNOSIS: NORMAL
EKG P AXIS: 69 DEGREES
EKG P-R INTERVAL: 174 MS
EKG Q-T INTERVAL: 444 MS
EKG QRS DURATION: 100 MS
EKG QTC CALCULATION (BAZETT): 465 MS
EKG R AXIS: 64 DEGREES
EKG T AXIS: 270 DEGREES
EKG VENTRICULAR RATE: 66 BPM
GLUCOSE BLD STRIP.AUTO-MCNC: 147 MG/DL (ref 65–117)
GLUCOSE BLD STRIP.AUTO-MCNC: 164 MG/DL (ref 65–117)
GLUCOSE BLD STRIP.AUTO-MCNC: 213 MG/DL (ref 65–117)
GLUCOSE BLD STRIP.AUTO-MCNC: 86 MG/DL (ref 65–117)
SERVICE CMNT-IMP: ABNORMAL
SERVICE CMNT-IMP: NORMAL

## 2023-07-03 PROCEDURE — 6370000000 HC RX 637 (ALT 250 FOR IP): Performed by: NURSE PRACTITIONER

## 2023-07-03 PROCEDURE — 97161 PT EVAL LOW COMPLEX 20 MIN: CPT

## 2023-07-03 PROCEDURE — 97535 SELF CARE MNGMENT TRAINING: CPT

## 2023-07-03 PROCEDURE — G0378 HOSPITAL OBSERVATION PER HR: HCPCS

## 2023-07-03 PROCEDURE — 97530 THERAPEUTIC ACTIVITIES: CPT

## 2023-07-03 PROCEDURE — 6360000002 HC RX W HCPCS: Performed by: INTERNAL MEDICINE

## 2023-07-03 PROCEDURE — 97165 OT EVAL LOW COMPLEX 30 MIN: CPT

## 2023-07-03 PROCEDURE — 82962 GLUCOSE BLOOD TEST: CPT

## 2023-07-03 PROCEDURE — 96372 THER/PROPH/DIAG INJ SC/IM: CPT

## 2023-07-03 PROCEDURE — 6370000000 HC RX 637 (ALT 250 FOR IP): Performed by: INTERNAL MEDICINE

## 2023-07-03 PROCEDURE — 2580000003 HC RX 258: Performed by: INTERNAL MEDICINE

## 2023-07-03 PROCEDURE — 2500000003 HC RX 250 WO HCPCS: Performed by: INTERNAL MEDICINE

## 2023-07-03 RX ORDER — HYDROXYZINE HYDROCHLORIDE 25 MG/1
25 TABLET, FILM COATED ORAL ONCE
Status: COMPLETED | OUTPATIENT
Start: 2023-07-03 | End: 2023-07-03

## 2023-07-03 RX ORDER — LANOLIN ALCOHOL/MO/W.PET/CERES
6 CREAM (GRAM) TOPICAL NIGHTLY
Status: DISCONTINUED | OUTPATIENT
Start: 2023-07-03 | End: 2023-07-04 | Stop reason: HOSPADM

## 2023-07-03 RX ORDER — METOPROLOL SUCCINATE 25 MG/1
12.5 TABLET, EXTENDED RELEASE ORAL DAILY
Status: DISCONTINUED | OUTPATIENT
Start: 2023-07-04 | End: 2023-07-04 | Stop reason: HOSPADM

## 2023-07-03 RX ADMIN — POTASSIUM CHLORIDE, DEXTROSE MONOHYDRATE AND SODIUM CHLORIDE: 150; 5; 450 INJECTION, SOLUTION INTRAVENOUS at 16:29

## 2023-07-03 RX ADMIN — METOPROLOL SUCCINATE 25 MG: 25 TABLET, EXTENDED RELEASE ORAL at 08:48

## 2023-07-03 RX ADMIN — ENOXAPARIN SODIUM 40 MG: 100 INJECTION SUBCUTANEOUS at 08:48

## 2023-07-03 RX ADMIN — ASPIRIN 81 MG: 81 TABLET, COATED ORAL at 08:48

## 2023-07-03 RX ADMIN — LATANOPROST 1 DROP: 50 SOLUTION OPHTHALMIC at 08:50

## 2023-07-03 RX ADMIN — SODIUM CHLORIDE, PRESERVATIVE FREE 10 ML: 5 INJECTION INTRAVENOUS at 08:49

## 2023-07-03 RX ADMIN — AMLODIPINE BESYLATE 5 MG: 5 TABLET ORAL at 08:48

## 2023-07-03 RX ADMIN — Medication 2 UNITS: at 13:59

## 2023-07-03 RX ADMIN — PRAVASTATIN SODIUM 40 MG: 40 TABLET ORAL at 21:26

## 2023-07-03 RX ADMIN — DOCUSATE SODIUM 100 MG: 100 CAPSULE, LIQUID FILLED ORAL at 21:26

## 2023-07-03 RX ADMIN — MELATONIN 6 MG: at 21:26

## 2023-07-03 RX ADMIN — PAROXETINE HYDROCHLORIDE 20 MG: 20 TABLET, FILM COATED ORAL at 08:48

## 2023-07-03 RX ADMIN — SODIUM CHLORIDE, PRESERVATIVE FREE 10 ML: 5 INJECTION INTRAVENOUS at 21:37

## 2023-07-03 RX ADMIN — HYDROXYZINE HYDROCHLORIDE 25 MG: 25 TABLET, FILM COATED ORAL at 05:48

## 2023-07-03 RX ADMIN — POLYETHYLENE GLYCOL 3350 17 G: 17 POWDER, FOR SOLUTION ORAL at 08:47

## 2023-07-03 RX ADMIN — DOCUSATE SODIUM 100 MG: 100 CAPSULE, LIQUID FILLED ORAL at 08:48

## 2023-07-03 ASSESSMENT — PAIN SCALES - GENERAL
PAINLEVEL_OUTOF10: 0
PAINLEVEL_OUTOF10: 0

## 2023-07-03 NOTE — TELEPHONE ENCOUNTER
[address] [facility] states that pt is discharging from hospital today,     Nursing, pt and ot. Is doctor willing to follow? Please call to let her know.

## 2023-07-03 NOTE — PROGRESS NOTES
Physical Therapy    PT evaluation completed, full report to follow. Pt with symptomatic orthostatic hypotension limiting tolerance to mobility training this date. Pt below functional baseline with increased risk for fall. Will benefit from acute PT for activity/ mobility progression as tolerated. Recommend 24/7 assist, RW, BSC, HH PT. Pt endorses access to w/c. Of note, family reports pt has not had BM without use of enema thus far.     Devon Tatum, PT, MPT

## 2023-07-03 NOTE — PLAN OF CARE
Problem: Physical Therapy - Adult  Goal: By Discharge: Performs mobility at highest level of function for planned discharge setting. See evaluation for individualized goals. Description: FUNCTIONAL STATUS PRIOR TO ADMISSION: Pt lives with family who provides 24/7 assist. At baseline, pt ambulates in home with supervision, no use of device. Requires cues to complete ADLs due to dementia/ decreased initiation and cognition. Family endorses access to w/c which they use to take pt out in community. Physical Therapy Goals  Initiated 7/3/2023  1. Patient will move from supine to sit and sit to supine in bed with minimal assistance within 7 day(s). 2.  Patient will perform sit to stand with moderate assistance within 7 day(s). 3.  Patient will transfer from bed to chair and chair to bed with moderate assistance using the least restrictive device within 7 day(s). 4.  Patient will ambulate with moderate assistance for 25 feet with the least restrictive device within 7 day(s). Outcome: Progressing   PHYSICAL THERAPY EVALUATION    Patient: Erick Donato (73 y.o. female)  Date: 7/3/2023  Primary Diagnosis: Weakness [R53.1]  Generalized weakness [R53.1]       Precautions: Fall Risk, General Precautions, Bed Alarm (orthostatic hypotension 40 pt SBP drop/symptomatic; only having BM w/enema- no normal BM w/admit for constipation; dementia w/24/7 care)                    ASSESSMENT :   DEFICITS/IMPAIRMENTS:   The patient is limited by decreased functional mobility, strength, activity tolerance, cognition, attention/concentration, coordination, balance following admission for weakness, n/v, constipation. Pt received with supportive family present. Oriented x 1, pleasant and cooperative throughout, fatigued quickly with activity. Mobility assessment limited by symptomatic orthostatic hypotension this date. Completed bed mob with min/ mod A.  Required intermittent assist to maintain sitting balance, 2 person

## 2023-07-03 NOTE — PLAN OF CARE
Problem: Occupational Therapy - Adult  Goal: By Discharge: Performs self-care activities at highest level of function for planned discharge setting. See evaluation for individualized goals. Description: FUNCTIONAL STATUS PRIOR TO ADMISSION:  Patient was ambulatory using no DME, household distances consistently  [place] Help From: Family, Friend(s), ADL Assistance: Needs assistance, Bath: Dependent/Total, Dressing: Dependent/Total, Grooming: Dependent/Total, Feeding: Dependent/Total, Toileting: Needs assistance (wears pull ups; changes ~ 3x/day Unaware of needs at times),  , Ambulation Assistance: Independent (S level for cognition but mod I and no DME used/needed), Transfer Assistance: Independent, Active : No     HOME SUPPORT: Patient lived with dtr; \"large family- she will have help 24/7. Occupational Therapy Goals:  Initiated 7/3/2023  1. Patient will perform self-feeding 50% with Supervision within 7 day(s). 2.  Patient will perform grooming with Moderate Assist within 7 day(s). 3.  Patient will perform upper body dressing with Moderate Assist within 7 day(s). 4.  Patient will perform toilet transfers with Moderate Assist  within 7 day(s). 5.  Patient will perform all aspects of toileting with Moderate Assist within 7 day(s). 6.  Patient will participate in upper extremity therapeutic exercise/activities with Moderate Assist for 5 minutes within 7 day(s). 7.  Patient will utilize fall prevention techniques with hypotension during functional activities with verbal cues within 7 day(s).    Outcome: Progressing   OCCUPATIONAL THERAPY EVALUATION    Patient: Annette Morton (19 y.o. female)  Date: 7/3/2023  Primary Diagnosis: Weakness [R53.1]  Generalized weakness [R53.1]         Precautions: Fall Risk, General Precautions, Bed Alarm (orthostatic hypotension 40 pt SBP drop/symptomatic; only having BM w/enema- no normal BM w/admit for constipation; dementia w/24/7 care)

## 2023-07-03 NOTE — PROGRESS NOTES
.End of Shift Note    Bedside shift change report given to Janice Beck (oncoming nurse) by Carolyn Smith RN (offgoing nurse). Report included the following information SBAR, Kardex, and MAR    Shift worked: 7a-7p     Shift summary and any significant changes:    Patient drowsy throughout the day, previously restless and agitated on night shift. Medications given per [facility] and education has been provided. Pt is incontinent with a purewick. Family present at bedside and provided with updates. Discharge cancelled due to orthostatic hypotension, pt is to wear compression stockings during the day and off at night. Hourly rounding completed and pt is resting quietly.           Carolyn Smith RN

## 2023-07-03 NOTE — CARE COORDINATION
Care Management Initial Assessment       RUR: NA-Observation  Readmission? No  1st IM letter given? No  1st  letter given: No     07/03/23 0952   Service Assessment   Patient Orientation Person   Cognition Severely Impaired   History Provided By Child/Family  (Patient's daughterJuliet  [phone])   Primary Caregiver Friend   Accompanied By/Relationship Patient's daughter, [address]  (The patient has 3 daughter, Patient's daughter, Juliet Collins is the main contact)   [address] is: Legal Next of [address] [facility]   PCP Verified by CM Yes   Last Visit to PCP Within last 3 months  (PCP was last seen within 2 month)   Prior Functional Level Assistance with the following:;Mobility; Bathing;Dressing; Toileting;Feeding;Housework;Cooking; Shopping   Current Functional Level Shopping;Housework;Cooking;Feeding; Toileting;Dressing; Bathing;Assistance with the following:;Mobility   Can patient return to prior living arrangement Yes   Ability to make needs known: Unable   Family able to assist with home care needs: Yes   Would you like for me to discuss the discharge plan with any other family members/significant others, and if so, who? Yes  (Patient's daughter, Juliet Collins  [phone])   [address]; Medicare   Community Resources None   Social/Functional History   Lives With Daughter   Type of [facility]  One level   Home Access Stairs to enter with rails   Entrance Stairs - Number of Steps 3   Entrance Stairs - [address] None   Active  No   Patient's  Info Patient's daughter, Juliet Collins   Occupation Retired   Discharge Planning   Type of [address]  Prior To Admission None   [address]   Type of [address] Services;PT;OT;Nursing Services   Patient expects to be discharged to: [facility]

## 2023-07-03 NOTE — PROGRESS NOTES
End of Shift Note    Bedside shift change report given to Matilde Boggs RN (oncoming nurse) by Brandon Young LPN (offgoing nurse). Report included the following information SBAR, Kardex, and MAR    Shift worked:  23:00-7:30     Shift summary and any significant changes:    Pt was agitated through out shift off and on. At 05am pt became persistent about getting out of bed and roaming barber, care tech walked pt in barber in the wheel chair. NP was notified and one time dose oral atrax ordered for pt 25mg. Atrax administered and pt return to bed and rested for rest of shift. Pt care provided, routine rounding, and pt repositions self, heels elevated. Family at bedside. New IV placed in Right A/C 20g. Pt output charted, zinc cream applied to groin and sacral.     No am labs ordered.       Concerns for physician to address:       Zone phone for oncoming shift:                Brandon Young LPN

## 2023-07-03 NOTE — PROGRESS NOTES
Hospitalist Progress Note    NAME:   Mary Ann Brandt   : 1932   MRN: [id number]     Date/Time: 7/3/2023 1:19 PM  Patient PCP: Frederic Sanchez MD    Estimated discharge date:   Barriers: Improvement on orthostatic hypotension      Assessment / Plan:  Generalized weakness  Inability to ambulate without any findings or sequelae of acute trauma or fractures. Likely secondary to nausea/vomiting and severe constipation superimposed on advanced dementia. PT/OT evaluated, patient had symptomatic orthostatic hypotension and was unable to tolerate related training. Recommend  assist, RW, BSC, HH PT.     Nausea/vomiting/ severe constipation  Nausea and vomiting was likely secondary to severe constipation as noted on CT abdomen/pelvis. Constipation is resolved as patient received multiple enemas resulting in multiple BM. Discussed with family that she will need to be on a bowel regimen daily moving forward   Continue Colace and MiraLAX. No further nausea/ vomiting since admission. Continue gentle IV fluids due to multiple episodes of vomiting today. Type 2 diabetes  Metformin has been discontinued on outpatient basis. Continue on corrective dose insulin. Hypertension  Symptomatic orthostatic hypotension  Given patient's advanced age, would not strictly control her blood pressure. We will discontinue Norvasc and reduce Toprol XL to 12.5 mg daily  Apply compression stocking    Advanced dementia/depression  Continue Paxil. Supportive care  Nightly to help relate sleep            Medical Decision Making:   I personally reviewed labs  I personally reviewed imaging  Toxic drug monitoring, Lovenox injection, monitor hemoglobin  I personally reviewed EKG  Discussed case with: Family, RN, CM, discussed plan of care and dispo during interdisciplinary round        Code Status: Full  DVT Prophylaxis:   GI Prophylaxis:    Subjective:   Patient was confused overnight requiring a dose of Atarax.   She ________________________________________________________________________  Total NON critical care TIME: 45  Minutes    Total CRITICAL CARE TIME Spent:   Minutes non procedure based      Comments   >50% of visit spent in counseling and coordination of care     ________________________________________________________________________  Maria De Jesus Rivera MD     Procedures: see electronic medical records for all procedures/Xrays and details which were not copied into this note but were reviewed prior to creation of Plan. LABS:  I reviewed today's most current labs and imaging studies.   Pertinent labs include:  Recent Labs     07/01/23 1927   WBC 4.6   HGB 11.5   HCT 36.5        Recent Labs     07/01/23 1927      K 3.9      CO2 29   GLUCOSE 182*   BUN 18   CREATININE 0.99   CALCIUM 9.3   LABALBU 3.4*   BILITOT 0.3   AST 11*   ALT 11*       Signed: Maria De Jesus Rivera MD

## 2023-07-04 VITALS
TEMPERATURE: 98.2 F | DIASTOLIC BLOOD PRESSURE: 88 MMHG | RESPIRATION RATE: 18 BRPM | OXYGEN SATURATION: 99 % | WEIGHT: 157 LBS | BODY MASS INDEX: 25.34 KG/M2 | SYSTOLIC BLOOD PRESSURE: 151 MMHG | HEART RATE: 55 BPM

## 2023-07-04 PROBLEM — I95.1 ORTHOSTATIC HYPOTENSION: Status: ACTIVE | Noted: 2023-07-04

## 2023-07-04 LAB
BACTERIA SPEC CULT: ABNORMAL
BACTERIA SPEC CULT: ABNORMAL
CC UR VC: ABNORMAL
GLUCOSE BLD STRIP.AUTO-MCNC: 141 MG/DL (ref 65–117)
GLUCOSE BLD STRIP.AUTO-MCNC: 145 MG/DL (ref 65–117)
SERVICE CMNT-IMP: ABNORMAL

## 2023-07-04 PROCEDURE — 6370000000 HC RX 637 (ALT 250 FOR IP): Performed by: INTERNAL MEDICINE

## 2023-07-04 PROCEDURE — 1100000000 HC RM PRIVATE

## 2023-07-04 PROCEDURE — 82962 GLUCOSE BLOOD TEST: CPT

## 2023-07-04 PROCEDURE — G0378 HOSPITAL OBSERVATION PER HR: HCPCS

## 2023-07-04 PROCEDURE — 6360000002 HC RX W HCPCS: Performed by: INTERNAL MEDICINE

## 2023-07-04 PROCEDURE — 2500000003 HC RX 250 WO HCPCS: Performed by: INTERNAL MEDICINE

## 2023-07-04 PROCEDURE — 96372 THER/PROPH/DIAG INJ SC/IM: CPT

## 2023-07-04 PROCEDURE — 2580000003 HC RX 258: Performed by: INTERNAL MEDICINE

## 2023-07-04 RX ORDER — POLYETHYLENE GLYCOL 3350 17 G/17G
17 POWDER, FOR SOLUTION ORAL DAILY
Qty: 30 EACH | Refills: 0 | Status: SHIPPED | OUTPATIENT
Start: 2023-07-05 | End: 2023-08-04

## 2023-07-04 RX ORDER — METOPROLOL SUCCINATE 25 MG/1
12.5 TABLET, EXTENDED RELEASE ORAL DAILY
Qty: 30 TABLET | Refills: 0 | Status: SHIPPED | OUTPATIENT
Start: 2023-07-04

## 2023-07-04 RX ORDER — PSEUDOEPHEDRINE HCL 30 MG
100 TABLET ORAL 2 TIMES DAILY
Qty: 60 CAPSULE | Refills: 0 | Status: SHIPPED | OUTPATIENT
Start: 2023-07-04 | End: 2023-08-03

## 2023-07-04 RX ADMIN — POLYETHYLENE GLYCOL 3350 17 G: 17 POWDER, FOR SOLUTION ORAL at 08:42

## 2023-07-04 RX ADMIN — ASPIRIN 81 MG: 81 TABLET, COATED ORAL at 08:42

## 2023-07-04 RX ADMIN — DOCUSATE SODIUM 100 MG: 100 CAPSULE, LIQUID FILLED ORAL at 08:42

## 2023-07-04 RX ADMIN — LATANOPROST 1 DROP: 50 SOLUTION OPHTHALMIC at 08:43

## 2023-07-04 RX ADMIN — ENOXAPARIN SODIUM 40 MG: 100 INJECTION SUBCUTANEOUS at 08:43

## 2023-07-04 RX ADMIN — POTASSIUM CHLORIDE, DEXTROSE MONOHYDRATE AND SODIUM CHLORIDE: 150; 5; 450 INJECTION, SOLUTION INTRAVENOUS at 00:47

## 2023-07-04 RX ADMIN — METOPROLOL SUCCINATE 12.5 MG: 25 TABLET, EXTENDED RELEASE ORAL at 08:42

## 2023-07-04 RX ADMIN — PAROXETINE HYDROCHLORIDE 20 MG: 20 TABLET, FILM COATED ORAL at 08:43

## 2023-07-04 RX ADMIN — SODIUM CHLORIDE, PRESERVATIVE FREE 10 ML: 5 INJECTION INTRAVENOUS at 08:43

## 2023-07-04 ASSESSMENT — PAIN SCALES - GENERAL
PAINLEVEL_OUTOF10: 0
PAINLEVEL_OUTOF10: 0

## 2023-07-04 NOTE — PROGRESS NOTES
I have reviewed discharge instructions with the patient and caregiver. The patient and caregiver verbalized understanding. Discharge medications reviewed with patient and caregiver and appropriate educational materials and side effects teaching were provided. Follow-up appointments reviewed. Opportunity for questions and clarification was provided. Venous access removed without difficulty. Patient's belongings gathered and sent with patient. Patient is ready for discharge. Pt discharged home with daughter via [facility] at 65.      Marguerite Mora RN

## 2023-07-04 NOTE — DISCHARGE SUMMARY
Discharge Summary    Name: Ernst Adams  [id number]  YOB: 1932 (Age: 80 y.o.)   Date of Admission: 7/1/2023  Date of Discharge: 7/4/2023  Attending Physician: Ayush Horvath MD    Discharge Diagnosis:   Generalized weakness  Nausea/vomiting/ severe constipation   Type 2 diabetes   Hypertension  Symptomatic orthostatic hypotension  Dementia    Consultations:  IP CONSULT TO HOSPITALIST  IP CONSULT TO PHYSICAL THERAPY  IP CONSULT TO OCCUPATIONAL THERAPY  IP CONSULT TO CASE MANAGEMENT      Brief Admission History/Reason for Admission Per Ayush Horvath MD:   Keysha Mauricio is a 80 y.o. female with history of advanced dementia, hypertension, type 2 diabetes is brought by family for generalized weakness and inability to walk. Patient is quite pleasant however alert and oriented x1 only. Able to recognize her grand daughter and daughter at bedside however has no recollection as to why she is in the hospital, what year is it or her circumstance. In regards to mentation, family states that she is at her baseline. Daughter states that she has had significant weakness just in the last 24 hours. Usually she can walk with minimal assistance or even a cane or walker at home however inability to carry her weight today. No complaints of knee pain, hip pain, abdominal pain, fever/chills, chest pain, shortness of breath. Attempted to ambulate patient with walker and 2/8 however patient unable to bear weight at bedside. Daughter does note 2-3 bouts of vomiting earlier today with dizziness subsequently. In the ED, patient he is afebrile with elevated blood pressures of 200/65 otherwise stable vital signs. CT abdomen/pelvis revealing moderate gastritis with sludge in CBD but not dilated, and bilateral nonobstructing renal calculi along with findings significant for severe constipation.   Daughter does state that over the last 3 to 4 weeks, patient's metformin has been

## 2023-07-04 NOTE — PROGRESS NOTES
.End of Shift Note     Bedside shift change report given to [address] (oncoming nurse) by Brenda Mitchell RN (offgoing nurse). Report included the following information SBAR, Kardex, and MAR     Shift worked: 7p-7a       Pt is A&ox1. Pt reported no pain and showed no signs of respiratory or physical distress. Pt is receiving iv fluids. Pt is resting comfortably in bed. Family member at bedside all night. Call light is within reach.

## 2023-07-05 ENCOUNTER — TELEPHONE (OUTPATIENT)
Age: 88
End: 2023-07-05

## 2023-07-05 NOTE — TELEPHONE ENCOUNTER
Patients daughter called in stating patient was admitted to ER on Saturday 7/1 and was released from [address] [place] [address] yesterday 7/4 and provider took her off all diabetes meds and BP meds. Daughter would like for  to review info and confirm to daughter that patient is able to stop these meds. I offered to schedule a hosp f/up appt and daughter declined stating they have a home nurse coming out to her and PT since patient is unable to walk.

## 2023-07-06 ENCOUNTER — TELEPHONE (OUTPATIENT)
Age: 88
End: 2023-07-06

## 2023-07-06 NOTE — PROGRESS NOTES
Physician Progress Note      PATIENTAlyse Conception  CSN #:                  [id number]  :                       1932  ADMIT DATE:       2023 6:37 PM  [facility] DATE:        2023 5:20 PM  RESPONDING  PROVIDER #:        Malgorzata Bolton MD          QUERY TEXT:    Patient admitted to observation status on   for orthostatic hypotension. Noted inpatient admission order on . If possible, please document in   progress notes and discharge summary the reason for inpatient admission. The medical record reflects the following:  Risk Factors: 89f  Clinical Indicators: pn-admitted for weakness associated with n/v, severe   constipation. Imaging reviewed. 7/3 pn-Symptomatic orthostatic hypotension,   Given patient's advanced age, would not strictly control her blood pressure.   Treatment: \"discontinue Norvasc and reduce Toprol XL\"  compression stockings,   ivfs dc'd  Thanks, Sree Ludwig RN/CDI [id number]  Options provided:  -- Inpatient admission for Orthostatic hypotension  -- Inpatient admission for Weakness due to dementia  -- Other - I will add my own diagnosis  -- Disagree - Not applicable / Not valid  -- Disagree - Clinically unable to determine / Unknown  -- Refer to Clinical Documentation Reviewer    PROVIDER RESPONSE TEXT:    This patient was admitted inpatient for Weakness due to dementia    Query created by: Sree Ludwig on 2023 9:26 AM      Electronically signed by:  Malgorzata Bolton MD 2023 2:50 PM

## 2023-07-06 NOTE — TELEPHONE ENCOUNTER
[facility]   [phone]         States:  Started care for: Skilled nursing, PT, and OT      (No callback needed unless questions)                                Caller confirms readback of documented phone/fax number(s) as correct.

## 2023-07-07 ENCOUNTER — TELEPHONE (OUTPATIENT)
Age: 88
End: 2023-07-07

## 2023-07-07 NOTE — TELEPHONE ENCOUNTER
----- Message from Ivonne Babin sent at 7/7/2023 11:38 AM EDT -----  Subject: Hospital Follow Up    QUESTIONS  What hospital was the Patient Discharged from? [facility]   Date of Discharge? 2023-07-04  Discharge Location? Home  Reason for hospitalization as patient stated? Pt was hospitalized for   constipation. Pt is receiving physical therapy at home. What question does the patient have, if applicable? Pt would like to   schedule a hospital follow up appt.   ---------------------------------------------------------------------------  --------------  Jason Marine Rory  What is the best way for the office to contact you? OK to leave message on   voicemail  Preferred Call Back Phone Number? [phone]  ---------------------------------------------------------------------------  --------------  SCRIPT ANSWERS  Relationship to Patient? Other/Third Party  Representative Name? Dameon Daughter  Additional information verified (besides Name and Date of Birth)?  Phone   Number

## 2023-07-07 NOTE — TELEPHONE ENCOUNTER
Pt daughter returned call    Would like to schedule after all    Transferred to team psr Gearldean Heimlich.

## 2023-07-19 ENCOUNTER — OFFICE VISIT (OUTPATIENT)
Age: 88
End: 2023-07-19

## 2023-07-19 VITALS
SYSTOLIC BLOOD PRESSURE: 181 MMHG | HEIGHT: 66 IN | WEIGHT: 154 LBS | DIASTOLIC BLOOD PRESSURE: 67 MMHG | OXYGEN SATURATION: 98 % | HEART RATE: 58 BPM | RESPIRATION RATE: 16 BRPM | TEMPERATURE: 97.6 F | BODY MASS INDEX: 24.75 KG/M2

## 2023-07-19 DIAGNOSIS — Z91.81 AT HIGH RISK FOR FALLS: ICD-10-CM

## 2023-07-19 DIAGNOSIS — I10 UNCONTROLLED HYPERTENSION: Primary | ICD-10-CM

## 2023-07-19 DIAGNOSIS — F03.90 DEMENTIA WITHOUT BEHAVIORAL DISTURBANCE (HCC): ICD-10-CM

## 2023-07-19 DIAGNOSIS — E11.9 TYPE 2 DIABETES MELLITUS WITHOUT COMPLICATION, WITHOUT LONG-TERM CURRENT USE OF INSULIN (HCC): ICD-10-CM

## 2023-07-19 DIAGNOSIS — E78.2 MIXED HYPERLIPIDEMIA: ICD-10-CM

## 2023-07-19 RX ORDER — AMLODIPINE BESYLATE 2.5 MG/1
2.5 TABLET ORAL
Qty: 90 TABLET | Refills: 1 | Status: SHIPPED | OUTPATIENT
Start: 2023-07-19

## 2023-07-19 SDOH — ECONOMIC STABILITY: HOUSING INSECURITY
IN THE LAST 12 MONTHS, WAS THERE A TIME WHEN YOU DID NOT HAVE A STEADY PLACE TO SLEEP OR SLEPT IN A SHELTER (INCLUDING NOW)?: NO

## 2023-07-19 SDOH — ECONOMIC STABILITY: FOOD INSECURITY: WITHIN THE PAST 12 MONTHS, THE FOOD YOU BOUGHT JUST DIDN'T LAST AND YOU DIDN'T HAVE MONEY TO GET MORE.: NEVER TRUE

## 2023-07-19 SDOH — ECONOMIC STABILITY: INCOME INSECURITY: HOW HARD IS IT FOR YOU TO PAY FOR THE VERY BASICS LIKE FOOD, HOUSING, MEDICAL CARE, AND HEATING?: NOT HARD AT ALL

## 2023-07-19 SDOH — ECONOMIC STABILITY: FOOD INSECURITY: WITHIN THE PAST 12 MONTHS, YOU WORRIED THAT YOUR FOOD WOULD RUN OUT BEFORE YOU GOT MONEY TO BUY MORE.: NEVER TRUE

## 2023-07-19 ASSESSMENT — PATIENT HEALTH QUESTIONNAIRE - PHQ9
SUM OF ALL RESPONSES TO PHQ9 QUESTIONS 1 & 2: 0
SUM OF ALL RESPONSES TO PHQ QUESTIONS 1-9: 0
2. FEELING DOWN, DEPRESSED OR HOPELESS: 0
1. LITTLE INTEREST OR PLEASURE IN DOING THINGS: 0
SUM OF ALL RESPONSES TO PHQ QUESTIONS 1-9: 0

## 2023-07-19 NOTE — PATIENT INSTRUCTIONS
Reduce the miralax to 1/2 of usual dose once a day. Continue docusate sodium (colace stool softener)     Resume amlodipine at 2.5mg one at bedtime. Goal BP is always under 160 upper number.

## 2023-07-19 NOTE — PROGRESS NOTES
1. \"Have you been to the ER, urgent care clinic since your last visit? Hospitalized since your last visit? \" Yes 7/4/2023 Weak     2. \"Have you seen or consulted any other health care providers outside of the [address] [place] [address] since your last visit? \" No      3. For patients aged 43-73: Has the patient had a colonoscopy / FIT/ Cologuard? No      If the patient is female:    4. For patients aged 43-66: Has the patient had a mammogram within the past 2 years? No       5. For patients aged 21-65: Has the patient had a pap smear?  No

## 2023-09-13 DIAGNOSIS — I10 ESSENTIAL (PRIMARY) HYPERTENSION: ICD-10-CM

## 2023-09-13 DIAGNOSIS — F41.9 ANXIETY DISORDER, UNSPECIFIED: ICD-10-CM

## 2023-09-13 RX ORDER — AMLODIPINE BESYLATE 10 MG/1
10 TABLET ORAL
Qty: 90 TABLET | Refills: 1 | Status: SHIPPED | OUTPATIENT
Start: 2023-09-13

## 2023-09-13 RX ORDER — PAROXETINE HYDROCHLORIDE 20 MG/1
20 TABLET, FILM COATED ORAL DAILY
Qty: 90 TABLET | Refills: 1 | Status: SHIPPED | OUTPATIENT
Start: 2023-09-13

## 2023-10-08 ENCOUNTER — TELEPHONE (OUTPATIENT)
Age: 88
End: 2023-10-08

## 2023-10-10 NOTE — PROGRESS NOTES
Armando Pulido is a 80 y.o. female who presents for ED follow up. Family reports fall with fractured ribs. Was walking at home without an assist device, fell and hit a table. Fractured ribs on right. Seen at ED on 301. Patient denies pain. Daughter reports she is complaining of back pain with movement. Is now getting up with assistance now. Given hydrocodone 5mg, 1/2 tablet twice a day, none today. Past Medical History:   Diagnosis Date    GERD (gastroesophageal reflux disease) 2/11/2010    Glaucoma, open angle 10/29/2010    Headache     HTN (hypertension) 2/11/2010    Hypercholesterolemia     Mixed hyperlipidemia 2/11/2010    Type II or unspecified type diabetes mellitus with renal manifestations, not stated as uncontrolled(250.40) 2/11/2010    Type II or unspecified type diabetes mellitus with renal manifestations, not stated as uncontrolled(250.40) 2/11/2010       Family History   Problem Relation Age of Onset    Diabetes Brother     Other Father         accidental death when pt was a little girl    Cancer Mother         lung    Hypertension Mother     Hypertension Brother     Cancer Brother         stomach        Social History     Socioeconomic History    Marital status:       Spouse name: Not on file    Number of children: Not on file    Years of education: Not on file    Highest education level: Not on file   Occupational History    Not on file   Tobacco Use    Smoking status: Never    Smokeless tobacco: Never   Substance and Sexual Activity    Alcohol use: No    Drug use: No    Sexual activity: Not on file   Other Topics Concern    Not on file   Social History Narrative    Not on file     Social Determinants of Health     Financial Resource Strain: Low Risk  (10/11/2023)    Overall Financial Resource Strain (CARDIA)     Difficulty of Paying Living Expenses: Not hard at all   Food Insecurity: No Food Insecurity (10/11/2023)    Hunger Vital Sign     Worried About Running Out of

## 2023-10-11 ENCOUNTER — OFFICE VISIT (OUTPATIENT)
Age: 88
End: 2023-10-11
Payer: MEDICARE

## 2023-10-11 VITALS
BODY MASS INDEX: 24.86 KG/M2 | HEART RATE: 60 BPM | TEMPERATURE: 97.3 F | DIASTOLIC BLOOD PRESSURE: 61 MMHG | HEIGHT: 66 IN | OXYGEN SATURATION: 98 % | SYSTOLIC BLOOD PRESSURE: 121 MMHG

## 2023-10-11 DIAGNOSIS — S22.31XD CLOSED FRACTURE OF ONE RIB OF RIGHT SIDE WITH ROUTINE HEALING, SUBSEQUENT ENCOUNTER: Primary | ICD-10-CM

## 2023-10-11 DIAGNOSIS — Z23 NEEDS FLU SHOT: ICD-10-CM

## 2023-10-11 PROCEDURE — 1036F TOBACCO NON-USER: CPT | Performed by: FAMILY MEDICINE

## 2023-10-11 PROCEDURE — 99213 OFFICE O/P EST LOW 20 MIN: CPT | Performed by: FAMILY MEDICINE

## 2023-10-11 PROCEDURE — 1123F ACP DISCUSS/DSCN MKR DOCD: CPT | Performed by: FAMILY MEDICINE

## 2023-10-11 PROCEDURE — G8427 DOCREV CUR MEDS BY ELIG CLIN: HCPCS | Performed by: FAMILY MEDICINE

## 2023-10-11 PROCEDURE — G8420 CALC BMI NORM PARAMETERS: HCPCS | Performed by: FAMILY MEDICINE

## 2023-10-11 PROCEDURE — 90694 VACC AIIV4 NO PRSRV 0.5ML IM: CPT | Performed by: FAMILY MEDICINE

## 2023-10-11 PROCEDURE — G8484 FLU IMMUNIZE NO ADMIN: HCPCS | Performed by: FAMILY MEDICINE

## 2023-10-11 PROCEDURE — 1090F PRES/ABSN URINE INCON ASSESS: CPT | Performed by: FAMILY MEDICINE

## 2023-10-11 PROCEDURE — PBSHW INFLUENZA, FLUAD, (AGE 65 Y+), IM, PF, 0.5 ML: Performed by: FAMILY MEDICINE

## 2023-10-11 RX ORDER — HYDROCODONE BITARTRATE AND ACETAMINOPHEN 5; 325 MG/1; MG/1
TABLET ORAL
COMMUNITY
Start: 2023-10-08

## 2023-10-11 SDOH — ECONOMIC STABILITY: INCOME INSECURITY: HOW HARD IS IT FOR YOU TO PAY FOR THE VERY BASICS LIKE FOOD, HOUSING, MEDICAL CARE, AND HEATING?: NOT HARD AT ALL

## 2023-10-11 SDOH — ECONOMIC STABILITY: FOOD INSECURITY: WITHIN THE PAST 12 MONTHS, YOU WORRIED THAT YOUR FOOD WOULD RUN OUT BEFORE YOU GOT MONEY TO BUY MORE.: NEVER TRUE

## 2023-10-11 SDOH — ECONOMIC STABILITY: FOOD INSECURITY: WITHIN THE PAST 12 MONTHS, THE FOOD YOU BOUGHT JUST DIDN'T LAST AND YOU DIDN'T HAVE MONEY TO GET MORE.: NEVER TRUE

## 2023-10-11 ASSESSMENT — PATIENT HEALTH QUESTIONNAIRE - PHQ9
SUM OF ALL RESPONSES TO PHQ9 QUESTIONS 1 & 2: 0
1. LITTLE INTEREST OR PLEASURE IN DOING THINGS: 0
SUM OF ALL RESPONSES TO PHQ QUESTIONS 1-9: 0
SUM OF ALL RESPONSES TO PHQ QUESTIONS 1-9: 0
2. FEELING DOWN, DEPRESSED OR HOPELESS: 0
SUM OF ALL RESPONSES TO PHQ QUESTIONS 1-9: 0
SUM OF ALL RESPONSES TO PHQ QUESTIONS 1-9: 0

## 2023-10-11 NOTE — PATIENT INSTRUCTIONS
Continue hydrocodone 1/2 tablet twice a day until prescription done, then switch to tylenol 500mg 1-2 tablets twice a day as needed. Can use ice to chest wall for pain. Up with assistance only. Try to get her to use walker. Cancel appt on 10/23, reschedule for 3 months. Try claritin 10mg daily for runny nose.

## 2023-10-11 NOTE — PROGRESS NOTES
1. \"Have you been to the ER, urgent care clinic since your last visit? Hospitalized since your last visit? ED [facility], Last Saturday, fell broken rib on the [address] Side. 2. \"Have you seen or consulted any other health care providers outside of the [address] [place] [address] Avenue since your last visit? yes, [facility]    3. For patients aged 43-73: Has the patient had a colonoscopy / FIT/ Cologuard? NA - based on age      If the patient is female:    4. For patients aged 43-66: Has the patient had a mammogram within the past 2 years? NA - based on age or sex      11. For patients aged 21-65: Has the patient had a pap smear?  NA - based on age or sex

## 2023-10-11 NOTE — PROGRESS NOTES
After obtaining consent, and per verbal order from Dr. Adali Garcia patient received influenza vaccine given by Alexandre Wu in left Deltoid. Influenza Vaccine 0.5 mL IM now. Patient was observed for 10 minutes post injection. Patient tolerated injection well.        Alexandre Wu LPN

## 2023-10-31 DIAGNOSIS — I10 UNCONTROLLED HYPERTENSION: ICD-10-CM

## 2023-11-01 RX ORDER — AMLODIPINE BESYLATE 2.5 MG/1
TABLET ORAL
Qty: 90 TABLET | Refills: 1 | Status: SHIPPED | OUTPATIENT
Start: 2023-11-01

## 2024-01-31 ENCOUNTER — HOSPITAL ENCOUNTER (EMERGENCY)
Facility: HOSPITAL | Age: 89
Discharge: HOME | End: 2024-01-31
Attending: EMERGENCY MEDICINE
Payer: MEDICARE

## 2024-01-31 ENCOUNTER — APPOINTMENT (OUTPATIENT)
Facility: HOSPITAL | Age: 89
End: 2024-01-31
Payer: MEDICARE

## 2024-01-31 VITALS
HEART RATE: 72 BPM | HEIGHT: 65 IN | SYSTOLIC BLOOD PRESSURE: 161 MMHG | DIASTOLIC BLOOD PRESSURE: 48 MMHG | OXYGEN SATURATION: 91 % | TEMPERATURE: 98.1 F | BODY MASS INDEX: 27.11 KG/M2 | WEIGHT: 162.7 LBS | RESPIRATION RATE: 17 BRPM

## 2024-01-31 DIAGNOSIS — J06.9 ACUTE UPPER RESPIRATORY INFECTION: Primary | ICD-10-CM

## 2024-01-31 DIAGNOSIS — R03.0 ELEVATED BLOOD PRESSURE READING: ICD-10-CM

## 2024-01-31 LAB
ALBUMIN SERPL-MCNC: 3.4 G/DL (ref 3.5–5)
ALBUMIN/GLOB SERPL: 0.7 (ref 1.1–2.2)
ALP SERPL-CCNC: 80 U/L (ref 45–117)
ALT SERPL-CCNC: 81 U/L (ref 12–78)
ANION GAP SERPL CALC-SCNC: 3 MMOL/L (ref 5–15)
APPEARANCE UR: CLEAR
AST SERPL-CCNC: 83 U/L (ref 15–37)
BACTERIA URNS QL MICRO: NEGATIVE /HPF
BASOPHILS # BLD: 0 K/UL (ref 0–0.1)
BASOPHILS NFR BLD: 0 % (ref 0–1)
BILIRUB SERPL-MCNC: 0.3 MG/DL (ref 0.2–1)
BILIRUB UR QL: NEGATIVE
BUN SERPL-MCNC: 30 MG/DL (ref 6–20)
BUN/CREAT SERPL: 27 (ref 12–20)
CALCIUM SERPL-MCNC: 8.6 MG/DL (ref 8.5–10.1)
CHLORIDE SERPL-SCNC: 107 MMOL/L (ref 97–108)
CO2 SERPL-SCNC: 28 MMOL/L (ref 21–32)
COLOR UR: ABNORMAL
CREAT SERPL-MCNC: 1.13 MG/DL (ref 0.55–1.02)
DIFFERENTIAL METHOD BLD: ABNORMAL
EKG ATRIAL RATE: 72 BPM
EKG DIAGNOSIS: NORMAL
EKG P AXIS: 89 DEGREES
EKG P-R INTERVAL: 176 MS
EKG Q-T INTERVAL: 408 MS
EKG QRS DURATION: 96 MS
EKG QTC CALCULATION (BAZETT): 446 MS
EKG R AXIS: 99 DEGREES
EKG T AXIS: -61 DEGREES
EKG VENTRICULAR RATE: 72 BPM
EOSINOPHIL # BLD: 0.1 K/UL (ref 0–0.4)
EOSINOPHIL NFR BLD: 1 % (ref 0–7)
EPITH CASTS URNS QL MICRO: ABNORMAL /LPF
ERYTHROCYTE [DISTWIDTH] IN BLOOD BY AUTOMATED COUNT: 14.6 % (ref 11.5–14.5)
FLUAV AG NPH QL IA: NEGATIVE
FLUBV AG NOSE QL IA: NEGATIVE
GLOBULIN SER CALC-MCNC: 5 G/DL (ref 2–4)
GLUCOSE SERPL-MCNC: 207 MG/DL (ref 65–100)
GLUCOSE UR STRIP.AUTO-MCNC: NEGATIVE MG/DL
HCT VFR BLD AUTO: 38 % (ref 35–47)
HGB BLD-MCNC: 12 G/DL (ref 11.5–16)
HGB UR QL STRIP: ABNORMAL
HYALINE CASTS URNS QL MICRO: ABNORMAL /LPF (ref 0–2)
IMM GRANULOCYTES # BLD AUTO: 0.1 K/UL (ref 0–0.04)
IMM GRANULOCYTES NFR BLD AUTO: 1 % (ref 0–0.5)
KETONES UR QL STRIP.AUTO: NEGATIVE MG/DL
LEUKOCYTE ESTERASE UR QL STRIP.AUTO: NEGATIVE
LYMPHOCYTES # BLD: 0.6 K/UL (ref 0.8–3.5)
LYMPHOCYTES NFR BLD: 8 % (ref 12–49)
MCH RBC QN AUTO: 28.7 PG (ref 26–34)
MCHC RBC AUTO-ENTMCNC: 31.6 G/DL (ref 30–36.5)
MCV RBC AUTO: 90.9 FL (ref 80–99)
MONOCYTES # BLD: 0.4 K/UL (ref 0–1)
MONOCYTES NFR BLD: 5 % (ref 5–13)
NEUTS SEG # BLD: 6.9 K/UL (ref 1.8–8)
NEUTS SEG NFR BLD: 85 % (ref 32–75)
NITRITE UR QL STRIP.AUTO: NEGATIVE
NRBC # BLD: 0 K/UL (ref 0–0.01)
NRBC BLD-RTO: 0 PER 100 WBC
PH UR STRIP: 5.5 (ref 5–8)
PLATELET # BLD AUTO: 187 K/UL (ref 150–400)
PMV BLD AUTO: 9.6 FL (ref 8.9–12.9)
POTASSIUM SERPL-SCNC: 4.7 MMOL/L (ref 3.5–5.1)
PROT SERPL-MCNC: 8.4 G/DL (ref 6.4–8.2)
PROT UR STRIP-MCNC: 100 MG/DL
RBC # BLD AUTO: 4.18 M/UL (ref 3.8–5.2)
RBC #/AREA URNS HPF: ABNORMAL /HPF (ref 0–5)
RBC MORPH BLD: ABNORMAL
SARS-COV-2 RDRP RESP QL NAA+PROBE: NOT DETECTED
SODIUM SERPL-SCNC: 138 MMOL/L (ref 136–145)
SOURCE: NORMAL
SP GR UR REFRACTOMETRY: 1.01
URINE CULTURE IF INDICATED: ABNORMAL
UROBILINOGEN UR QL STRIP.AUTO: 0.2 EU/DL (ref 0.2–1)
WBC # BLD AUTO: 8.1 K/UL (ref 3.6–11)
WBC URNS QL MICRO: ABNORMAL /HPF (ref 0–4)

## 2024-01-31 PROCEDURE — 81001 URINALYSIS AUTO W/SCOPE: CPT

## 2024-01-31 PROCEDURE — 85025 COMPLETE CBC W/AUTO DIFF WBC: CPT

## 2024-01-31 PROCEDURE — 99285 EMERGENCY DEPT VISIT HI MDM: CPT

## 2024-01-31 PROCEDURE — 87804 INFLUENZA ASSAY W/OPTIC: CPT

## 2024-01-31 PROCEDURE — 36415 COLL VENOUS BLD VENIPUNCTURE: CPT

## 2024-01-31 PROCEDURE — 93005 ELECTROCARDIOGRAM TRACING: CPT | Performed by: EMERGENCY MEDICINE

## 2024-01-31 PROCEDURE — 87635 SARS-COV-2 COVID-19 AMP PRB: CPT

## 2024-01-31 PROCEDURE — 80053 COMPREHEN METABOLIC PANEL: CPT

## 2024-01-31 PROCEDURE — 6370000000 HC RX 637 (ALT 250 FOR IP): Performed by: EMERGENCY MEDICINE

## 2024-01-31 PROCEDURE — 2580000003 HC RX 258: Performed by: EMERGENCY MEDICINE

## 2024-01-31 PROCEDURE — 71045 X-RAY EXAM CHEST 1 VIEW: CPT

## 2024-01-31 RX ORDER — 0.9 % SODIUM CHLORIDE 0.9 %
1000 INTRAVENOUS SOLUTION INTRAVENOUS ONCE
Status: COMPLETED | OUTPATIENT
Start: 2024-01-31 | End: 2024-01-31

## 2024-01-31 RX ORDER — AZITHROMYCIN 250 MG/1
250 TABLET, FILM COATED ORAL DAILY
Qty: 4 TABLET | Refills: 0 | Status: SHIPPED | OUTPATIENT
Start: 2024-01-31 | End: 2024-02-04

## 2024-01-31 RX ORDER — CLONIDINE HYDROCHLORIDE 0.1 MG/1
0.1 TABLET ORAL
Status: DISCONTINUED | OUTPATIENT
Start: 2024-01-31 | End: 2024-01-31

## 2024-01-31 RX ORDER — AZITHROMYCIN 250 MG/1
500 TABLET, FILM COATED ORAL
Status: COMPLETED | OUTPATIENT
Start: 2024-01-31 | End: 2024-01-31

## 2024-01-31 RX ADMIN — AZITHROMYCIN 500 MG: 250 TABLET, FILM COATED ORAL at 02:58

## 2024-01-31 RX ADMIN — SODIUM CHLORIDE 1000 ML: 9 INJECTION, SOLUTION INTRAVENOUS at 02:57

## 2024-01-31 ASSESSMENT — PAIN - FUNCTIONAL ASSESSMENT: PAIN_FUNCTIONAL_ASSESSMENT: ADULT NONVERBAL PAIN SCALE (NPVS)

## 2024-01-31 ASSESSMENT — ENCOUNTER SYMPTOMS
NAUSEA: 0
SHORTNESS OF BREATH: 0
VOMITING: 0
DIARRHEA: 0
ABDOMINAL PAIN: 0

## 2024-01-31 NOTE — ED PROVIDER NOTES
EMERGENCY DEPARTMENT HISTORY AND PHYSICAL EXAM     ----------------------------------------------------------------------------  Please note that this dictation was completed with [employer] voice recognition software.  Quite often unanticipated grammatical, syntax, homophones, and other interpretive errors are inadvertently transcribed by the computer software.  Please disregard these errors.  Please excuse any errors that have escaped final proofreading  ----------------------------------------------------------------------------      Date: 1/31/2024  Patient Name: Keysha Pacheco      HISTORY OF PRESENT ILLNESS     Chief Complaint   Patient presents with    Hypertension     Pt arrives via EMS from home. Family went to take VS and noted high systolic 230/91. EMS reports BP of 140/90. . Pt hx of dementia (A/O self). Told family that she wasn't feeling well.        History obtainted from:  Patient    Other independent source of history: Family    HPI: Keysha Pacheco is a 89 y.o. female, with significant pmhx of dementia, hypertension, reflux, cholesterol, type 2 diabetes without current medication who presents via private vehicle to the ED with c/o elevated blood pressure reading at home and complaints of \"feeling poorly\" without specificity.  Patient notes that she feels well at time of my initial evaluation and has no complaints.  Specifically denies having chest pain, feeling short of breath, recent fever, belly pain, nausea or vomiting or diarrhea.  Family notes that she did not miss any of her medications for her blood pressure.  Notes that she was previously on metformin but was subsequently taken off many months ago.  Family felt that the patient was clamped and checked her blood sugar which was found to be in the 300s.      PCP: April Bowles MD    Allergy List:   Allergies   Allergen Reactions    Ace Inhibitors Cough    Codeine Nausea And Vomiting         Medications:  Current

## 2024-01-31 NOTE — DISCHARGE INSTRUCTIONS
It was a pleasure taking care of you in our Emergency Department today.  We know that when you come to [facility], you are entrusting us with your health, comfort, and safety.  Our physicians and nurses honor that trust, and truly appreciate the opportunity to care for you and your loved ones.      We also value your feedback.  If you receive a survey about your Emergency Department experience today, please fill it out.  We care about our patients' feedback, and we listen to what you have to say.  Thank you!      Dr. Aishwarya England MD.

## 2024-01-31 NOTE — ED NOTES
Family at beside. Cardiac monitor x3. Call light within reach.    Took home BP (10mg Amlodipine) and Cholesterol meds around 6PM.

## 2024-02-12 ENCOUNTER — OFFICE VISIT (OUTPATIENT)
Age: 89
End: 2024-02-12
Payer: MEDICARE

## 2024-02-12 VITALS
BODY MASS INDEX: 27.16 KG/M2 | WEIGHT: 163 LBS | TEMPERATURE: 97.8 F | HEART RATE: 61 BPM | DIASTOLIC BLOOD PRESSURE: 71 MMHG | RESPIRATION RATE: 18 BRPM | SYSTOLIC BLOOD PRESSURE: 144 MMHG | HEIGHT: 65 IN | OXYGEN SATURATION: 97 %

## 2024-02-12 DIAGNOSIS — E78.2 MIXED HYPERLIPIDEMIA: ICD-10-CM

## 2024-02-12 DIAGNOSIS — E11.9 TYPE 2 DIABETES MELLITUS WITHOUT COMPLICATION, WITHOUT LONG-TERM CURRENT USE OF INSULIN (HCC): ICD-10-CM

## 2024-02-12 DIAGNOSIS — F41.9 ANXIETY: ICD-10-CM

## 2024-02-12 DIAGNOSIS — Z00.00 MEDICARE ANNUAL WELLNESS VISIT, SUBSEQUENT: Primary | ICD-10-CM

## 2024-02-12 DIAGNOSIS — I10 ESSENTIAL HYPERTENSION, BENIGN: ICD-10-CM

## 2024-02-12 DIAGNOSIS — F03.90 DEMENTIA WITHOUT BEHAVIORAL DISTURBANCE (HCC): ICD-10-CM

## 2024-02-12 PROBLEM — N18.30 CHRONIC RENAL DISEASE, STAGE III (HCC): Status: RESOLVED | Noted: 2022-11-16 | Resolved: 2024-02-12

## 2024-02-12 PROBLEM — E11.22 TYPE 2 DIABETES MELLITUS WITH CHRONIC KIDNEY DISEASE (HCC): Status: RESOLVED | Noted: 2021-11-16 | Resolved: 2024-02-12

## 2024-02-12 PROBLEM — E11.21 TYPE 2 DIABETES MELLITUS WITH NEPHROPATHY (HCC): Status: RESOLVED | Noted: 2017-12-23 | Resolved: 2024-02-12

## 2024-02-12 PROCEDURE — 99214 OFFICE O/P EST MOD 30 MIN: CPT | Performed by: FAMILY MEDICINE

## 2024-02-12 PROCEDURE — G8484 FLU IMMUNIZE NO ADMIN: HCPCS | Performed by: FAMILY MEDICINE

## 2024-02-12 PROCEDURE — G8419 CALC BMI OUT NRM PARAM NOF/U: HCPCS | Performed by: FAMILY MEDICINE

## 2024-02-12 PROCEDURE — G0439 PPPS, SUBSEQ VISIT: HCPCS | Performed by: FAMILY MEDICINE

## 2024-02-12 PROCEDURE — G8427 DOCREV CUR MEDS BY ELIG CLIN: HCPCS | Performed by: FAMILY MEDICINE

## 2024-02-12 PROCEDURE — 1090F PRES/ABSN URINE INCON ASSESS: CPT | Performed by: FAMILY MEDICINE

## 2024-02-12 PROCEDURE — 1123F ACP DISCUSS/DSCN MKR DOCD: CPT | Performed by: FAMILY MEDICINE

## 2024-02-12 PROCEDURE — 1036F TOBACCO NON-USER: CPT | Performed by: FAMILY MEDICINE

## 2024-02-12 RX ORDER — HYDROXYZINE HYDROCHLORIDE 10 MG/1
10 TABLET, FILM COATED ORAL 3 TIMES DAILY PRN
Qty: 30 TABLET | Refills: 1 | Status: SHIPPED | OUTPATIENT
Start: 2024-02-12

## 2024-02-12 NOTE — PROGRESS NOTES
Keysha Pacheco is a 89 y.o. female who presents for follow up. In with daughter.      Fell recently.  Lost balance.  No injury this time.  Unsteady when walking unassisted. Not using assist device.  In wheelchair today.      Advanced dementia. Needs help with most ADLs. Eating 3 meals a day, with assistance.  Drinking water, 3-4 glasses a day.  Wears depends.     Treated for HTN. BP controlled.       On paxil 20mg daily for anxiety.     Treated for HLP. On statin. No myalgias.  LDL at goal     Diabetic. HbA1C 5.4% in hospital. .  Off metformin.  Follows diabetic diet.      Up to date on eye exam. Has glaucoma, every 6 months. On drops.           Past Medical History:   Diagnosis Date    GERD (gastroesophageal reflux disease) 2/11/2010    Glaucoma, open angle 10/29/2010    Headache     HTN (hypertension) 2/11/2010    Hypercholesterolemia     Mixed hyperlipidemia 2/11/2010    Type II or unspecified type diabetes mellitus with renal manifestations, not stated as uncontrolled(250.40) 2/11/2010    Type II or unspecified type diabetes mellitus with renal manifestations, not stated as uncontrolled(250.40) 2/11/2010       Family History   Problem Relation Age of Onset    Diabetes Brother     Other Father         accidental death when pt was a little girl    Cancer Mother         lung    Hypertension Mother     Hypertension Brother     Cancer Brother         stomach        Social History     Socioeconomic History    Marital status:      Spouse name: Not on file    Number of children: Not on file    Years of education: Not on file    Highest education level: Not on file   Occupational History    Not on file   Tobacco Use    Smoking status: Never    Smokeless tobacco: Never   Substance and Sexual Activity    Alcohol use: No    Drug use: No    Sexual activity: Not Currently     Birth control/protection: None   Other Topics Concern    Not on file   Social History Narrative    Not on file     Social Determinants of

## 2024-02-16 ENCOUNTER — TELEPHONE (OUTPATIENT)
Age: 89
End: 2024-02-16

## 2024-03-06 DIAGNOSIS — I10 ESSENTIAL (PRIMARY) HYPERTENSION: ICD-10-CM

## 2024-03-07 RX ORDER — METOPROLOL SUCCINATE 25 MG/1
25 TABLET, EXTENDED RELEASE ORAL DAILY
Qty: 90 TABLET | Refills: 3 | Status: SHIPPED | OUTPATIENT
Start: 2024-03-07

## 2024-04-24 DIAGNOSIS — F41.9 ANXIETY DISORDER, UNSPECIFIED: ICD-10-CM

## 2024-04-25 DIAGNOSIS — I10 ESSENTIAL (PRIMARY) HYPERTENSION: ICD-10-CM

## 2024-04-25 DIAGNOSIS — F41.9 ANXIETY DISORDER, UNSPECIFIED: ICD-10-CM

## 2024-04-25 RX ORDER — PAROXETINE HYDROCHLORIDE 20 MG/1
20 TABLET, FILM COATED ORAL DAILY
Qty: 90 TABLET | Refills: 1 | Status: SHIPPED | OUTPATIENT
Start: 2024-04-25

## 2024-04-25 RX ORDER — AMLODIPINE BESYLATE 10 MG/1
10 TABLET ORAL DAILY
Qty: 90 TABLET | Refills: 1 | Status: SHIPPED | OUTPATIENT
Start: 2024-04-25

## 2024-04-25 NOTE — TELEPHONE ENCOUNTER
Caller requests Refill of:  PARoxetine (PAXIL) 20 MG tablet  amLODIPine (NORVASC) 10 MG tablet  (pharm needs this updated dosage)      Please send to:    [facility]/pharmacy #[id number] - [place], [place] - [address] - P [phone] - F [phone] [address] [address]  Phone: [phone] Fax: [phone]         Visit / Appointment History:  Future Appointment at [facility]:  Visit date not found, due to Return in 6 months (around  8/12/2024).      Last Appointment With PCP:  2/12/2024       Caller confirmed instructions and dosages as correct.    Caller was advised that Meds will be refilled as soon as possible, however there can be a 48-72 business hour turn around on refill requests.

## 2024-04-25 NOTE — TELEPHONE ENCOUNTER
PCP: April Bowles MD    Last appt:   2/12/2024    No future appointments.    Requested Prescriptions     Pending Prescriptions Disp Refills    PARoxetine (PAXIL) 20 MG tablet 90 tablet 1     Sig: Take 1 tablet by mouth daily    amLODIPine (NORVASC) 10 MG tablet 90 tablet 1     Sig: Take 1 tablet by mouth daily

## 2024-05-17 ENCOUNTER — TELEPHONE (OUTPATIENT)
Age: 89
End: 2024-05-17

## 2024-05-17 DIAGNOSIS — F41.9 ANXIETY: ICD-10-CM

## 2024-05-17 NOTE — TELEPHONE ENCOUNTER
Zehra would like doctor to prescribe something for anxiety.  Can something be sent to pharm to calm pt down?    Send to [facility] #[phone]   Amaris Locke.       Zehra would like a call back to let her know what doctor will do.  Thanks.

## 2024-05-19 RX ORDER — HYDROXYZINE HYDROCHLORIDE 10 MG/1
10 TABLET, FILM COATED ORAL 3 TIMES DAILY PRN
Qty: 30 TABLET | Refills: 1 | Status: SHIPPED | OUTPATIENT
Start: 2024-05-19

## 2024-05-19 NOTE — TELEPHONE ENCOUNTER
Patient is currently on paroxetine (Paxil) 20 mg once daily for anxiety.  She also has received hydroxyzine 10 mg 1 tablet 3 times a day as needed for breakthrough anxiety.  A refill on hydroxyzine has been sent to the pharmacy.  I would not recommend any stronger medication for her anxiety given the risk of oversedation at her advanced age.

## 2024-05-20 NOTE — TELEPHONE ENCOUNTER
Spoke to pt's daughter and used two pt identifiers.  Patient notified of response from April Bowles MD    States understanding

## 2024-07-01 DIAGNOSIS — E78.00 PURE HYPERCHOLESTEROLEMIA, UNSPECIFIED: ICD-10-CM

## 2024-07-01 RX ORDER — PRAVASTATIN SODIUM 40 MG
40 TABLET ORAL NIGHTLY
Qty: 90 TABLET | Refills: 3 | Status: SHIPPED | OUTPATIENT
Start: 2024-07-01

## 2024-07-17 DIAGNOSIS — I10 UNCONTROLLED HYPERTENSION: ICD-10-CM

## 2024-07-17 RX ORDER — AMLODIPINE BESYLATE 2.5 MG/1
TABLET ORAL
Qty: 90 TABLET | Refills: 1 | Status: SHIPPED | OUTPATIENT
Start: 2024-07-17

## 2024-08-17 ENCOUNTER — APPOINTMENT (OUTPATIENT)
Facility: HOSPITAL | Age: 89
End: 2024-08-17
Payer: MEDICARE

## 2024-08-17 ENCOUNTER — HOSPITAL ENCOUNTER (EMERGENCY)
Facility: HOSPITAL | Age: 89
Discharge: HOME | End: 2024-08-18
Payer: MEDICARE

## 2024-08-17 DIAGNOSIS — E86.0 DEHYDRATION: ICD-10-CM

## 2024-08-17 DIAGNOSIS — N17.9 AKI (ACUTE KIDNEY INJURY) (HCC): Primary | ICD-10-CM

## 2024-08-17 LAB
GLUCOSE BLD STRIP.AUTO-MCNC: 218 MG/DL (ref 65–117)
SERVICE CMNT-IMP: ABNORMAL

## 2024-08-17 PROCEDURE — 36415 COLL VENOUS BLD VENIPUNCTURE: CPT

## 2024-08-17 PROCEDURE — 87636 SARSCOV2 & INF A&B AMP PRB: CPT

## 2024-08-17 PROCEDURE — 85025 COMPLETE CBC W/AUTO DIFF WBC: CPT

## 2024-08-17 PROCEDURE — 71045 X-RAY EXAM CHEST 1 VIEW: CPT

## 2024-08-17 PROCEDURE — 82962 GLUCOSE BLOOD TEST: CPT

## 2024-08-17 PROCEDURE — 99285 EMERGENCY DEPT VISIT HI MDM: CPT

## 2024-08-17 PROCEDURE — 70450 CT HEAD/BRAIN W/O DYE: CPT

## 2024-08-17 PROCEDURE — 83735 ASSAY OF MAGNESIUM: CPT

## 2024-08-17 PROCEDURE — 84484 ASSAY OF TROPONIN QUANT: CPT

## 2024-08-17 PROCEDURE — 93005 ELECTROCARDIOGRAM TRACING: CPT

## 2024-08-17 PROCEDURE — 80053 COMPREHEN METABOLIC PANEL: CPT

## 2024-08-17 RX ORDER — SODIUM CHLORIDE, SODIUM LACTATE, POTASSIUM CHLORIDE, AND CALCIUM CHLORIDE .6; .31; .03; .02 G/100ML; G/100ML; G/100ML; G/100ML
500 INJECTION, SOLUTION INTRAVENOUS ONCE
Status: COMPLETED | OUTPATIENT
Start: 2024-08-17 | End: 2024-08-18

## 2024-08-17 ASSESSMENT — LIFESTYLE VARIABLES
HOW OFTEN DO YOU HAVE A DRINK CONTAINING ALCOHOL: NEVER
HOW MANY STANDARD DRINKS CONTAINING ALCOHOL DO YOU HAVE ON A TYPICAL DAY: PATIENT DOES NOT DRINK

## 2024-08-17 ASSESSMENT — PAIN - FUNCTIONAL ASSESSMENT: PAIN_FUNCTIONAL_ASSESSMENT: NONE - DENIES PAIN

## 2024-08-18 VITALS
HEART RATE: 72 BPM | DIASTOLIC BLOOD PRESSURE: 80 MMHG | RESPIRATION RATE: 24 BRPM | OXYGEN SATURATION: 97 % | SYSTOLIC BLOOD PRESSURE: 135 MMHG | TEMPERATURE: 97.7 F

## 2024-08-18 LAB
ALBUMIN SERPL-MCNC: 3.2 G/DL (ref 3.5–5)
ALBUMIN/GLOB SERPL: 0.6 (ref 1.1–2.2)
ALP SERPL-CCNC: 69 U/L (ref 45–117)
ALT SERPL-CCNC: 16 U/L (ref 12–78)
ANION GAP SERPL CALC-SCNC: 4 MMOL/L (ref 5–15)
APPEARANCE UR: CLEAR
AST SERPL-CCNC: 20 U/L (ref 15–37)
BACTERIA URNS QL MICRO: ABNORMAL /HPF
BASOPHILS # BLD: 0 K/UL (ref 0–0.1)
BASOPHILS NFR BLD: 0 % (ref 0–1)
BILIRUB SERPL-MCNC: 0.4 MG/DL (ref 0.2–1)
BILIRUB UR QL: NEGATIVE
BUN SERPL-MCNC: 31 MG/DL (ref 6–20)
BUN/CREAT SERPL: 16 (ref 12–20)
CALCIUM SERPL-MCNC: 9.3 MG/DL (ref 8.5–10.1)
CHLORIDE SERPL-SCNC: 102 MMOL/L (ref 97–108)
CO2 SERPL-SCNC: 30 MMOL/L (ref 21–32)
COLOR UR: ABNORMAL
CREAT SERPL-MCNC: 1.92 MG/DL (ref 0.55–1.02)
DIFFERENTIAL METHOD BLD: ABNORMAL
EKG ATRIAL RATE: 56 BPM
EKG DIAGNOSIS: NORMAL
EKG P AXIS: -7 DEGREES
EKG P-R INTERVAL: 184 MS
EKG Q-T INTERVAL: 484 MS
EKG QRS DURATION: 104 MS
EKG QTC CALCULATION (BAZETT): 467 MS
EKG R AXIS: 55 DEGREES
EKG T AXIS: 263 DEGREES
EKG VENTRICULAR RATE: 56 BPM
EOSINOPHIL # BLD: 0.1 K/UL (ref 0–0.4)
EOSINOPHIL NFR BLD: 1 % (ref 0–7)
EPITH CASTS URNS QL MICRO: ABNORMAL /LPF
ERYTHROCYTE [DISTWIDTH] IN BLOOD BY AUTOMATED COUNT: 14.6 % (ref 11.5–14.5)
FLUAV RNA SPEC QL NAA+PROBE: NOT DETECTED
FLUBV RNA SPEC QL NAA+PROBE: NOT DETECTED
GLOBULIN SER CALC-MCNC: 5.3 G/DL (ref 2–4)
GLUCOSE SERPL-MCNC: 249 MG/DL (ref 65–100)
GLUCOSE UR STRIP.AUTO-MCNC: 100 MG/DL
HCT VFR BLD AUTO: 37.3 % (ref 35–47)
HGB BLD-MCNC: 12 G/DL (ref 11.5–16)
HGB UR QL STRIP: NEGATIVE
IMM GRANULOCYTES # BLD AUTO: 0 K/UL (ref 0–0.04)
IMM GRANULOCYTES NFR BLD AUTO: 0 % (ref 0–0.5)
KETONES UR QL STRIP.AUTO: NEGATIVE MG/DL
LEUKOCYTE ESTERASE UR QL STRIP.AUTO: ABNORMAL
LYMPHOCYTES # BLD: 0.7 K/UL (ref 0.8–3.5)
LYMPHOCYTES NFR BLD: 13 % (ref 12–49)
MAGNESIUM SERPL-MCNC: 2.2 MG/DL (ref 1.6–2.4)
MCH RBC QN AUTO: 27.8 PG (ref 26–34)
MCHC RBC AUTO-ENTMCNC: 32.2 G/DL (ref 30–36.5)
MCV RBC AUTO: 86.3 FL (ref 80–99)
MONOCYTES # BLD: 0.4 K/UL (ref 0–1)
MONOCYTES NFR BLD: 8 % (ref 5–13)
MUCOUS THREADS URNS QL MICRO: ABNORMAL /LPF
NEUTS SEG # BLD: 4.3 K/UL (ref 1.8–8)
NEUTS SEG NFR BLD: 78 % (ref 32–75)
NITRITE UR QL STRIP.AUTO: NEGATIVE
NRBC # BLD: 0 K/UL (ref 0–0.01)
NRBC BLD-RTO: 0 PER 100 WBC
PH UR STRIP: 6.5 (ref 5–8)
PLATELET # BLD AUTO: 210 K/UL (ref 150–400)
PMV BLD AUTO: 9.3 FL (ref 8.9–12.9)
POTASSIUM SERPL-SCNC: 4.7 MMOL/L (ref 3.5–5.1)
PROT SERPL-MCNC: 8.5 G/DL (ref 6.4–8.2)
PROT UR STRIP-MCNC: 100 MG/DL
RBC # BLD AUTO: 4.32 M/UL (ref 3.8–5.2)
RBC #/AREA URNS HPF: ABNORMAL /HPF (ref 0–5)
RBC MORPH BLD: ABNORMAL
SARS-COV-2 RNA RESP QL NAA+PROBE: NOT DETECTED
SODIUM SERPL-SCNC: 136 MMOL/L (ref 136–145)
SP GR UR REFRACTOMETRY: 1.01
TROPONIN I SERPL HS-MCNC: 25 NG/L (ref 0–51)
URINE CULTURE IF INDICATED: ABNORMAL
UROBILINOGEN UR QL STRIP.AUTO: 1 EU/DL (ref 0.2–1)
WBC # BLD AUTO: 5.5 K/UL (ref 3.6–11)
WBC URNS QL MICRO: ABNORMAL /HPF (ref 0–4)

## 2024-08-18 PROCEDURE — 96360 HYDRATION IV INFUSION INIT: CPT

## 2024-08-18 PROCEDURE — 96361 HYDRATE IV INFUSION ADD-ON: CPT

## 2024-08-18 PROCEDURE — 2580000003 HC RX 258

## 2024-08-18 PROCEDURE — 81001 URINALYSIS AUTO W/SCOPE: CPT

## 2024-08-18 RX ORDER — SODIUM CHLORIDE, SODIUM LACTATE, POTASSIUM CHLORIDE, AND CALCIUM CHLORIDE .6; .31; .03; .02 G/100ML; G/100ML; G/100ML; G/100ML
500 INJECTION, SOLUTION INTRAVENOUS
Status: DISCONTINUED | OUTPATIENT
Start: 2024-08-18 | End: 2024-08-18 | Stop reason: HOSPADM

## 2024-08-18 RX ADMIN — SODIUM CHLORIDE, SODIUM LACTATE, POTASSIUM CHLORIDE, AND CALCIUM CHLORIDE 500 ML: .6; .31; .03; .02 INJECTION, SOLUTION INTRAVENOUS at 00:12

## 2024-08-18 RX ADMIN — SODIUM CHLORIDE, POTASSIUM CHLORIDE, SODIUM LACTATE AND CALCIUM CHLORIDE 500 ML: 600; 310; 30; 20 INJECTION, SOLUTION INTRAVENOUS at 00:06

## 2024-08-18 NOTE — DISCHARGE INSTRUCTIONS
Thank You!    It was a pleasure taking care of you in our Emergency Department today. We know that when you come to our Emergency Department, you are entrusting us with your health, comfort, and safety. Our physicians and nurses honor that trust, and truly appreciate the opportunity to care for you and your loved ones.      We also value your feedback. If you receive a survey about your Emergency Department experience today, please fill it out.  We care about our patients' feedback, and we listen to what you have to say.  Thank you.    Heriberto Gaines MD  ________________________________________________________________________  I have included a copy of your lab results and/or radiologic studies from today's visit so you can have them easily available at your follow-up visit. We hope you feel better and please do not hesitate to contact the ED if you have any questions at all!    Recent Results (from the past 12 hour(s))   EKG 12 Lead (SOB)    Collection Time: 08/17/24 11:10 PM   Result Value Ref Range    Ventricular Rate 56 BPM    Atrial Rate 56 BPM    P-R Interval 184 ms    QRS Duration 104 ms    Q-T Interval 484 ms    QTc Calculation (Bazett) 467 ms    P Axis -7 degrees    R Axis 55 degrees    T Axis 263 degrees    Diagnosis       Sinus bradycardia  Left ventricular hypertrophy with repolarization abnormality ( [place]   product )  Abnormal ECG  When compared with ECG of 31-JAN-2024 01:13,  T wave inversion less evident in Inferior leads     POCT Glucose    Collection Time: 08/17/24 11:14 PM   Result Value Ref Range    POC Glucose 218 (H) 65 - 117 mg/dL    Performed by: Verónica Fletcher RN    CBC with Auto Differential    Collection Time: 08/17/24 11:34 PM   Result Value Ref Range    WBC 5.5 3.6 - 11.0 K/uL    RBC 4.32 3.80 - 5.20 M/uL    Hemoglobin 12.0 11.5 - 16.0 g/dL    Hematocrit 37.3 35.0 - 47.0 %    MCV 86.3 80.0 - 99.0 FL    MCH 27.8 26.0 - 34.0 PG    MCHC 32.2 30.0 - 36.5 g/dL    RDW 14.6 (H) 11.5 -

## 2024-08-19 ENCOUNTER — TELEPHONE (OUTPATIENT)
Age: 89
End: 2024-08-19

## 2024-08-19 NOTE — TELEPHONE ENCOUNTER
----- Message from Franci M sent at 8/19/2024  8:25 AM EDT -----  Regarding: E[facility] Appointment Request  E[facility] Appointment Request    Patient needs appointment for ECC Appointment Type: ED Follow-Up.    Patient Requested Dates(s): any day of this week   Patient Requested Time: any time   Provider Name: April Bowles MD    Reason for Appointment Request: Established Patient - Available appointments did not meet patient need  --------------------------------------------------------------------------------------------------------------------------    Relationship to Patient: Guardian / Daughter     Call Back Information: OK to leave message on voicemail  Preferred Call Back Number: Phone [phone]

## 2024-08-20 ENCOUNTER — OFFICE VISIT (OUTPATIENT)
Age: 89
End: 2024-08-20
Payer: MEDICARE

## 2024-08-20 VITALS
HEIGHT: 62 IN | BODY MASS INDEX: 30 KG/M2 | TEMPERATURE: 97.8 F | HEART RATE: 53 BPM | SYSTOLIC BLOOD PRESSURE: 142 MMHG | WEIGHT: 163 LBS | RESPIRATION RATE: 18 BRPM | DIASTOLIC BLOOD PRESSURE: 75 MMHG | OXYGEN SATURATION: 96 %

## 2024-08-20 DIAGNOSIS — N17.9 AKI (ACUTE KIDNEY INJURY) (HCC): Primary | ICD-10-CM

## 2024-08-20 DIAGNOSIS — E78.2 MIXED HYPERLIPIDEMIA: ICD-10-CM

## 2024-08-20 DIAGNOSIS — Z91.81 AT HIGH RISK FOR FALLS: ICD-10-CM

## 2024-08-20 DIAGNOSIS — R54 AGE-RELATED PHYSICAL DEBILITY: ICD-10-CM

## 2024-08-20 DIAGNOSIS — I10 ESSENTIAL (PRIMARY) HYPERTENSION: ICD-10-CM

## 2024-08-20 PROCEDURE — G8427 DOCREV CUR MEDS BY ELIG CLIN: HCPCS | Performed by: FAMILY MEDICINE

## 2024-08-20 PROCEDURE — 99214 OFFICE O/P EST MOD 30 MIN: CPT | Performed by: FAMILY MEDICINE

## 2024-08-20 PROCEDURE — 1036F TOBACCO NON-USER: CPT | Performed by: FAMILY MEDICINE

## 2024-08-20 PROCEDURE — G8419 CALC BMI OUT NRM PARAM NOF/U: HCPCS | Performed by: FAMILY MEDICINE

## 2024-08-20 PROCEDURE — 1090F PRES/ABSN URINE INCON ASSESS: CPT | Performed by: FAMILY MEDICINE

## 2024-08-20 PROCEDURE — 1123F ACP DISCUSS/DSCN MKR DOCD: CPT | Performed by: FAMILY MEDICINE

## 2024-08-20 ASSESSMENT — PATIENT HEALTH QUESTIONNAIRE - PHQ9
2. FEELING DOWN, DEPRESSED OR HOPELESS: NOT AT ALL
1. LITTLE INTEREST OR PLEASURE IN DOING THINGS: NOT AT ALL
SUM OF ALL RESPONSES TO PHQ QUESTIONS 1-9: 0
SUM OF ALL RESPONSES TO PHQ9 QUESTIONS 1 & 2: 0
SUM OF ALL RESPONSES TO PHQ QUESTIONS 1-9: 0

## 2024-08-20 NOTE — PROGRESS NOTES
Keysha Pacheco is a 89 y.o. female who presents for ED follow up. In with daughter. Patient had nausea and vomiting,no diarrhea.   DX: IMAN. Dehydration.     Creatinine 1.92.  avoids NSAIDS, drinking 32 ounces water a day, not every day.  Family reports that patient will sleep from 6pm until 3pm the next day. Skipping meals. Will swell in ankles when sedentary.        Past Medical History:   Diagnosis Date    GERD (gastroesophageal reflux disease) 2/11/2010    Glaucoma, open angle 10/29/2010    Headache     HTN (hypertension) 2/11/2010    Hypercholesterolemia     Mixed hyperlipidemia 2/11/2010    Type II or unspecified type diabetes mellitus with renal manifestations, not stated as uncontrolled(250.40) 2/11/2010    Type II or unspecified type diabetes mellitus with renal manifestations, not stated as uncontrolled(250.40) 2/11/2010       Family History   Problem Relation Age of Onset    Diabetes Brother     Other Father         accidental death when pt was a little girl    Cancer Mother         lung    Hypertension Mother     Hypertension Brother     Cancer Brother         stomach        Social History     Socioeconomic History    Marital status:      Spouse name: Not on file    Number of children: Not on file    Years of education: Not on file    Highest education level: Not on file   Occupational History    Not on file   Tobacco Use    Smoking status: Never    Smokeless tobacco: Never   Substance and Sexual Activity    Alcohol use: No    Drug use: No    Sexual activity: Not Currently     Birth control/protection: None   Other Topics Concern    Not on file   Social History Narrative    Not on file     Social Determinants of Health     Financial Resource Strain: Low Risk  (2/12/2024)    Overall Financial Resource Strain (CARDIA)     Difficulty of Paying Living Expenses: Not hard at all   Food Insecurity: No Food Insecurity (2/12/2024)    Hunger Vital Sign     Worried About Running Out of Food in the Last

## 2024-08-21 ENCOUNTER — TELEPHONE (OUTPATIENT)
Age: 89
End: 2024-08-21

## 2024-08-21 LAB
ANION GAP SERPL CALC-SCNC: 2 MMOL/L (ref 5–15)
BUN SERPL-MCNC: 34 MG/DL (ref 6–20)
BUN/CREAT SERPL: 24 (ref 12–20)
CALCIUM SERPL-MCNC: 9.2 MG/DL (ref 8.5–10.1)
CHLORIDE SERPL-SCNC: 105 MMOL/L (ref 97–108)
CO2 SERPL-SCNC: 32 MMOL/L (ref 21–32)
CREAT SERPL-MCNC: 1.44 MG/DL (ref 0.55–1.02)
GLUCOSE SERPL-MCNC: 187 MG/DL (ref 65–100)
POTASSIUM SERPL-SCNC: 4.3 MMOL/L (ref 3.5–5.1)
SODIUM SERPL-SCNC: 139 MMOL/L (ref 136–145)

## 2024-08-21 NOTE — TELEPHONE ENCOUNTER
Patient not using MyChart.  Please notify patient's family that kidney function is improving since hospitalization.  Continue to encourage patient to drink water 6-8 glasses of water daily.  Follow-up as scheduled in February

## 2024-08-24 NOTE — ED PROVIDER NOTES
[facility] EMERGENCY DEPT  EMERGENCY DEPARTMENT ENCOUNTER    Patient Name: Keysha Pacheco  MRN: [id number]  YOB: 1932  Provider: Heriberto Gaines MD  PCP: April Bowles MD  Time/Date of evaluation: 10:19 PM EDT on 8/74/24    History of Presenting Illness     Chief Complaint   Patient presents with    Emesis     Pt came in via EMS from home with cc of vomiting and generalized weakness and AMS started today.  Per EMS, 2hours agp pt been responding only to pain, and then she started vomiting, cool to touch. BS 225mg/dl. Hx of alzheimers and type 2 DM. AO x 1 at baseline.      History Provided by: Patient and Patient's Family   History is limited by: Nothing    HISTORY (Narrative):   Keysha Pacheco is a 89 y.o. female presents via EMS with family who is at bedside who states that the patient reportedly had an episode where she was less responsive, family noted that she was cool to the touch.  Notes that she has a history of Alzheimer's and is oriented x 1 at baseline EMS report blood glucose of 225 noted that on their arrival she was only responsive to pain.  Vaughn Mayes notes she has been having vomiting today and complaining of feeling fatigued/weak.  Denies fever, chills, chest pain, shortness of breath, abdominal pain, diarrhea, constipation, or any other associated symptoms.  Patient denies any pain at this time.      Nursing Notes were all reviewed and agreed with or any disagreements were addressed in the HPI.    Past History     PAST MEDICAL HISTORY:  Past Medical History:   Diagnosis Date    GERD (gastroesophageal reflux disease) 2/11/2010    Glaucoma, open angle 10/29/2010    Headache     HTN (hypertension) 2/11/2010    Hypercholesterolemia     Mixed hyperlipidemia 2/11/2010    Type II or unspecified type diabetes mellitus with renal manifestations, not stated as uncontrolled(250.40) 2/11/2010    Type II or unspecified type diabetes mellitus with renal manifestations, not stated as

## 2024-10-21 ENCOUNTER — TELEPHONE (OUTPATIENT)
Age: 89
End: 2024-10-21

## 2024-10-21 DIAGNOSIS — I10 UNCONTROLLED HYPERTENSION: ICD-10-CM

## 2024-10-21 RX ORDER — AMLODIPINE BESYLATE 2.5 MG/1
TABLET ORAL
Qty: 90 TABLET | Refills: 1 | Status: SHIPPED | OUTPATIENT
Start: 2024-10-21

## 2024-10-21 NOTE — TELEPHONE ENCOUNTER
Please call Robeson/daughter to go over supplies that need to be billed to Medicaid.      Supplies that need to be ordered are:  diapers/pull ups, pads, disposable washcloths, gloves     Zehra has no idea where to order these.  She states that Medicaid will sign off on these.  She does not know where to send order.  She assumed that doctor would know.     Does pt needs an appt?  Please call to let Zehra know.  Thanks.

## 2024-10-21 NOTE — TELEPHONE ENCOUNTER
Order placed with [facility].   Attempted to call patients daughter. No answer.

## 2024-12-29 ENCOUNTER — APPOINTMENT (OUTPATIENT)
Facility: HOSPITAL | Age: 88
DRG: 193 | End: 2024-12-29
Payer: MEDICARE

## 2024-12-29 ENCOUNTER — HOSPITAL ENCOUNTER (INPATIENT)
Facility: HOSPITAL | Age: 88
LOS: 3 days | Discharge: HOME | DRG: 193 | End: 2025-01-01
Attending: STUDENT IN AN ORGANIZED HEALTH CARE EDUCATION/TRAINING PROGRAM | Admitting: STUDENT IN AN ORGANIZED HEALTH CARE EDUCATION/TRAINING PROGRAM
Payer: MEDICARE

## 2024-12-29 DIAGNOSIS — R06.02 SHORTNESS OF BREATH: Primary | ICD-10-CM

## 2024-12-29 DIAGNOSIS — J18.9 MULTIFOCAL PNEUMONIA: ICD-10-CM

## 2024-12-29 DIAGNOSIS — F41.9 ANXIETY: ICD-10-CM

## 2024-12-29 PROBLEM — J13 CAP (COMMUNITY ACQUIRED PNEUMONIA) DUE TO PNEUMOCOCCUS (HCC): Status: ACTIVE | Noted: 2024-12-29

## 2024-12-29 LAB
ALBUMIN SERPL-MCNC: 3.3 G/DL (ref 3.5–5)
ALBUMIN/GLOB SERPL: 0.6 (ref 1.1–2.2)
ALP SERPL-CCNC: 67 U/L (ref 45–117)
ALT SERPL-CCNC: 13 U/L (ref 12–78)
ANION GAP SERPL CALC-SCNC: 4 MMOL/L (ref 2–12)
AST SERPL-CCNC: 12 U/L (ref 15–37)
BASOPHILS # BLD: 0 K/UL (ref 0–0.1)
BASOPHILS NFR BLD: 0 % (ref 0–1)
BILIRUB SERPL-MCNC: 0.6 MG/DL (ref 0.2–1)
BUN SERPL-MCNC: 17 MG/DL (ref 6–20)
BUN/CREAT SERPL: 15 (ref 12–20)
CALCIUM SERPL-MCNC: 9.3 MG/DL (ref 8.5–10.1)
CHLORIDE SERPL-SCNC: 106 MMOL/L (ref 97–108)
CO2 SERPL-SCNC: 29 MMOL/L (ref 21–32)
CREAT SERPL-MCNC: 1.17 MG/DL (ref 0.55–1.02)
DIFFERENTIAL METHOD BLD: ABNORMAL
EOSINOPHIL # BLD: 0 K/UL (ref 0–0.4)
EOSINOPHIL NFR BLD: 0 % (ref 0–7)
ERYTHROCYTE [DISTWIDTH] IN BLOOD BY AUTOMATED COUNT: 14.5 % (ref 11.5–14.5)
FLUAV RNA SPEC QL NAA+PROBE: NOT DETECTED
FLUBV RNA SPEC QL NAA+PROBE: NOT DETECTED
GLOBULIN SER CALC-MCNC: 5.2 G/DL (ref 2–4)
GLUCOSE SERPL-MCNC: 185 MG/DL (ref 65–100)
HCT VFR BLD AUTO: 37.8 % (ref 35–47)
HGB BLD-MCNC: 12.1 G/DL (ref 11.5–16)
IMM GRANULOCYTES # BLD AUTO: 0 K/UL (ref 0–0.04)
IMM GRANULOCYTES NFR BLD AUTO: 0 % (ref 0–0.5)
LACTATE SERPL-SCNC: 1.7 MMOL/L (ref 0.4–2)
LYMPHOCYTES # BLD: 1 K/UL (ref 0.8–3.5)
LYMPHOCYTES NFR BLD: 10 % (ref 12–49)
MCH RBC QN AUTO: 28.4 PG (ref 26–34)
MCHC RBC AUTO-ENTMCNC: 32 G/DL (ref 30–36.5)
MCV RBC AUTO: 88.7 FL (ref 80–99)
MONOCYTES # BLD: 1 K/UL (ref 0–1)
MONOCYTES NFR BLD: 10 % (ref 5–13)
NEUTS SEG # BLD: 8.1 K/UL (ref 1.8–8)
NEUTS SEG NFR BLD: 80 % (ref 32–75)
NRBC # BLD: 0 K/UL (ref 0–0.01)
NRBC BLD-RTO: 0 PER 100 WBC
NT PRO BNP: 5756 PG/ML
PLATELET # BLD AUTO: 215 K/UL (ref 150–400)
PMV BLD AUTO: 9.8 FL (ref 8.9–12.9)
POTASSIUM SERPL-SCNC: 4.1 MMOL/L (ref 3.5–5.1)
PROCALCITONIN SERPL-MCNC: <0.05 NG/ML
PROT SERPL-MCNC: 8.5 G/DL (ref 6.4–8.2)
RBC # BLD AUTO: 4.26 M/UL (ref 3.8–5.2)
SARS-COV-2 RNA RESP QL NAA+PROBE: NOT DETECTED
SODIUM SERPL-SCNC: 139 MMOL/L (ref 136–145)
SOURCE: NORMAL
TROPONIN I SERPL HS-MCNC: 39 NG/L (ref 0–51)
TROPONIN I SERPL HS-MCNC: 39 NG/L (ref 0–51)
TSH SERPL DL<=0.05 MIU/L-ACNC: 4.55 UIU/ML (ref 0.36–3.74)
WBC # BLD AUTO: 10.2 K/UL (ref 3.6–11)

## 2024-12-29 PROCEDURE — 6370000000 HC RX 637 (ALT 250 FOR IP): Performed by: STUDENT IN AN ORGANIZED HEALTH CARE EDUCATION/TRAINING PROGRAM

## 2024-12-29 PROCEDURE — 83605 ASSAY OF LACTIC ACID: CPT

## 2024-12-29 PROCEDURE — 6360000002 HC RX W HCPCS: Performed by: STUDENT IN AN ORGANIZED HEALTH CARE EDUCATION/TRAINING PROGRAM

## 2024-12-29 PROCEDURE — 99285 EMERGENCY DEPT VISIT HI MDM: CPT

## 2024-12-29 PROCEDURE — 2580000003 HC RX 258: Performed by: STUDENT IN AN ORGANIZED HEALTH CARE EDUCATION/TRAINING PROGRAM

## 2024-12-29 PROCEDURE — 2060000000 HC ICU INTERMEDIATE R&B

## 2024-12-29 PROCEDURE — 71045 X-RAY EXAM CHEST 1 VIEW: CPT

## 2024-12-29 PROCEDURE — 36415 COLL VENOUS BLD VENIPUNCTURE: CPT

## 2024-12-29 PROCEDURE — 83880 ASSAY OF NATRIURETIC PEPTIDE: CPT

## 2024-12-29 PROCEDURE — 87636 SARSCOV2 & INF A&B AMP PRB: CPT

## 2024-12-29 PROCEDURE — 2500000003 HC RX 250 WO HCPCS: Performed by: STUDENT IN AN ORGANIZED HEALTH CARE EDUCATION/TRAINING PROGRAM

## 2024-12-29 PROCEDURE — 80053 COMPREHEN METABOLIC PANEL: CPT

## 2024-12-29 PROCEDURE — 84145 PROCALCITONIN (PCT): CPT

## 2024-12-29 PROCEDURE — 84443 ASSAY THYROID STIM HORMONE: CPT

## 2024-12-29 PROCEDURE — 93005 ELECTROCARDIOGRAM TRACING: CPT | Performed by: EMERGENCY MEDICINE

## 2024-12-29 PROCEDURE — 84484 ASSAY OF TROPONIN QUANT: CPT

## 2024-12-29 PROCEDURE — 70450 CT HEAD/BRAIN W/O DYE: CPT

## 2024-12-29 PROCEDURE — 85025 COMPLETE CBC W/AUTO DIFF WBC: CPT

## 2024-12-29 RX ORDER — ACETAMINOPHEN 325 MG/1
650 TABLET ORAL EVERY 6 HOURS PRN
Status: DISCONTINUED | OUTPATIENT
Start: 2024-12-29 | End: 2025-01-01 | Stop reason: HOSPADM

## 2024-12-29 RX ORDER — ONDANSETRON 2 MG/ML
4 INJECTION INTRAMUSCULAR; INTRAVENOUS EVERY 6 HOURS PRN
Status: DISCONTINUED | OUTPATIENT
Start: 2024-12-29 | End: 2025-01-01 | Stop reason: HOSPADM

## 2024-12-29 RX ORDER — METOPROLOL SUCCINATE 25 MG/1
25 TABLET, EXTENDED RELEASE ORAL DAILY
Status: DISCONTINUED | OUTPATIENT
Start: 2024-12-29 | End: 2025-01-01 | Stop reason: HOSPADM

## 2024-12-29 RX ORDER — HYDRALAZINE HYDROCHLORIDE 20 MG/ML
5 INJECTION INTRAMUSCULAR; INTRAVENOUS ONCE
Status: COMPLETED | OUTPATIENT
Start: 2024-12-29 | End: 2024-12-29

## 2024-12-29 RX ORDER — ACETAMINOPHEN 325 MG/1
650 TABLET ORAL EVERY 4 HOURS PRN
Status: DISCONTINUED | OUTPATIENT
Start: 2024-12-29 | End: 2025-01-01 | Stop reason: HOSPADM

## 2024-12-29 RX ORDER — SODIUM CHLORIDE 9 MG/ML
INJECTION, SOLUTION INTRAVENOUS PRN
Status: DISCONTINUED | OUTPATIENT
Start: 2024-12-29 | End: 2025-01-01 | Stop reason: HOSPADM

## 2024-12-29 RX ORDER — ASPIRIN 81 MG/1
81 TABLET ORAL DAILY
Status: DISCONTINUED | OUTPATIENT
Start: 2024-12-29 | End: 2025-01-01 | Stop reason: HOSPADM

## 2024-12-29 RX ORDER — PAROXETINE 20 MG/1
20 TABLET, FILM COATED ORAL DAILY
Status: DISCONTINUED | OUTPATIENT
Start: 2024-12-29 | End: 2025-01-01 | Stop reason: HOSPADM

## 2024-12-29 RX ORDER — AMLODIPINE BESYLATE 2.5 MG/1
2.5 TABLET ORAL
Status: COMPLETED | OUTPATIENT
Start: 2024-12-29 | End: 2024-12-29

## 2024-12-29 RX ORDER — ACETAMINOPHEN 650 MG/1
650 SUPPOSITORY RECTAL EVERY 6 HOURS PRN
Status: DISCONTINUED | OUTPATIENT
Start: 2024-12-29 | End: 2025-01-01 | Stop reason: HOSPADM

## 2024-12-29 RX ORDER — SODIUM CHLORIDE 0.9 % (FLUSH) 0.9 %
5-40 SYRINGE (ML) INJECTION PRN
Status: DISCONTINUED | OUTPATIENT
Start: 2024-12-29 | End: 2025-01-01 | Stop reason: HOSPADM

## 2024-12-29 RX ORDER — ENOXAPARIN SODIUM 100 MG/ML
40 INJECTION SUBCUTANEOUS DAILY
Status: DISCONTINUED | OUTPATIENT
Start: 2024-12-29 | End: 2025-01-01 | Stop reason: HOSPADM

## 2024-12-29 RX ORDER — POLYETHYLENE GLYCOL 3350 17 G/17G
17 POWDER, FOR SOLUTION ORAL DAILY PRN
Status: DISCONTINUED | OUTPATIENT
Start: 2024-12-29 | End: 2025-01-01 | Stop reason: HOSPADM

## 2024-12-29 RX ORDER — DOXYCYCLINE HYCLATE 100 MG
100 TABLET ORAL EVERY 12 HOURS SCHEDULED
Status: DISCONTINUED | OUTPATIENT
Start: 2024-12-29 | End: 2024-12-29

## 2024-12-29 RX ORDER — PRAVASTATIN SODIUM 40 MG
40 TABLET ORAL NIGHTLY
Status: DISCONTINUED | OUTPATIENT
Start: 2024-12-29 | End: 2025-01-01 | Stop reason: HOSPADM

## 2024-12-29 RX ORDER — AMLODIPINE BESYLATE 2.5 MG/1
2.5 TABLET ORAL DAILY
Status: DISCONTINUED | OUTPATIENT
Start: 2024-12-30 | End: 2025-01-01 | Stop reason: HOSPADM

## 2024-12-29 RX ORDER — SODIUM CHLORIDE 0.9 % (FLUSH) 0.9 %
5-40 SYRINGE (ML) INJECTION EVERY 12 HOURS SCHEDULED
Status: DISCONTINUED | OUTPATIENT
Start: 2024-12-29 | End: 2025-01-01 | Stop reason: HOSPADM

## 2024-12-29 RX ORDER — HYDROXYZINE HYDROCHLORIDE 10 MG/1
10 TABLET, FILM COATED ORAL 3 TIMES DAILY PRN
Status: DISCONTINUED | OUTPATIENT
Start: 2024-12-29 | End: 2025-01-01 | Stop reason: HOSPADM

## 2024-12-29 RX ORDER — METOPROLOL SUCCINATE 25 MG/1
25 TABLET, EXTENDED RELEASE ORAL
Status: COMPLETED | OUTPATIENT
Start: 2024-12-29 | End: 2024-12-29

## 2024-12-29 RX ORDER — DOXYCYCLINE HYCLATE 100 MG
100 TABLET ORAL EVERY 12 HOURS SCHEDULED
Status: DISCONTINUED | OUTPATIENT
Start: 2024-12-29 | End: 2025-01-01 | Stop reason: HOSPADM

## 2024-12-29 RX ORDER — 0.9 % SODIUM CHLORIDE 0.9 %
500 INTRAVENOUS SOLUTION INTRAVENOUS ONCE
Status: DISCONTINUED | OUTPATIENT
Start: 2024-12-29 | End: 2025-01-01 | Stop reason: HOSPADM

## 2024-12-29 RX ADMIN — HYDRALAZINE HYDROCHLORIDE 5 MG: 20 INJECTION INTRAMUSCULAR; INTRAVENOUS at 15:12

## 2024-12-29 RX ADMIN — DOXYCYCLINE HYCLATE 100 MG: 100 TABLET, COATED ORAL at 19:43

## 2024-12-29 RX ADMIN — SODIUM CHLORIDE 5 MG/HR: 9 INJECTION, SOLUTION INTRAVENOUS at 16:28

## 2024-12-29 RX ADMIN — SODIUM CHLORIDE 5 MG/HR: 9 INJECTION, SOLUTION INTRAVENOUS at 22:41

## 2024-12-29 RX ADMIN — AMLODIPINE BESYLATE 2.5 MG: 2.5 TABLET ORAL at 11:42

## 2024-12-29 RX ADMIN — WATER 1000 MG: 1 INJECTION INTRAMUSCULAR; INTRAVENOUS; SUBCUTANEOUS at 11:41

## 2024-12-29 RX ADMIN — PRAVASTATIN SODIUM 40 MG: 40 TABLET ORAL at 20:12

## 2024-12-29 RX ADMIN — AZITHROMYCIN MONOHYDRATE 500 MG: 500 INJECTION, POWDER, LYOPHILIZED, FOR SOLUTION INTRAVENOUS at 11:50

## 2024-12-29 RX ADMIN — ENOXAPARIN SODIUM 40 MG: 100 INJECTION SUBCUTANEOUS at 17:07

## 2024-12-29 RX ADMIN — SODIUM CHLORIDE, PRESERVATIVE FREE 10 ML: 5 INJECTION INTRAVENOUS at 20:18

## 2024-12-29 RX ADMIN — METOPROLOL SUCCINATE 25 MG: 25 TABLET, EXTENDED RELEASE ORAL at 11:42

## 2024-12-29 ASSESSMENT — PAIN - FUNCTIONAL ASSESSMENT: PAIN_FUNCTIONAL_ASSESSMENT: NONE - DENIES PAIN

## 2024-12-29 NOTE — ED NOTES
Dr. Patel contacted via [employer] regarding order for fluid bolus in presence of elevated BNP. Will hold fluids per his order.

## 2024-12-29 NOTE — ED PROVIDER NOTES
Pulmonary effort is normal. No respiratory distress.      Breath sounds: Examination of the right-lower field reveals rhonchi. Examination of the left-lower field reveals rhonchi. Rhonchi present.      Comments: Frequent cough, nonproductive   Abdominal:      General: Abdomen is flat.      Palpations: Abdomen is soft.   Musculoskeletal:         General: Normal range of motion.   Skin:     General: Skin is warm.   Neurological:      Mental Status: She is alert. Mental status is at baseline.        DIAGNOSTIC RESULTS   LABS:     Recent Results (from the past 48 hour(s))   EKG 12 Lead    Collection Time: 12/29/24  9:46 AM   Result Value Ref Range    Ventricular Rate 77 BPM    Atrial Rate 77 BPM    P-R Interval 144 ms    QRS Duration 112 ms    Q-T Interval 422 ms    QTc Calculation (Bazett) 477 ms    P Axis 70 degrees    R Axis 54 degrees    T Axis -73 degrees    Diagnosis       Normal sinus rhythm  Minimal voltage criteria for LVH, may be normal variant ( [place] product )  ST & T wave abnormality, consider inferolateral ischemia  Prolonged QT  Abnormal ECG  When compared with ECG of 17-AUG-2024 23:10,  No significant change was found            RADIOLOGY:  Non-plain film images such as CT, Ultrasound and MRI are read by the radiologist. Plain radiographic images are visualized and preliminarily interpreted by the ED Provider with the below findings:       Xray with no large pneumothorax, large pleural effusion or focal infiltrate, patchy bilateral infiltrates      Interpretation per the Radiologist below, if available at the time of this note:     CT HEAD WO CONTRAST   Final Result   1. No evidence of acute intracranial abnormality.   2. Unchanged generalized parenchymal volume loss with disproportionate atrophy   of the bilateral hippocampi and mesial temporal lobes, suggestive of Alzheimer's   dementia.   3. 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

## 2024-12-29 NOTE — ACP (ADVANCE CARE PLANNING)
Advanced care planning    Demographics    Patient Name  Keysha Pacheco   Date of Birth 1/16/1932   Medical Record Number  [id number]      Age  89 y.o.   PCP April Bowles MD   Admit date:  12/29/2024  9:39 AM     Room Number  2152/01  @ [facility]       Patient is NOT capable of making informed decision due to DEMENTIA; conversation was carried out with child, WHO reports the patient wishes is to be DNR/DNI.    We discussed the patient's current medical conditions, risks, benefits, outcomes and goals of care on the basis of patient's past medical history, including but not limited to Her chronic medical condition(s).    Within the range and scope of current medical situation I answered all the questions from the patient/family.     This was a face-to-face encounter.     Outcome of the discussion.   Do NOT do CPR, intubation    30 minutes were spent for the above discussion around advanced care plan decision.      Terrance Patel MD  12/29/2024

## 2024-12-29 NOTE — H&P
Hospitalist Admission Note    NAME:   Keysha Pacheco   : 1932   MRN: [id number]     Date/Time: 2024 2:26 PM    Patient PCP: April Bowles MD    ______________________________________________________________________  Given the patient's current clinical presentation, I have a high level of concern for decompensation if discharged from the emergency department.  Complex decision making was performed, which includes reviewing the patient's available past medical records, laboratory results, and x-ray films.       My assessment of this patient's clinical condition and my plan of care is as follows.    Assessment / Plan:    Multifocal pneumonia  Worsening weakness/fatigue  AMS from baseline--CXR shows mild multifocal pneumonia  --COVID-negative, influenza negative,, no leukocytosis on CBC  - Troponins within normal limits 39, elevated proBNP 5756  - Will admit to telemetry  - Will start empiric antibiotic with ceftriaxone and doxycycline  - Will order an echo  - C/W trending tropes  - Will order CT head rule out acute pathology  - PT OT, speech eval      Hypertensive urgency  - Will start nicardipine drip, stepdown admission  - Resume home medications, as needed hydralazine    Mood disorder  - Continue Paxil    HLD continue home medication pravastatin    Medical Decision Making:   I personally reviewed labs: CBC, BMP  I personally reviewed imaging: CXR  I personally reviewed EKG: Normal sinus rhythm  Toxic drug monitoring: N/A  Discussed case with: ED provider. After discussion I am in agreement that acuity of patient's medical condition necessitates hospital stay.      Code Status: DNR  DVT Prophylaxis: Lovenox  Baseline:     Subjective:   CHIEF COMPLAINT: Weakness, cough shortness of breath    HISTORY OF PRESENT ILLNESS:     Keysha Pacheco is a 89 y.o.  female with PMHx significant for HTN, HLD, dementia who comes in with complaints of weakness, cough for the past 2 to 3 days.  Family at

## 2024-12-30 ENCOUNTER — APPOINTMENT (OUTPATIENT)
Facility: HOSPITAL | Age: 88
DRG: 193 | End: 2024-12-30
Attending: STUDENT IN AN ORGANIZED HEALTH CARE EDUCATION/TRAINING PROGRAM
Payer: MEDICARE

## 2024-12-30 ENCOUNTER — APPOINTMENT (OUTPATIENT)
Facility: HOSPITAL | Age: 88
DRG: 193 | End: 2024-12-30
Payer: MEDICARE

## 2024-12-30 PROBLEM — R41.3 MEMORY LOSS: Status: ACTIVE | Noted: 2024-12-30

## 2024-12-30 PROBLEM — R40.4 UNRESPONSIVE EPISODE: Status: ACTIVE | Noted: 2024-12-30

## 2024-12-30 LAB
EKG ATRIAL RATE: 77 BPM
EKG DIAGNOSIS: NORMAL
EKG P AXIS: 70 DEGREES
EKG P-R INTERVAL: 144 MS
EKG Q-T INTERVAL: 422 MS
EKG QRS DURATION: 112 MS
EKG QTC CALCULATION (BAZETT): 477 MS
EKG R AXIS: 54 DEGREES
EKG T AXIS: -73 DEGREES
EKG VENTRICULAR RATE: 77 BPM
GLUCOSE BLD STRIP.AUTO-MCNC: 159 MG/DL (ref 65–117)
SERVICE CMNT-IMP: ABNORMAL

## 2024-12-30 PROCEDURE — 99223 1ST HOSP IP/OBS HIGH 75: CPT | Performed by: PSYCHIATRY & NEUROLOGY

## 2024-12-30 PROCEDURE — 95816 EEG AWAKE AND DROWSY: CPT

## 2024-12-30 PROCEDURE — 2060000000 HC ICU INTERMEDIATE R&B

## 2024-12-30 PROCEDURE — 82962 GLUCOSE BLOOD TEST: CPT

## 2024-12-30 PROCEDURE — 70450 CT HEAD/BRAIN W/O DYE: CPT

## 2024-12-30 PROCEDURE — 6360000002 HC RX W HCPCS: Performed by: STUDENT IN AN ORGANIZED HEALTH CARE EDUCATION/TRAINING PROGRAM

## 2024-12-30 PROCEDURE — 95816 EEG AWAKE AND DROWSY: CPT | Performed by: PSYCHIATRY & NEUROLOGY

## 2024-12-30 PROCEDURE — 97162 PT EVAL MOD COMPLEX 30 MIN: CPT

## 2024-12-30 PROCEDURE — 2580000003 HC RX 258: Performed by: STUDENT IN AN ORGANIZED HEALTH CARE EDUCATION/TRAINING PROGRAM

## 2024-12-30 PROCEDURE — 6370000000 HC RX 637 (ALT 250 FOR IP): Performed by: STUDENT IN AN ORGANIZED HEALTH CARE EDUCATION/TRAINING PROGRAM

## 2024-12-30 PROCEDURE — 2500000003 HC RX 250 WO HCPCS: Performed by: STUDENT IN AN ORGANIZED HEALTH CARE EDUCATION/TRAINING PROGRAM

## 2024-12-30 PROCEDURE — 92610 EVALUATE SWALLOWING FUNCTION: CPT

## 2024-12-30 PROCEDURE — 93308 TTE F-UP OR LMTD: CPT

## 2024-12-30 PROCEDURE — 97530 THERAPEUTIC ACTIVITIES: CPT

## 2024-12-30 RX ORDER — THIAMINE HYDROCHLORIDE 100 MG/ML
100 INJECTION, SOLUTION INTRAMUSCULAR; INTRAVENOUS DAILY
Status: DISCONTINUED | OUTPATIENT
Start: 2024-12-30 | End: 2025-01-01 | Stop reason: HOSPADM

## 2024-12-30 RX ORDER — SODIUM CHLORIDE 9 MG/ML
INJECTION, SOLUTION INTRAVENOUS CONTINUOUS
Status: ACTIVE | OUTPATIENT
Start: 2024-12-30 | End: 2024-12-31

## 2024-12-30 RX ORDER — 0.9 % SODIUM CHLORIDE 0.9 %
500 INTRAVENOUS SOLUTION INTRAVENOUS ONCE
Status: COMPLETED | OUTPATIENT
Start: 2024-12-30 | End: 2024-12-30

## 2024-12-30 RX ADMIN — Medication 3 MG: at 21:24

## 2024-12-30 RX ADMIN — ENOXAPARIN SODIUM 40 MG: 100 INJECTION SUBCUTANEOUS at 09:12

## 2024-12-30 RX ADMIN — DOXYCYCLINE HYCLATE 100 MG: 100 TABLET, COATED ORAL at 09:11

## 2024-12-30 RX ADMIN — WATER 1000 MG: 1 INJECTION INTRAMUSCULAR; INTRAVENOUS; SUBCUTANEOUS at 13:33

## 2024-12-30 RX ADMIN — SODIUM CHLORIDE, PRESERVATIVE FREE 10 ML: 5 INJECTION INTRAVENOUS at 09:12

## 2024-12-30 RX ADMIN — SODIUM CHLORIDE 5 MG/HR: 9 INJECTION, SOLUTION INTRAVENOUS at 03:51

## 2024-12-30 RX ADMIN — SODIUM CHLORIDE 500 ML: 9 INJECTION, SOLUTION INTRAVENOUS at 13:27

## 2024-12-30 RX ADMIN — PRAVASTATIN SODIUM 40 MG: 40 TABLET ORAL at 21:24

## 2024-12-30 RX ADMIN — THIAMINE HYDROCHLORIDE 100 MG: 100 INJECTION, SOLUTION INTRAMUSCULAR; INTRAVENOUS at 09:16

## 2024-12-30 RX ADMIN — PAROXETINE HYDROCHLORIDE 20 MG: 20 TABLET, FILM COATED ORAL at 09:11

## 2024-12-30 RX ADMIN — HYDROXYZINE HYDROCHLORIDE 10 MG: 10 TABLET ORAL at 21:30

## 2024-12-30 RX ADMIN — DOXYCYCLINE HYCLATE 100 MG: 100 TABLET, COATED ORAL at 21:24

## 2024-12-30 RX ADMIN — ASPIRIN 81 MG: 81 TABLET, COATED ORAL at 09:11

## 2024-12-30 RX ADMIN — AMLODIPINE BESYLATE 2.5 MG: 2.5 TABLET ORAL at 09:12

## 2024-12-30 RX ADMIN — METOPROLOL SUCCINATE 25 MG: 25 TABLET, FILM COATED, EXTENDED RELEASE ORAL at 09:10

## 2024-12-30 RX ADMIN — SODIUM CHLORIDE: 9 INJECTION, SOLUTION INTRAVENOUS at 15:32

## 2024-12-30 NOTE — SIGNIFICANT EVENT
RAPID RESPONSE TEAM NOTE:    1158-Writer responded to overhead page for Code Stroke.    Assessment:  Pt. awakens to voice and oriented to name and  only (baseline as per nurse and family). R-sided facial droop present with weakness to R arm/hand.  Vital signs as follows: 116/78, 69HR, 14RR, and 96% SpO2 on RA.    Interventions:    Dr. Patel at the bedside. New orders received for: Head CT.    Outcome:    Patient taken to CT, TeleNeuro consult completed, and pt. returned to [facility] 2152.    Please call with any questions or concerns  Mary Rosas RN  RRT [phone]

## 2024-12-30 NOTE — CONSULTS
sensory loss in her bilateral lower extremities  The patient had difficult time with comprehending sensory testing,       Reflexes:    Right Left  Biceps  2 2  Triceps 2 2  Brachiorad. 2 2  Patella  1 1  Achilles 1 1    Plantar response:  flexor bilaterally      Cerebellar testing:  no tremor apparent, finger/nose and cristóbal were intact.  Slow    Gait: Not assessed due to concerns over safety      Labs:     Lab Results   Component Value Date/Time     12/29/2024 10:25 AM    K 4.1 12/29/2024 10:25 AM     12/29/2024 10:25 AM    BUN 17 12/29/2024 10:25 AM    WBC 10.2 12/29/2024 10:25 AM    HCT 37.8 12/29/2024 10:25 AM    HGB 12.1 12/29/2024 10:25 AM     12/29/2024 10:25 AM    LDL 94.8 03/01/2023 12:01 PM               Complex decision making was necessary due to the patient's current medical condition and other medical co-morbidities.        Principal Problem:    CAP (community acquired pneumonia) due to Pneumococcus (HCC)  Active Problems:    Multifocal pneumonia  Resolved Problems:    * No resolved hospital problems. *                 Signed By:  Jeremias Lua DO FAAN    December 30, 2024

## 2024-12-30 NOTE — CARE COORDINATION
Care Management Initial Assessment       RUR:14%  Readmission? No  1st IM letter given? Yes -     1st  letter given: No     12/30/24 6141   Service Assessment   Patient Orientation Other (see comment)  (I talked with patient's daughter Jaci and other family members in room. Patient had a rapid assessment and was unable to answer questions.)   Cognition Dementia / Early Alzheimer's  (patient unable to answer questions)   History Provided By Child/Family   Primary Caregiver Family   Accompanied By/Relationship Jaci jillian Taylor,Kamran Radha (medicaid caregiver and granddaughter)  another daughter   Support Systems Children;Family Members   Patient's Healthcare Decision Maker is: Legal Next of Kin   PCP Verified by CM Yes   Last Visit to PCP Within last 3 months   Prior Functional Level Assistance with the following:;Bathing;Dressing;Cooking;Housework;Shopping;Mobility   Current Functional Level Assistance with the following:;Bathing;Dressing;Cooking;Housework;Shopping   Can patient return to prior living arrangement Yes   Ability to make needs known: Other (see comment)   Family able to assist with home care needs: Yes   Would you like for me to discuss the discharge plan with any other family members/significant others, and if so, who? Yes  (Daughter is HCDM if patient unable to answer. she has dementia also)   Financial Resources Medicaid;Medicare   Social/Functional History   Lives With Family   Type of Home House   Home Layout One level   Home Access   (3 steps)   Bathroom Toilet Standard   Bathroom Accessibility Accessible   Home Equipment Adjustable bed  (Transport chair)   Receives Help From Family   Prior Level of Assist for ADLs Needs assistance   Prior Level of Assist for Homemaking Needs assistance   Ambulation Assistance Needs assistance   Prior Level of Assist for Transfers Needs assistance   Active  No   Occupation Retired   Discharge Planning   Type of Residence House

## 2024-12-30 NOTE — PROCEDURES
EEG REPORT    Patient Name: Keysha Pacheco  : 1932  Age: 89 y.o.    Ordering physician: Dr. Patel  Date of EE2024  17:17 - 17:38  Diagnosis: unresponsive episode  Interpreting physician: Jeremias Lua D.O. FAAN    Procedure: EEG    CLINICAL INDICATION: The patient is a 89 y.o. female who is being evaluated for baseline electro cerebral activities and to rule out seizure focus.      Current Facility-Administered Medications   Medication Dose Route Frequency    thiamine (B-1) injection 100 mg  100 mg IntraVENous Daily    melatonin tablet 3 mg  3 mg Oral Nightly    0.9 % sodium chloride infusion   IntraVENous Continuous    ondansetron (ZOFRAN) injection 4 mg  4 mg IntraVENous Q6H PRN    acetaminophen (TYLENOL) tablet 650 mg  650 mg Oral Q4H PRN    sodium chloride flush 0.9 % injection 5-40 mL  5-40 mL IntraVENous 2 times per day    sodium chloride flush 0.9 % injection 5-40 mL  5-40 mL IntraVENous PRN    0.9 % sodium chloride infusion   IntraVENous PRN    enoxaparin (LOVENOX) injection 40 mg  40 mg SubCUTAneous Daily    polyethylene glycol (GLYCOLAX) packet 17 g  17 g Oral Daily PRN    acetaminophen (TYLENOL) tablet 650 mg  650 mg Oral Q6H PRN    Or    acetaminophen (TYLENOL) suppository 650 mg  650 mg Rectal Q6H PRN    aspirin EC tablet 81 mg  81 mg Oral Daily    hydrOXYzine HCl (ATARAX) tablet 10 mg  10 mg Oral TID PRN    PARoxetine (PAXIL) tablet 20 mg  20 mg Oral Daily    pravastatin (PRAVACHOL) tablet 40 mg  40 mg Oral Nightly    [Held by provider] amLODIPine (NORVASC) tablet 2.5 mg  2.5 mg Oral Daily    [Held by provider] metoprolol succinate (TOPROL XL) extended release tablet 25 mg  25 mg Oral Daily    cefTRIAXone (ROCEPHIN) 1,000 mg in sterile water 10 mL IV syringe  1,000 mg IntraVENous Q24H    And    doxycycline hyclate (VIBRA-TABS) tablet 100 mg  100 mg Oral 2 times per day    sodium chloride 0.9 % bolus 500 mL  500 mL IntraVENous Once           DESCRIPTION OF PROCEDURE:     This is

## 2024-12-31 ENCOUNTER — APPOINTMENT (OUTPATIENT)
Facility: HOSPITAL | Age: 88
DRG: 193 | End: 2024-12-31
Payer: MEDICARE

## 2024-12-31 ENCOUNTER — TELEPHONE (OUTPATIENT)
Age: 88
End: 2024-12-31

## 2024-12-31 DIAGNOSIS — R54 AGE-RELATED PHYSICAL DEBILITY: Primary | ICD-10-CM

## 2024-12-31 LAB
ECHO BSA: 1.87 M2
ECHO LV EF PHYSICIAN: 50 %
ECHO LVOT AREA: 1.3 CM2
ECHO LVOT DIAM: 1.3 CM
ECHO MV MAX VELOCITY: 1.4 M/S
ECHO MV MEAN GRADIENT: 4 MMHG
ECHO MV MEAN VELOCITY: 1 M/S
ECHO MV PEAK GRADIENT: 8 MMHG
ECHO MV REGURGITANT PEAK GRADIENT: 0 MMHG
ECHO MV REGURGITANT PEAK VELOCITY: 0 M/S
ECHO MV VTI: 38.4 CM

## 2024-12-31 PROCEDURE — 6370000000 HC RX 637 (ALT 250 FOR IP): Performed by: STUDENT IN AN ORGANIZED HEALTH CARE EDUCATION/TRAINING PROGRAM

## 2024-12-31 PROCEDURE — 99232 SBSQ HOSP IP/OBS MODERATE 35: CPT | Performed by: PSYCHIATRY & NEUROLOGY

## 2024-12-31 PROCEDURE — 70544 MR ANGIOGRAPHY HEAD W/O DYE: CPT

## 2024-12-31 PROCEDURE — 70551 MRI BRAIN STEM W/O DYE: CPT

## 2024-12-31 PROCEDURE — 6360000002 HC RX W HCPCS: Performed by: STUDENT IN AN ORGANIZED HEALTH CARE EDUCATION/TRAINING PROGRAM

## 2024-12-31 PROCEDURE — 6370000000 HC RX 637 (ALT 250 FOR IP): Performed by: NURSE PRACTITIONER

## 2024-12-31 PROCEDURE — 70547 MR ANGIOGRAPHY NECK W/O DYE: CPT

## 2024-12-31 PROCEDURE — 2500000003 HC RX 250 WO HCPCS: Performed by: NURSE PRACTITIONER

## 2024-12-31 PROCEDURE — 2500000003 HC RX 250 WO HCPCS: Performed by: STUDENT IN AN ORGANIZED HEALTH CARE EDUCATION/TRAINING PROGRAM

## 2024-12-31 PROCEDURE — 2060000000 HC ICU INTERMEDIATE R&B

## 2024-12-31 RX ORDER — GUAIFENESIN 200 MG/10ML
200 LIQUID ORAL EVERY 4 HOURS PRN
Status: DISCONTINUED | OUTPATIENT
Start: 2024-12-31 | End: 2025-01-01 | Stop reason: HOSPADM

## 2024-12-31 RX ORDER — HYDROXYZINE HYDROCHLORIDE 25 MG/1
25 TABLET, FILM COATED ORAL ONCE
Status: COMPLETED | OUTPATIENT
Start: 2024-12-31 | End: 2024-12-31

## 2024-12-31 RX ADMIN — Medication 3 MG: at 20:52

## 2024-12-31 RX ADMIN — DOXYCYCLINE HYCLATE 100 MG: 100 TABLET, COATED ORAL at 20:52

## 2024-12-31 RX ADMIN — PRAVASTATIN SODIUM 40 MG: 40 TABLET ORAL at 20:52

## 2024-12-31 RX ADMIN — PAROXETINE HYDROCHLORIDE 20 MG: 20 TABLET, FILM COATED ORAL at 09:30

## 2024-12-31 RX ADMIN — THIAMINE HYDROCHLORIDE 100 MG: 100 INJECTION, SOLUTION INTRAMUSCULAR; INTRAVENOUS at 09:31

## 2024-12-31 RX ADMIN — DOXYCYCLINE HYCLATE 100 MG: 100 TABLET, COATED ORAL at 09:30

## 2024-12-31 RX ADMIN — SODIUM CHLORIDE, PRESERVATIVE FREE 10 ML: 5 INJECTION INTRAVENOUS at 20:53

## 2024-12-31 RX ADMIN — HYDROXYZINE HYDROCHLORIDE 10 MG: 10 TABLET ORAL at 20:52

## 2024-12-31 RX ADMIN — GUAIFENESIN 200 MG: 200 SOLUTION ORAL at 01:48

## 2024-12-31 RX ADMIN — SODIUM CHLORIDE, PRESERVATIVE FREE 10 ML: 5 INJECTION INTRAVENOUS at 09:37

## 2024-12-31 RX ADMIN — HYDROXYZINE HYDROCHLORIDE 25 MG: 25 TABLET ORAL at 22:57

## 2024-12-31 RX ADMIN — WATER 1000 MG: 1 INJECTION INTRAMUSCULAR; INTRAVENOUS; SUBCUTANEOUS at 14:01

## 2024-12-31 RX ADMIN — ASPIRIN 81 MG: 81 TABLET, COATED ORAL at 09:30

## 2024-12-31 RX ADMIN — ENOXAPARIN SODIUM 40 MG: 100 INJECTION SUBCUTANEOUS at 09:32

## 2024-12-31 RX ADMIN — ACETAMINOPHEN 650 MG: 325 TABLET ORAL at 22:25

## 2024-12-31 RX ADMIN — SODIUM CHLORIDE, PRESERVATIVE FREE 10 ML: 5 INJECTION INTRAVENOUS at 01:48

## 2024-12-31 ASSESSMENT — PAIN DESCRIPTION - LOCATION: LOCATION: GENERALIZED

## 2024-12-31 ASSESSMENT — PAIN SCALES - GENERAL: PAINLEVEL_OUTOF10: 6

## 2024-12-31 NOTE — TELEPHONE ENCOUNTER
Please schedule patient for a hospital follow up appointment in clinic    Neurology provider: any provider      In person or virtual:     When: 4 weeks    Diagnosis/reason for follow up:  dementia    Thanks!

## 2024-12-31 NOTE — TELEPHONE ENCOUNTER
Ethel from [employer] called in requesting re-fax order for gel overlay. Please fax to [phone]

## 2025-01-01 VITALS
HEIGHT: 66 IN | TEMPERATURE: 98.4 F | WEIGHT: 165 LBS | DIASTOLIC BLOOD PRESSURE: 73 MMHG | BODY MASS INDEX: 26.52 KG/M2 | HEART RATE: 70 BPM | RESPIRATION RATE: 19 BRPM | OXYGEN SATURATION: 95 % | SYSTOLIC BLOOD PRESSURE: 179 MMHG

## 2025-01-01 PROCEDURE — 6370000000 HC RX 637 (ALT 250 FOR IP): Performed by: INTERNAL MEDICINE

## 2025-01-01 PROCEDURE — 6370000000 HC RX 637 (ALT 250 FOR IP): Performed by: STUDENT IN AN ORGANIZED HEALTH CARE EDUCATION/TRAINING PROGRAM

## 2025-01-01 PROCEDURE — 6360000002 HC RX W HCPCS: Performed by: STUDENT IN AN ORGANIZED HEALTH CARE EDUCATION/TRAINING PROGRAM

## 2025-01-01 PROCEDURE — 2500000003 HC RX 250 WO HCPCS: Performed by: STUDENT IN AN ORGANIZED HEALTH CARE EDUCATION/TRAINING PROGRAM

## 2025-01-01 RX ORDER — DOXYCYCLINE HYCLATE 100 MG
100 TABLET ORAL 2 TIMES DAILY
Qty: 3 TABLET | Refills: 0 | Status: SHIPPED | OUTPATIENT
Start: 2025-01-01 | End: 2025-01-03

## 2025-01-01 RX ORDER — GUAIFENESIN 200 MG/10ML
200 LIQUID ORAL EVERY 4 HOURS PRN
Qty: 473 ML | Refills: 0 | Status: SHIPPED | OUTPATIENT
Start: 2025-01-01

## 2025-01-01 RX ORDER — HYDROXYZINE HYDROCHLORIDE 10 MG/1
10 TABLET, FILM COATED ORAL 3 TIMES DAILY PRN
Qty: 30 TABLET | Refills: 0 | Status: SHIPPED | OUTPATIENT
Start: 2025-01-01

## 2025-01-01 RX ORDER — AMLODIPINE BESYLATE 5 MG/1
2.5 TABLET ORAL DAILY
Status: DISCONTINUED | OUTPATIENT
Start: 2025-01-01 | End: 2025-01-01 | Stop reason: HOSPADM

## 2025-01-01 RX ADMIN — ENOXAPARIN SODIUM 40 MG: 100 INJECTION SUBCUTANEOUS at 08:51

## 2025-01-01 RX ADMIN — DOXYCYCLINE HYCLATE 100 MG: 100 TABLET, COATED ORAL at 08:48

## 2025-01-01 RX ADMIN — THIAMINE HYDROCHLORIDE 100 MG: 100 INJECTION, SOLUTION INTRAMUSCULAR; INTRAVENOUS at 08:49

## 2025-01-01 RX ADMIN — WATER 1000 MG: 1 INJECTION INTRAMUSCULAR; INTRAVENOUS; SUBCUTANEOUS at 11:16

## 2025-01-01 RX ADMIN — ASPIRIN 81 MG: 81 TABLET, COATED ORAL at 08:47

## 2025-01-01 RX ADMIN — SODIUM CHLORIDE, PRESERVATIVE FREE 10 ML: 5 INJECTION INTRAVENOUS at 08:56

## 2025-01-01 RX ADMIN — PAROXETINE HYDROCHLORIDE 20 MG: 20 TABLET, FILM COATED ORAL at 08:48

## 2025-01-01 RX ADMIN — AMLODIPINE BESYLATE 2.5 MG: 5 TABLET ORAL at 12:48

## 2025-01-01 ASSESSMENT — PAIN SCALES - GENERAL
PAINLEVEL_OUTOF10: 0
PAINLEVEL_OUTOF10: 0

## 2025-01-01 NOTE — PROGRESS NOTES
End of Shift Note    Bedside shift change report given to Claudia POLLARD  (oncoming nurse) by Ann-Marie Parish RN (offgoing nurse).  Report included the following information SBAR    Shift worked:  Days      Shift summary and any significant changes:     1155 Left floor for MRI  1400 returned to floor, tolerated fairly well      Concerns for physician to address:       Zone phone for oncoming shift:          Activity:  Level of Assistance: Maximum assist, patient does 25-49%  Number times ambulated in hallways past shift: 0  Number of times OOB to chair past shift: 0    Cardiac:   Cardiac Monitoring: Yes      Cardiac Rhythm: Sinus rhythm    Access:  Current line(s): PIV     Genitourinary:   Urinary Status: Voiding, External catheter    Respiratory:   O2 Device: None (Room air)  Chronic home O2 use?: NO  Incentive spirometer at bedside: NO    GI:  Last BM (including prior to admit): 12/30/24  Current diet:  ADULT DIET; Regular; 3 carb choices (45 gm/meal)  Passing flatus: YES    Pain Management:   Patient states pain is manageable on current regimen: YES    Skin:  Erlin Scale Score: 16  Interventions: Wound Offloading (Prevention Methods): Bed, pressure redistribution/air    Patient Safety:  Fall Risk: Nursing Judgement-Fall Risk High(Add Comments): Yes  Fall Risk Interventions  Nursing Judgement-Fall Risk High(Add Comments): Yes  Toilet Every 2 Hours-In Advance of Need: Yes  Hourly Visual Checks: Awake, In bed  Fall Visual Posted: Fall sign posted, Socks  Room Door Open: Deferred to promote rest  Alarm On: Bed, Chair  Patient Moved Closer to Nursing Station: No    Active Consults:   IP CONSULT TO HOSPITALIST  IP CONSULT TO NEUROLOGY    Length of Stay:  Expected LOS: 4  Actual LOS: 3    Ann-Marie Parish RN                            
      Hospitalist Progress Note    NAME:   Keysha Pacheco   : 1932   MRN: [id number]     Date/Time: 2024 2:10 PM  Patient PCP: April Bowles MD    Estimated discharge date:25  Barriers: clinical improvement, neurology , ptot      Assessment / Plan:    Cva eval  Right facial droop  Multifocal pneumonia  Worsening weakness/fatigue  AMS from baseline  --CXR shows mild multifocal pneumonia  --COVID-negative, influenza negative,, no leukocytosis on CBC  - Troponins within normal limits 39, elevated proBNP 5756  - c/w telemetry  - c/w ceftriaxone and doxycycline  - pending echo  - CT head on admission was negative for acute pathology, did show 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  -MRI head pending  - PT OT, speech eval  --appreciate neurology following     Hypertensive urgency, resolved  - Was on nicardipine drip, weaned off  - Resume amlodipine, as needed hydralazine     Mood disorder  - Continue Paxil     HLD continue home medication pravastatin          Medical Decision Making:   I personally reviewed labs: CBC, BMP  I personally reviewed imaging: CT head  I personally reviewed EKG: Normal sinus rhythm      Discussed case with: Family bedside        Code Status: DNR  DVT Prophylaxis: Lovenox    Subjective:     Pt is awake, pleasantly confused.  Daughter at bedside, no acute complaint.  Tolerating po intake.  MRI brain is still pending.     Objective:     VITALS:   Last 24hrs VS reviewed since prior progress note. Most recent are:  Patient Vitals for the past 24 hrs:   BP Temp Temp src Pulse Resp SpO2   24 0800 (!) 163/66 -- -- 83 -- 94 %   24 0751 (!) 178/65 97.4 °F (36.3 °C) Oral 89 19 93 %   24 0329 (!) 133/55 98.2 °F (36.8 °C) Oral 76 18 99 %   24 2330 (!) 
      Hospitalist Progress Note    NAME:   Keysha Pacheco   : 1932   MRN: [id number]     Date/Time: 2024 3:09 PM  Patient PCP: April Bowles MD    Estimated discharge date:25  Barriers: clinical improvement, neurology , ptot      Assessment / Plan:    Cva eval  Right facial droop  Multifocal pneumonia  Worsening weakness/fatigue  AMS from baseline  --CXR shows mild multifocal pneumonia  --COVID-negative, influenza negative,, no leukocytosis on CBC  - Troponins within normal limits 39, elevated proBNP 5756  - c/w telemetry  - c/w ceftriaxone and doxycycline  - pending echo  - CT head on admission was negative for acute pathology, did show 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  -Neuro telemetry recommendations to allow for permissive hypertension, IV fluids, will hold antihypertensive for now  - PT OT, speech eval          Hypertensive urgency, resolved  - Was on nicardipine drip, weaned off  - Resume home medications, as needed hydralazine     Mood disorder  - Continue Paxil     HLD continue home medication pravastatin          Medical Decision Making:   I personally reviewed labs: CBC, BMP  I personally reviewed imaging: CT head  I personally reviewed EKG: Normal sinus rhythm      Discussed case with: Family bedside        Code Status: DNR  DVT Prophylaxis: Lovenox  GI Prophylaxis:    Subjective:       Seen and evaluated the patient at bedside, patient was sitting in the bed, eating breakfast with family at bedside, patient was conversational family.    Objective:     VITALS:   Last 24hrs VS reviewed since prior progress note. Most recent are:  Patient Vitals for the past 24 hrs:   BP Temp Temp src Pulse Resp SpO2 Height Weight   24 1203 (!) 118/53 -- -- 70 21 -- -- -- 
0700 assumed care  1206 Patient to be discharged   1322 Patient discharged with all belongings   
End of Shift Note    Bedside shift change report given to RN (oncoming nurse) by Awa Head RN (offgoing nurse).  Report included the following information SBAR    Shift worked:  [id number], 12/29     Shift summary and any significant changes:     Continuing cardene drip, monitoring BP. Afebrile. Confused, but redirectable. Sleeping now.      Concerns for physician to address:  BP     Zone phone for oncoming shift:   N/A         Awa Head RN                            
End of Shift Note    Bedside shift change report given to RN (oncoming nurse) by Keira Navarrete RN (offgoing nurse).  Report included the following information SBAR, Kardex, MAR, Recent Results, and Med Rec Status    Shift worked:  700-1900     Shift summary and any significant changes:    weaned off cardene gtt this am. Patient found to be orthostatic by PT/OT. RRT/ Stroke alert called for difficulty arousing patient and R-sided weakness and facial droop. Level I CT ordered and obtained. Neuro consult ordered to be called tomorrow. Patient back to baseline neuro status with no right-sided weakness. Holding HTN medications, fluids ordered based on neuro tele recommendation for permisssive HTN. MRI of brain ordered. screening form complete   Concerns for physician to address:  None     Zone phone for oncoming shift:   none       Activity:  Level of Assistance: Moderate assist, patient does 50-74%  Number times ambulated in hallways past shift: 0  Number of times OOB to chair past shift: 0    Cardiac:   Cardiac Monitoring: Yes      Cardiac Rhythm: Sinus rhythm    Access:  Current line(s): PIV     Genitourinary:   Urinary Status: Incontinent    Respiratory:   O2 Device: None (Room air)  Chronic home O2 use?: NO  Incentive spirometer at bedside: NO    GI:  Last BM (including prior to admit): 12/30/24  Current diet:  ADULT DIET; Regular; 3 carb choices (45 gm/meal)  Passing flatus: YES    Pain Management:   Patient states pain is manageable on current regimen: YES    Skin:  Erlin Scale Score: 17  Interventions: Wound Offloading (Prevention Methods): Turning    Patient Safety:  Fall Risk: Nursing Judgement-Fall Risk High(Add Comments): Yes  Fall Risk Interventions  Nursing Judgement-Fall Risk High(Add Comments): Yes  Toilet Every 2 Hours-In Advance of Need: Yes  Hourly Visual Checks: In bed  Fall Visual Posted: Armband  Room Door Open: Yes  Alarm On: Bed, Chair    Active Consults:   IP CONSULT TO HOSPITALIST  IP 
MRI PENDING    Completion of MRI Screening Sheet    Fax to [phone] when completed    Please call [phone]  When this is done    Thank You  
Nursing contacted Nocturnist/cross cover provider via non-urgent messaging system [employer] and notified patient asking for something for cough, states unable to sleep due to cough frequent non-productive. Pt already on empiric antbx. No other concerns reported. No acute distress reported. No other information provided by nurse. VSS.     Ordered mucinex liq po prn per pt request, consider tessalon perles in addition if continues to have concerns for inability to sleep with cough, turn cough deep breathe, ics, continue remaining plan as per day drDell Will defer further evaluation/management to the day shift primary attending care team. Patient denies any further complaints or concerns.     Nursing to notify Hospitalist for further/continued concerns. Will remain available overnight for further concerns if nursing/patient needs. Please note, there are RRT systems in this hospital in place that if nursing has acute or critical patient condition change or concern, this is to help facilitate and notify that patient needs immediate bedside evaluation by a provider.     Non-billable note.       
Nursing contacted Nocturnist/cross cover provider via non-urgent messaging system [employer] and notified patient anxious, agitated, attempting to climb out of the bed, asking for something help relax and with agitation. Has prn atarax ordered for anxiety, had melatonin already ordered as well. No other concerns reported. No acute distress reported. No other information provided by nurse. VSS.     Ordered atarax 25mg po x1, as not reported pulling lines or combative behaviors, will add one time dose help facilitate relaxation and sleep, should pt become risk of harm to herself or staff with behaviors of line pulling or combativeness would consider additional orders such as meds and sitter for safety if needed overnight- nurse to d/w dr in the am. Will defer further evaluation/management to the day shift primary attending care team. Patient denies any further complaints or concerns.     Nursing to notify Hospitalist for further/continued concerns. Will remain available overnight for further concerns if nursing/patient needs. Please note, there are RRT systems in this hospital in place that if nursing has acute or critical patient condition change or concern, this is to help facilitate and notify that patient needs immediate bedside evaluation by a provider.     Non-billable note.       
Occupational Therapy    Chart reviewed and evaluation attempted. Transport taking pt [facility] for MRI. Will defer and continue to follow.   
Occupational Therapy    OT referral received, chart reviewed and interventions attempted. Pt sleeping, momentarily waking with verbal and tactile stimuli, then immediately falling back asleep. Per family and chart, pt was awake and alert recently. Nursing notified. RRT called. Will defer and continue to follow.   
Routine EEG completed.   
her bilateral upper and lower extremities.  There was no clear focal weakness      Sensory:  Upper extremity: intact to pp,  Lower extremity: Patchy sensory loss in her bilateral lower extremities  The patient had difficult time with comprehending sensory testing,       Reflexes:    Right Left  Biceps  2 2  Triceps 2 2  Brachiorad. 2 2  Patella  1 1  Achilles 1 1    Plantar response:  flexor bilaterally      Cerebellar testing:  no tremor apparent, finger/nose and cristóbal were intact.  Slow    Gait: Not assessed due to concerns over safety      Labs:     Lab Results   Component Value Date/Time     12/29/2024 10:25 AM    K 4.1 12/29/2024 10:25 AM     12/29/2024 10:25 AM    BUN 17 12/29/2024 10:25 AM    WBC 10.2 12/29/2024 10:25 AM    HCT 37.8 12/29/2024 10:25 AM    HGB 12.1 12/29/2024 10:25 AM     12/29/2024 10:25 AM    LDL 94.8 03/01/2023 12:01 PM               I spent   35  minutes providing care to this  acutely ill inpatient with > 50% of the time counseling and assisting in the coordination of care of the patient on the patient's hospital floor/unit.           Principal Problem:    CAP (community acquired pneumonia) due to Pneumococcus (HCC)  Active Problems:    Right sided weakness    Multifocal pneumonia    Unresponsive episode    Memory loss  Resolved Problems:    * No resolved hospital problems. *                 Signed By:  Jeremias Lua DO FAAN    December 31, 2024

## 2025-01-01 NOTE — DISCHARGE SUMMARY
Discharge Summary    Name: Keysha Pacheco  [id number]  YOB: 1932 (Age: 89 y.o.)   Date of Admission: 12/29/2024  Date of Discharge: 1/1/2025  Attending Physician: Michelle Irby MD    Discharge Diagnosis:   Multifocal pneumonia  Acute metabolic encephalopathy  Dementia  Stroke ruled  Hypertensive urgency  Mood disorder  Hyperlipidemia    Consultations:  IP CONSULT TO HOSPITALIST  IP CONSULT TO NEUROLOGY      Brief Admission History/Reason for Admission Per Terrance Patel MD:   Keysha Pacheco is a 89 y.o.  female with PMHx significant for HTN, HLD, dementia who comes in with complaints of weakness, cough for the past 2 to 3 days.  Family at bedside, most of the history was obtained from daughter and granddaughter, who reports that she has had flulike symptoms for over the past week, and worsening for the past 2 to 3 days.  Her symptoms worsen with coughing, weakness and shortness of breath.  Denied any fevers, or complained of chills, denied any other symptoms of pain or burning on urination, constipation, or any other issues.  Does report that she is more altered and weak than her baseline.  She usually needs assistant with with her ADLs and mobility.  Denied any recent travel or sick contacts.     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

## 2025-01-01 NOTE — PLAN OF CARE
Problem: Chronic Conditions and Co-morbidities  Goal: Patient's chronic conditions and co-morbidity symptoms are monitored and maintained or improved  12/31/2024 0824 by Ann-Marie Parish RN  Outcome: Progressing     Problem: Discharge Planning  Goal: Discharge to home or other facility with appropriate resources  12/31/2024 0824 by Ann-Marie Parish RN  Outcome: Progressing     Problem: Confusion  Goal: Confusion, delirium, dementia, or psychosis is improved or at baseline  Description: INTERVENTIONS:  1. Assess for possible contributors to thought disturbance, including medications, impaired vision or hearing, underlying metabolic abnormalities, dehydration, psychiatric diagnoses, and notify attending LIP  2. [place] high risk fall precautions, as indicated  3. Provide frequent short contacts to provide reality reorientation, refocusing and direction  4. Decrease environmental stimuli, including noise as appropriate  5. Monitor and intervene to maintain adequate nutrition, hydration, elimination, sleep and activity  6. If unable to ensure safety without constant attention obtain sitter and review sitter guidelines with assigned personnel  7. Initiate Psychosocial CNS and Spiritual Care consult, as indicated  12/31/2024 0824 by Ann-Marie Parish RN  Outcome: Progressing     Problem: Skin/Tissue Integrity  Goal: Absence of new skin breakdown  Description: 1.  Monitor for areas of redness and/or skin breakdown  2.  Assess vascular access sites hourly  3.  Every 4-6 hours minimum:  Change oxygen saturation probe site  4.  Every 4-6 hours:  If on nasal continuous positive airway pressure, respiratory therapy assess nares and determine need for appliance change or resting period.  12/31/2024 0824 by Ann-Marie Parish RN  Outcome: Progressing     Problem: Safety - Adult  Goal: Free from fall injury  12/31/2024 2137 by Claudia Abdul RN  Outcome: Progressing  12/31/2024 0824 by Ann-Marie Parish RN  Outcome: Progressing   
  Problem: Chronic Conditions and Co-morbidities  Goal: Patient's chronic conditions and co-morbidity symptoms are monitored and maintained or improved  Outcome: Progressing     Problem: Discharge Planning  Goal: Discharge to home or other facility with appropriate resources  Outcome: Progressing     Problem: Confusion  Goal: Confusion, delirium, dementia, or psychosis is improved or at baseline  Description: INTERVENTIONS:  1. Assess for possible contributors to thought disturbance, including medications, impaired vision or hearing, underlying metabolic abnormalities, dehydration, psychiatric diagnoses, and notify attending LIP  2. [place] high risk fall precautions, as indicated  3. Provide frequent short contacts to provide reality reorientation, refocusing and direction  4. Decrease environmental stimuli, including noise as appropriate  5. Monitor and intervene to maintain adequate nutrition, hydration, elimination, sleep and activity  6. If unable to ensure safety without constant attention obtain sitter and review sitter guidelines with assigned personnel  7. Initiate Psychosocial CNS and Spiritual Care consult, as indicated  Outcome: Progressing     Problem: Skin/Tissue Integrity  Goal: Absence of new skin breakdown  Description: 1.  Monitor for areas of redness and/or skin breakdown  2.  Assess vascular access sites hourly  3.  Every 4-6 hours minimum:  Change oxygen saturation probe site  4.  Every 4-6 hours:  If on nasal continuous positive airway pressure, respiratory therapy assess nares and determine need for appliance change or resting period.  Outcome: Progressing     Problem: Safety - Adult  Goal: Free from fall injury  Outcome: Progressing     Problem: ABCDS Injury Assessment  Goal: Absence of physical injury  Outcome: Progressing     
  Problem: Chronic Conditions and Co-morbidities  Goal: Patient's chronic conditions and co-morbidity symptoms are monitored and maintained or improved  Outcome: Progressing     Problem: Discharge Planning  Goal: Discharge to home or other facility with appropriate resources  Outcome: Progressing     Problem: Confusion  Goal: Confusion, delirium, dementia, or psychosis is improved or at baseline  Description: INTERVENTIONS:  1. Assess for possible contributors to thought disturbance, including medications, impaired vision or hearing, underlying metabolic abnormalities, dehydration, psychiatric diagnoses, and notify attending LIP  2. [place] high risk fall precautions, as indicated  3. Provide frequent short contacts to provide reality reorientation, refocusing and direction  4. Decrease environmental stimuli, including noise as appropriate  5. Monitor and intervene to maintain adequate nutrition, hydration, elimination, sleep and activity  6. If unable to ensure safety without constant attention obtain sitter and review sitter guidelines with assigned personnel  7. Initiate Psychosocial CNS and Spiritual Care consult, as indicated  Outcome: Progressing     Problem: Skin/Tissue Integrity  Goal: Absence of new skin breakdown  Description: 1.  Monitor for areas of redness and/or skin breakdown  2.  Assess vascular access sites hourly  3.  Every 4-6 hours minimum:  Change oxygen saturation probe site  4.  Every 4-6 hours:  If on nasal continuous positive airway pressure, respiratory therapy assess nares and determine need for appliance change or resting period.  Outcome: Progressing     Problem: Safety - Adult  Goal: Free from fall injury  12/31/2024 0824 by Ann-Marie Parish, RN  Outcome: Progressing  12/30/2024 2328 by Claudia Abdul, RN  Outcome: Progressing     Problem: ABCDS Injury Assessment  Goal: Absence of physical injury  Outcome: Progressing     
  Problem: Chronic Conditions and Co-morbidities  Goal: Patient's chronic conditions and co-morbidity symptoms are monitored and maintained or improved  Outcome: Progressing     Problem: Discharge Planning  Goal: Discharge to home or other facility with appropriate resources  Outcome: Progressing     Problem: Confusion  Goal: Confusion, delirium, dementia, or psychosis is improved or at baseline  Description: INTERVENTIONS:  1. Assess for possible contributors to thought disturbance, including medications, impaired vision or hearing, underlying metabolic abnormalities, dehydration, psychiatric diagnoses, and notify attending LIP  2. [place] high risk fall precautions, as indicated  3. Provide frequent short contacts to provide reality reorientation, refocusing and direction  4. Decrease environmental stimuli, including noise as appropriate  5. Monitor and intervene to maintain adequate nutrition, hydration, elimination, sleep and activity  6. If unable to ensure safety without constant attention obtain sitter and review sitter guidelines with assigned personnel  7. Initiate Psychosocial CNS and Spiritual Care consult, as indicated  Outcome: Progressing     Problem: Skin/Tissue Integrity  Goal: Absence of new skin breakdown  Description: 1.  Monitor for areas of redness and/or skin breakdown  2.  Assess vascular access sites hourly  3.  Every 4-6 hours minimum:  Change oxygen saturation probe site  4.  Every 4-6 hours:  If on nasal continuous positive airway pressure, respiratory therapy assess nares and determine need for appliance change or resting period.  Outcome: Progressing     Problem: Safety - Adult  Goal: Free from fall injury  1/1/2025 0739 by Ann-Marie Parish, RN  Outcome: Progressing  12/31/2024 2137 by Claudia Abdul, RN  Outcome: Progressing     Problem: ABCDS Injury Assessment  Goal: Absence of physical injury  Outcome: Progressing     Problem: Pain  Goal: Verbalizes/displays adequate comfort level 
  Problem: Safety - Adult  Goal: Free from fall injury  Outcome: Progressing     Problem: SLP Adult - Impaired Swallowing  Goal: By Discharge: Advance to least restrictive diet without signs or symptoms of aspiration for planned discharge setting.  See evaluation for individualized goals.  Description: Speech Pathology Goals  Initiated 12/30/2024     1. Patient will tolerate least restrictive diet without signs of aspiration or decline in respiratory status within 7 days.  12/30/2024 1137 by Jesus Stokes, SLP  Outcome: Progressing     Problem: Physical Therapy - Adult  Goal: By Discharge: Performs mobility at highest level of function for planned discharge setting.  See evaluation for individualized goals.  Description: FUNCTIONAL STATUS PRIOR TO ADMISSION: Patient was modified independent using a rolling walker for functional mobility.    HOME SUPPORT PRIOR TO ADMISSION: The patient lived with her daughter in a 1-story home.    Physical Therapy Goals  Initiated 12/30/2024  1.  Patient will move from supine to sit and sit to supine in bed with supervision/set-up within 7 day(s).    2.  Patient will perform sit to stand with supervision/set-up within 7 day(s).  3.  Patient will transfer from bed to chair and chair to bed with supervision/set-up using the least restrictive device within 7 day(s).  4.  Patient will ambulate with supervision/set-up for 50 feet with the least restrictive device within 7 day(s).   5.  Patient will ascend/descend 3 stairs with 1 handrail(s) with contact guard assist within 7 day(s).   12/30/2024 1309 by Radha Menchaca, PT  Outcome: Progressing     
hearing.    OBJECTIVE:     Past Medical History:   Diagnosis Date    GERD (gastroesophageal reflux disease) 2/11/2010    Glaucoma, open angle 10/29/2010    Headache     HTN (hypertension) 2/11/2010    Hypercholesterolemia     Mixed hyperlipidemia 2/11/2010    Type II or unspecified type diabetes mellitus with renal manifestations, not stated as uncontrolled(250.40) 2/11/2010    Type II or unspecified type diabetes mellitus with renal manifestations, not stated as uncontrolled(250.40) 2/11/2010     Past Surgical History:   Procedure Laterality Date    COLONOSCOPY  10/5/2005    tics and int. hem; Dr. Segura; 10 year repeat    EGD TRANSORAL BIOPSY SINGLE/MULTIPLE  7/1/2013         HELEN AND BSO (CERVIX REMOVED)       Prior Level of Function/Home Situation:   Social/Functional History  Lives With: Daughter  Type of Home: House  Home Layout: One level  Home Access: Stairs to enter with rails  Entrance Stairs - Number of Steps: 3  Entrance Stairs - Rails: Both (too wide to reach both)  Bathroom Shower/Tub:  (family performs sponge baths)  Home Equipment: Walker - Rolling  Has the patient had two or more falls in the past year or any fall with injury in the past year?: Yes (family reports 3-4 falls)  Receives Help From: Family  Prior Level of Assist for ADLs: Needs assistance  Prior Level of Assist for Ambulation: Independent household ambulator, with or without device  Prior Level of Assist for Transfers: Needs assistance  Active : No    Baseline Assessment:  Current Diet : Regular  Current Liquid Diet : Thin  Prior Dysphagia History: Patient's daughter reports patient gets choked with liquids and even on her own saliva occasionally       Cognitive and Communication Status:  Neurologic State: Alert  Orientation Level: Oriented to person  Cognition: Decreased command following (for 1-step oral motor commands)    Dysphagia:  Oral Assessment:  Oral Motor   Labial: No impairment  Dentition: Natural (Missing natural 
Minimum assistance;Additional time;Assist X2  Sit to Supine: Minimum assistance;Assist X2  Scooting: Moderate assistance  Transfers:     Transfer Training  Transfer Training: Yes  Interventions: Verbal cues;Safety awareness training  Sit to Stand: Minimum assistance;Assist X1  Stand to Sit: Minimum assistance;Assist X1  Balance:     Balance  Sitting: Impaired  Sitting - Static: Good (unsupported)  Sitting - Dynamic: Fair (occasional)  Standing: Impaired  Standing - Static: Fair;Constant support  Standing - Dynamic: Fair;Constant support  Ambulation/Gait Training:       Gait  Gait Training: Yes  Overall Level of Assistance: Minimum assistance  Distance (ft): 3 Feet  Assistive Device: Gait belt;Walker, rolling  Step Length: Left shortened;Right shortened  Gait Abnormalities: Decreased step clearance;Steppage gait    Wheelchair Management  Wheelchair Management: No                                                                                                                                                                                                                                        [facility]®      Basic Mobility Inpatient Short Form (6-Clicks) Version 2  How much HELP from another person do you currently need... (If the patient hasn't done an activity recently, how much help from another person do you think they would need if they tried?) Total A Lot A Little None   1.  Turning from your back to your side while in a flat bed without using bedrails? []  1 [x]  2 []  3  []  4   2.  Moving from lying on your back to sitting on the side of a flat bed without using bedrails? []  1 [x]  2 []  3  []  4   3.  Moving to and from a bed to a chair (including a wheelchair)? []  1 []  2 [x]  3  []  4   4. Standing up from a chair using your arms (e.g. wheelchair or bedside chair)? []  1 [x]  2 []  3  []  4   5.  Walking in hospital room? []  1 [x]  2 []  3  []  4   6.  Climbing 3-5 steps with a railing? []

## 2025-01-02 ENCOUNTER — TELEPHONE (OUTPATIENT)
Age: 89
End: 2025-01-02

## 2025-01-02 DIAGNOSIS — H91.90 HEARING LOSS, UNSPECIFIED HEARING LOSS TYPE, UNSPECIFIED LATERALITY: Primary | ICD-10-CM

## 2025-01-02 NOTE — TELEPHONE ENCOUNTER
States recently at hospital    Hearing concern  States since the MRI, pt has extreme difficulty hearing  States reported to hospital, no resolution    States needs advise how to proceed.

## 2025-01-02 NOTE — TELEPHONE ENCOUNTER
Medicare covers hearing evaluation with audiologist.  Please have patient contact [facility] to schedule an appointment.    Address: [address], [place] [address]  Phone: [phone]

## 2025-01-03 ENCOUNTER — TELEPHONE (OUTPATIENT)
Age: 89
End: 2025-01-03

## 2025-01-17 ENCOUNTER — OFFICE VISIT (OUTPATIENT)
Age: 89
End: 2025-01-17
Payer: MEDICARE

## 2025-01-17 VITALS
BODY MASS INDEX: 26.63 KG/M2 | HEIGHT: 66 IN | TEMPERATURE: 97.5 F | HEART RATE: 69 BPM | RESPIRATION RATE: 18 BRPM | OXYGEN SATURATION: 98 % | DIASTOLIC BLOOD PRESSURE: 75 MMHG | SYSTOLIC BLOOD PRESSURE: 180 MMHG

## 2025-01-17 DIAGNOSIS — E78.00 PURE HYPERCHOLESTEROLEMIA, UNSPECIFIED: ICD-10-CM

## 2025-01-17 DIAGNOSIS — R54 AGE-RELATED PHYSICAL DEBILITY: ICD-10-CM

## 2025-01-17 DIAGNOSIS — I67.9 CEREBROVASCULAR DISEASE: ICD-10-CM

## 2025-01-17 DIAGNOSIS — I10 HYPERTENSION, MALIGNANT: Primary | ICD-10-CM

## 2025-01-17 DIAGNOSIS — F02.B0 DEMENTIA IN OTHER DISEASES CLASSIFIED ELSEWHERE, MODERATE, WITHOUT BEHAVIORAL DISTURBANCE, PSYCHOTIC DISTURBANCE, MOOD DISTURBANCE, AND ANXIETY (HCC): ICD-10-CM

## 2025-01-17 DIAGNOSIS — D32.9 MENINGIOMA (HCC): ICD-10-CM

## 2025-01-17 PROCEDURE — 1111F DSCHRG MED/CURRENT MED MERGE: CPT | Performed by: FAMILY MEDICINE

## 2025-01-17 PROCEDURE — 99214 OFFICE O/P EST MOD 30 MIN: CPT | Performed by: FAMILY MEDICINE

## 2025-01-17 PROCEDURE — 1123F ACP DISCUSS/DSCN MKR DOCD: CPT | Performed by: FAMILY MEDICINE

## 2025-01-17 PROCEDURE — 1160F RVW MEDS BY RX/DR IN RCRD: CPT | Performed by: FAMILY MEDICINE

## 2025-01-17 PROCEDURE — 1036F TOBACCO NON-USER: CPT | Performed by: FAMILY MEDICINE

## 2025-01-17 PROCEDURE — 1159F MED LIST DOCD IN RCRD: CPT | Performed by: FAMILY MEDICINE

## 2025-01-17 PROCEDURE — 1090F PRES/ABSN URINE INCON ASSESS: CPT | Performed by: FAMILY MEDICINE

## 2025-01-17 PROCEDURE — G8427 DOCREV CUR MEDS BY ELIG CLIN: HCPCS | Performed by: FAMILY MEDICINE

## 2025-01-17 PROCEDURE — G8419 CALC BMI OUT NRM PARAM NOF/U: HCPCS | Performed by: FAMILY MEDICINE

## 2025-01-17 RX ORDER — AMLODIPINE BESYLATE 5 MG/1
5 TABLET ORAL
Qty: 90 TABLET | Refills: 1 | Status: SHIPPED | OUTPATIENT
Start: 2025-01-17

## 2025-01-17 ASSESSMENT — PATIENT HEALTH QUESTIONNAIRE - PHQ9
SUM OF ALL RESPONSES TO PHQ QUESTIONS 1-9: 0
2. FEELING DOWN, DEPRESSED OR HOPELESS: NOT AT ALL
SUM OF ALL RESPONSES TO PHQ9 QUESTIONS 1 & 2: 0
SUM OF ALL RESPONSES TO PHQ QUESTIONS 1-9: 0
1. LITTLE INTEREST OR PLEASURE IN DOING THINGS: NOT AT ALL

## 2025-01-17 NOTE — PATIENT INSTRUCTIONS
Blood pressure medications:       Continue metoprolol the same.   Stop amlodipine 2.5mg tablet.  Start amlodipine 5mg one at bedtime.   Can take 2 of the 2.5mg tablets until you  the new prescription.  Call or message with BP readings in one week.

## 2025-01-17 NOTE — PROGRESS NOTES
Keysha Pacheco is a 89 y.o. female who presents for follow-up after hospitalization.  12/29-1/1.  In with daughter.     Multifocal pneumonia with acute metabolic encephalopathy.  Negative COVID, negative influenza.  Discharged with Augmentin and doxycycline to complete antibiotic course. No respiratory symptoms now.     During hospitalization had right arm weakness and facial droop.  CT negative for CVA, MRI with chronic cerebrovascular changes, sella meningioma seen.  Seen by neurology.   To have home health PT. Using wheelchair today, ambulatory with assistance    Hypertensive urgency during hospitalization received nicardipine drip.  At discharge resumed amlodipine and metoprolol.  Daughter reports is getting BP medication regularly.  BP at home,  150's.      Has advanced dementia. Per family anxious at times.  Not sure of taking paxil right now.          Past Medical History:   Diagnosis Date    GERD (gastroesophageal reflux disease) 2/11/2010    Glaucoma, open angle 10/29/2010    Headache     HTN (hypertension) 2/11/2010    Hypercholesterolemia     Mixed hyperlipidemia 2/11/2010    Type II or unspecified type diabetes mellitus with renal manifestations, not stated as uncontrolled(250.40) 2/11/2010    Type II or unspecified type diabetes mellitus with renal manifestations, not stated as uncontrolled(250.40) 2/11/2010       Family History   Problem Relation Age of Onset    Diabetes Brother     Other Father         accidental death when pt was a little girl    Cancer Mother         lung    Hypertension Mother     Hypertension Brother     Cancer Brother         stomach        Social History     Socioeconomic History    Marital status:      Spouse name: Not on file    Number of children: Not on file    Years of education: Not on file    Highest education level: Not on file   Occupational History    Not on file   Tobacco Use    Smoking status: Never    Smokeless tobacco: Never   Substance and Sexual

## 2025-01-17 NOTE — PROGRESS NOTES
\"Have you been to the ER, urgent care clinic since your last visit?  Hospitalized since your last visit?\"    Hospital/ Pneumonia    “Have you seen or consulted any other health care providers outside our system since your last visit?”    NO

## 2025-01-31 DIAGNOSIS — I10 ESSENTIAL (PRIMARY) HYPERTENSION: ICD-10-CM

## 2025-01-31 RX ORDER — METOPROLOL SUCCINATE 25 MG/1
25 TABLET, EXTENDED RELEASE ORAL DAILY
Qty: 90 TABLET | Refills: 3 | Status: SHIPPED | OUTPATIENT
Start: 2025-01-31

## 2025-02-05 ENCOUNTER — TELEPHONE (OUTPATIENT)
Age: 89
End: 2025-02-05

## 2025-02-05 NOTE — TELEPHONE ENCOUNTER
(Key: [id number]) - [id number]  hydrOXYzine HCl 10MG tablets  status: PA Response - ApprovedCreated: February 4th, 2025 [phone]: February 5th, 2025

## 2025-04-10 ENCOUNTER — TELEPHONE (OUTPATIENT)
Age: 89
End: 2025-04-10

## 2025-04-10 NOTE — TELEPHONE ENCOUNTER
Steff, with [facility].    Phone [phone]  Fax  [phone]    Regarding form:  medical necessity [place]    For incontinence supplies.  Faxed to [facility] on April 2  Was this received?  Caller provided with nurse station as alternate fax    Call to advise.                Appointments:  Last seen at [facility]:   1/17/2025   Future appointment [facility]:  5/14/2025         .            Caller confirms readback of documented phone/fax number(s) as correct.

## 2025-05-11 NOTE — PROGRESS NOTES
Keysha Pacheco is a 89 y.o. female who presents for follow-up.  In with daughter.    Patient with advanced dementia.  Per daughter, patient is more agitated recently.  Minimally interactive.   Using wheelchair today.  Ambulatory with assistance of family, no assist device. Wearing depends, is aware of need for BM.      Treated for hypertension with CKD.   In January /75, increased amlodipine to 5mg dose.  Today, has 2.5mg has pill bottle.  Not taking 5mg dose.      Chronic cerebrovascular disease with sella meningioma.  Has been seen by neurology.    On statin for hyperlipidemia. No myalgia.      Treated for glaucoma.    Past Medical History:   Diagnosis Date    GERD (gastroesophageal reflux disease) 2/11/2010    Glaucoma, open angle 10/29/2010    Headache     HTN (hypertension) 2/11/2010    Hypercholesterolemia     Mixed hyperlipidemia 2/11/2010    Type II or unspecified type diabetes mellitus with renal manifestations, not stated as uncontrolled(250.40) 2/11/2010    Type II or unspecified type diabetes mellitus with renal manifestations, not stated as uncontrolled(250.40) 2/11/2010       Family History   Problem Relation Age of Onset    Diabetes Brother     Other Father         accidental death when pt was a little girl    Cancer Mother         lung    Hypertension Mother     Hypertension Brother     Cancer Brother         stomach        Social History     Socioeconomic History    Marital status:      Spouse name: Not on file    Number of children: Not on file    Years of education: Not on file    Highest education level: Not on file   Occupational History    Not on file   Tobacco Use    Smoking status: Never    Smokeless tobacco: Never   Substance and Sexual Activity    Alcohol use: No    Drug use: No    Sexual activity: Never     Birth control/protection: None   Other Topics Concern    Not on file   Social History Narrative    Not on file     Social Drivers of Health     Financial Resource

## 2025-05-14 ENCOUNTER — OFFICE VISIT (OUTPATIENT)
Age: 89
End: 2025-05-14
Payer: MEDICARE

## 2025-05-14 VITALS
RESPIRATION RATE: 20 BRPM | OXYGEN SATURATION: 94 % | HEART RATE: 66 BPM | WEIGHT: 165 LBS | DIASTOLIC BLOOD PRESSURE: 69 MMHG | HEIGHT: 66 IN | SYSTOLIC BLOOD PRESSURE: 160 MMHG | TEMPERATURE: 97.2 F | BODY MASS INDEX: 26.52 KG/M2

## 2025-05-14 DIAGNOSIS — L89.522 PRESSURE INJURY OF LEFT ANKLE, STAGE 2 (HCC): ICD-10-CM

## 2025-05-14 DIAGNOSIS — R54 AGE-RELATED PHYSICAL DEBILITY: ICD-10-CM

## 2025-05-14 DIAGNOSIS — F02.B4 MODERATE ALZHEIMER'S DEMENTIA WITH ANXIETY, UNSPECIFIED TIMING OF DEMENTIA ONSET (HCC): ICD-10-CM

## 2025-05-14 DIAGNOSIS — G30.9 MODERATE ALZHEIMER'S DEMENTIA WITH ANXIETY, UNSPECIFIED TIMING OF DEMENTIA ONSET (HCC): ICD-10-CM

## 2025-05-14 DIAGNOSIS — I10 ESSENTIAL (PRIMARY) HYPERTENSION: Primary | ICD-10-CM

## 2025-05-14 DIAGNOSIS — F41.9 ANXIETY: ICD-10-CM

## 2025-05-14 DIAGNOSIS — Z00.00 MEDICARE ANNUAL WELLNESS VISIT, SUBSEQUENT: ICD-10-CM

## 2025-05-14 DIAGNOSIS — E78.00 PURE HYPERCHOLESTEROLEMIA, UNSPECIFIED: ICD-10-CM

## 2025-05-14 PROCEDURE — 1090F PRES/ABSN URINE INCON ASSESS: CPT | Performed by: FAMILY MEDICINE

## 2025-05-14 PROCEDURE — G0439 PPPS, SUBSEQ VISIT: HCPCS | Performed by: FAMILY MEDICINE

## 2025-05-14 PROCEDURE — 1159F MED LIST DOCD IN RCRD: CPT | Performed by: FAMILY MEDICINE

## 2025-05-14 PROCEDURE — 1123F ACP DISCUSS/DSCN MKR DOCD: CPT | Performed by: FAMILY MEDICINE

## 2025-05-14 PROCEDURE — 1036F TOBACCO NON-USER: CPT | Performed by: FAMILY MEDICINE

## 2025-05-14 PROCEDURE — 99214 OFFICE O/P EST MOD 30 MIN: CPT | Performed by: FAMILY MEDICINE

## 2025-05-14 PROCEDURE — 1160F RVW MEDS BY RX/DR IN RCRD: CPT | Performed by: FAMILY MEDICINE

## 2025-05-14 PROCEDURE — G8419 CALC BMI OUT NRM PARAM NOF/U: HCPCS | Performed by: FAMILY MEDICINE

## 2025-05-14 PROCEDURE — G8427 DOCREV CUR MEDS BY ELIG CLIN: HCPCS | Performed by: FAMILY MEDICINE

## 2025-05-14 RX ORDER — HYDROXYZINE HYDROCHLORIDE 10 MG/1
10-20 TABLET, FILM COATED ORAL 3 TIMES DAILY PRN
Qty: 30 TABLET | Refills: 3 | Status: SHIPPED | OUTPATIENT
Start: 2025-05-14

## 2025-05-14 ASSESSMENT — PATIENT HEALTH QUESTIONNAIRE - PHQ9
SUM OF ALL RESPONSES TO PHQ QUESTIONS 1-9: 9
7. TROUBLE CONCENTRATING ON THINGS, SUCH AS READING THE NEWSPAPER OR WATCHING TELEVISION: NEARLY EVERY DAY
5. POOR APPETITE OR OVEREATING: NOT AT ALL
8. MOVING OR SPEAKING SO SLOWLY THAT OTHER PEOPLE COULD HAVE NOTICED. OR THE OPPOSITE, BEING SO FIGETY OR RESTLESS THAT YOU HAVE BEEN MOVING AROUND A LOT MORE THAN USUAL: SEVERAL DAYS
10. IF YOU CHECKED OFF ANY PROBLEMS, HOW DIFFICULT HAVE THESE PROBLEMS MADE IT FOR YOU TO DO YOUR WORK, TAKE CARE OF THINGS AT HOME, OR GET ALONG WITH OTHER PEOPLE: SOMEWHAT DIFFICULT
SUM OF ALL RESPONSES TO PHQ QUESTIONS 1-9: 9
2. FEELING DOWN, DEPRESSED OR HOPELESS: SEVERAL DAYS
6. FEELING BAD ABOUT YOURSELF - OR THAT YOU ARE A FAILURE OR HAVE LET YOURSELF OR YOUR FAMILY DOWN: NOT AT ALL
9. THOUGHTS THAT YOU WOULD BE BETTER OFF DEAD, OR OF HURTING YOURSELF: NOT AT ALL
SUM OF ALL RESPONSES TO PHQ QUESTIONS 1-9: 9
3. TROUBLE FALLING OR STAYING ASLEEP: SEVERAL DAYS
4. FEELING TIRED OR HAVING LITTLE ENERGY: NOT AT ALL
SUM OF ALL RESPONSES TO PHQ QUESTIONS 1-9: 9
1. LITTLE INTEREST OR PLEASURE IN DOING THINGS: NEARLY EVERY DAY

## 2025-05-14 NOTE — PROGRESS NOTES
Have you been to the ER, urgent care clinic since your last visit?  Hospitalized since your last visit?   No.    Have you seen or consulted any other health care providers outside our system since your last visit?   No.

## 2025-05-14 NOTE — PATIENT INSTRUCTIONS
dentist up to date on any new medications the person is taking.  Encourage a balanced diet that includes whole grains, vegetables, and fruits, and that is low in saturated fat and sodium.  Encourage the person you're caring for not to use tobacco products. They can affect dental and general health.  Many older adults have a fixed income and feel that they can't afford dental care. But most towns and cities have programs in which dentists help older adults by lowering fees. Contact your area's public health offices or  for information about dental care in your area.  Using a toothbrush  Older adults with arthritis sometimes have trouble brushing their teeth because they can't easily hold the toothbrush. Their hands and fingers may be stiff, painful, or weak. If this is the case, you can:  Offer an electric toothbrush.  Enlarge the handle of a non-electric toothbrush by wrapping a sponge, an elastic bandage, or adhesive tape around it.  Push the toothbrush handle through a ball made of rubber or soft foam.  Make the handle longer and thicker by taping Popsicle sticks or tongue depressors to it.  You may also be able to buy special toothbrushes, toothpaste dispensers, and floss holders.  Your doctor may recommend a soft-bristle toothbrush if the person you care for bleeds easily. Bleeding can happen because of a health problem or from certain medicines.  A toothpaste for sensitive teeth may help if the person you care for has sensitive teeth.  How do you brush and floss someone's teeth?  If the person you are caring for has a hard time cleaning their teeth on their own, you may need to brush and floss their teeth for them. It may be easiest to have the person sit and face away from you, and to sit or stand behind them. That way you can steady their head against your arm as you reach around to floss and brush their teeth. Choose a place that has good lighting and is comfortable for both of you.  Before

## 2025-05-21 ENCOUNTER — TELEPHONE (OUTPATIENT)
Age: 89
End: 2025-05-21

## 2025-05-21 NOTE — TELEPHONE ENCOUNTER
Pt was seen last week.  Doctor  saw the spot on foot.    She states the skin is opening.  It's like a scab that is opening from the foot.    Pt needs to be seen this week Zehra states. First available 5-30-25 and that is too far.      Please call to advise or schedule

## 2025-05-21 NOTE — TELEPHONE ENCOUNTER
Please advise. Daughter  stated the spot on the foot looks like its a scab now and its very dark, it looks like it wants to come off of the skin, but there is no open wound and the area is dry-Pt states the area hurts, pt does have cotton socks on but no bandaid or anything else covering it.

## 2025-05-22 ENCOUNTER — TELEPHONE (OUTPATIENT)
Age: 89
End: 2025-05-22

## 2025-05-22 RX ORDER — NYSTATIN 100000 [USP'U]/G
POWDER TOPICAL
Qty: 60 G | Refills: 2 | Status: SHIPPED | OUTPATIENT
Start: 2025-05-22

## 2025-06-17 DIAGNOSIS — E78.00 PURE HYPERCHOLESTEROLEMIA, UNSPECIFIED: ICD-10-CM

## 2025-06-17 RX ORDER — PRAVASTATIN SODIUM 40 MG
40 TABLET ORAL
Qty: 90 TABLET | Refills: 3 | Status: SHIPPED | OUTPATIENT
Start: 2025-06-17

## 2025-07-12 DIAGNOSIS — I10 UNCONTROLLED HYPERTENSION: ICD-10-CM

## 2025-07-12 RX ORDER — AMLODIPINE BESYLATE 2.5 MG/1
TABLET ORAL
Qty: 90 TABLET | Refills: 1 | Status: SHIPPED | OUTPATIENT
Start: 2025-07-12

## 2025-07-17 DIAGNOSIS — I10 HYPERTENSION, MALIGNANT: ICD-10-CM

## 2025-07-17 RX ORDER — AMLODIPINE BESYLATE 5 MG/1
5 TABLET ORAL NIGHTLY
Qty: 90 TABLET | Refills: 1 | Status: SHIPPED | OUTPATIENT
Start: 2025-07-17

## 2025-09-02 RX ORDER — NYSTATIN 100000 [USP'U]/G
POWDER TOPICAL 2 TIMES DAILY
Qty: 60 G | Refills: 2 | Status: SHIPPED | OUTPATIENT
Start: 2025-09-02